# Patient Record
Sex: FEMALE | Race: OTHER | NOT HISPANIC OR LATINO | Employment: FULL TIME | URBAN - METROPOLITAN AREA
[De-identification: names, ages, dates, MRNs, and addresses within clinical notes are randomized per-mention and may not be internally consistent; named-entity substitution may affect disease eponyms.]

---

## 2020-06-04 ENCOUNTER — TELEMEDICINE (OUTPATIENT)
Dept: DERMATOLOGY | Facility: CLINIC | Age: 60
End: 2020-06-04
Payer: COMMERCIAL

## 2020-06-04 DIAGNOSIS — L68.0 HIRSUTISM: Primary | ICD-10-CM

## 2020-06-04 DIAGNOSIS — L02.91 ABSCESS: ICD-10-CM

## 2020-06-04 PROCEDURE — 99243 OFF/OP CNSLTJ NEW/EST LOW 30: CPT | Performed by: DERMATOLOGY

## 2020-06-04 RX ORDER — CEPHALEXIN 500 MG/1
500 CAPSULE ORAL 2 TIMES DAILY
Qty: 14 CAPSULE | Refills: 1 | Status: SHIPPED | OUTPATIENT
Start: 2020-06-04 | End: 2020-06-11

## 2020-06-04 RX ORDER — FINASTERIDE 1 MG/1
1 TABLET, FILM COATED ORAL DAILY
Qty: 90 TABLET | Refills: 2 | Status: SHIPPED | OUTPATIENT
Start: 2020-06-04 | End: 2021-02-19 | Stop reason: SDUPTHER

## 2020-06-04 RX ORDER — LISINOPRIL 10 MG/1
10 TABLET ORAL DAILY
COMMUNITY
End: 2021-01-21

## 2020-06-04 RX ORDER — SPIRONOLACTONE 25 MG/1
25 TABLET ORAL DAILY
Qty: 30 TABLET | Refills: 2 | Status: CANCELLED | OUTPATIENT
Start: 2020-06-04

## 2020-06-04 RX ORDER — MULTIVIT-MIN/IRON FUM/FOLIC AC 7.5 MG-4
1 TABLET ORAL DAILY
COMMUNITY

## 2020-06-12 ENCOUNTER — TELEPHONE (OUTPATIENT)
Dept: DERMATOLOGY | Facility: CLINIC | Age: 60
End: 2020-06-12

## 2020-06-12 NOTE — TELEPHONE ENCOUNTER
Please contact patient in regards of scheduling an appointment follow up with Dr Monika Alvarenga in [facility]  Patient wast last seen virtually on 6/04/2020 and was suppose to return in a week if condition did not get better

## 2020-06-15 ENCOUNTER — OFFICE VISIT (OUTPATIENT)
Dept: DERMATOLOGY | Facility: CLINIC | Age: 60
End: 2020-06-15
Payer: COMMERCIAL

## 2020-06-15 VITALS — WEIGHT: 178.5 LBS | BODY MASS INDEX: 25.56 KG/M2 | HEIGHT: 70 IN | TEMPERATURE: 96.8 F

## 2020-06-15 DIAGNOSIS — L98.0 PYOGENIC GRANULOMA: Primary | ICD-10-CM

## 2020-06-15 PROCEDURE — 11102 TANGNTL BX SKIN SINGLE LES: CPT | Performed by: DERMATOLOGY

## 2020-06-15 PROCEDURE — 88305 TISSUE EXAM BY PATHOLOGIST: CPT | Performed by: STUDENT IN AN ORGANIZED HEALTH CARE EDUCATION/TRAINING PROGRAM

## 2020-06-15 PROCEDURE — 99213 OFFICE O/P EST LOW 20 MIN: CPT | Performed by: DERMATOLOGY

## 2020-06-22 ENCOUNTER — TELEPHONE (OUTPATIENT)
Dept: DERMATOLOGY | Facility: CLINIC | Age: 60
End: 2020-06-22

## 2020-06-22 DIAGNOSIS — L68.0 HIRSUTISM: ICD-10-CM

## 2020-06-24 DIAGNOSIS — L29.9 PRURITUS OF SCALP: Primary | ICD-10-CM

## 2020-06-24 RX ORDER — KETOCONAZOLE 20 MG/ML
1 SHAMPOO TOPICAL 2 TIMES WEEKLY
Qty: 120 ML | Refills: 3 | Status: SHIPPED | OUTPATIENT
Start: 2020-06-25 | End: 2021-01-21

## 2020-11-07 ENCOUNTER — LAB (OUTPATIENT)
Dept: LAB | Age: 60
End: 2020-11-07

## 2020-11-07 ENCOUNTER — TRANSCRIBE ORDERS (OUTPATIENT)
Dept: ADMINISTRATIVE | Age: 60
End: 2020-11-07

## 2020-11-07 DIAGNOSIS — Z02.1 PRE-EMPLOYMENT HEALTH SCREENING EXAMINATION: Primary | ICD-10-CM

## 2020-11-07 DIAGNOSIS — Z02.1 PRE-EMPLOYMENT HEALTH SCREENING EXAMINATION: ICD-10-CM

## 2020-11-07 PROCEDURE — 86765 RUBEOLA ANTIBODY: CPT

## 2020-11-07 PROCEDURE — 86787 VARICELLA-ZOSTER ANTIBODY: CPT

## 2020-11-07 PROCEDURE — 86762 RUBELLA ANTIBODY: CPT

## 2020-11-07 PROCEDURE — 86735 MUMPS ANTIBODY: CPT

## 2020-11-07 PROCEDURE — 86480 TB TEST CELL IMMUN MEASURE: CPT

## 2020-11-08 LAB — RUBV IGG SERPL IA-ACNC: 25.9 IU/ML

## 2020-11-09 LAB
GAMMA INTERFERON BACKGROUND BLD IA-ACNC: 0.03 IU/ML
M TB IFN-G BLD-IMP: NEGATIVE
M TB IFN-G CD4+ BCKGRND COR BLD-ACNC: 0 IU/ML
M TB IFN-G CD4+ BCKGRND COR BLD-ACNC: 0.01 IU/ML
MITOGEN IGNF BCKGRD COR BLD-ACNC: >10 IU/ML

## 2020-11-10 LAB
MEV IGG SER QL: NORMAL
MUV IGG SER QL: NORMAL
VZV IGG SER IA-ACNC: NORMAL

## 2020-12-01 ENCOUNTER — NURSE TRIAGE (OUTPATIENT)
Dept: OTHER | Facility: OTHER | Age: 60
End: 2020-12-01

## 2020-12-01 ENCOUNTER — TELEMEDICINE (OUTPATIENT)
Dept: INTERNAL MEDICINE CLINIC | Facility: CLINIC | Age: 60
End: 2020-12-01
Payer: OTHER GOVERNMENT

## 2020-12-01 VITALS — HEIGHT: 70 IN | WEIGHT: 180 LBS | BODY MASS INDEX: 25.77 KG/M2

## 2020-12-01 DIAGNOSIS — Z12.11 SCREENING FOR MALIGNANT NEOPLASM OF COLON: Primary | ICD-10-CM

## 2020-12-01 DIAGNOSIS — Z20.822 EXPOSURE TO COVID-19 VIRUS: ICD-10-CM

## 2020-12-01 DIAGNOSIS — Z12.31 ENCOUNTER FOR SCREENING MAMMOGRAM FOR MALIGNANT NEOPLASM OF BREAST: ICD-10-CM

## 2020-12-01 DIAGNOSIS — B34.9 VIRAL INFECTION, UNSPECIFIED: ICD-10-CM

## 2020-12-01 PROCEDURE — 99213 OFFICE O/P EST LOW 20 MIN: CPT | Performed by: NURSE PRACTITIONER

## 2020-12-01 PROCEDURE — U0003 INFECTIOUS AGENT DETECTION BY NUCLEIC ACID (DNA OR RNA); SEVERE ACUTE RESPIRATORY SYNDROME CORONAVIRUS 2 (SARS-COV-2) (CORONAVIRUS DISEASE [COVID-19]), AMPLIFIED PROBE TECHNIQUE, MAKING USE OF HIGH THROUGHPUT TECHNOLOGIES AS DESCRIBED BY CMS-2020-01-R: HCPCS | Performed by: NURSE PRACTITIONER

## 2020-12-02 LAB — SARS-COV-2 RNA SPEC QL NAA+PROBE: NOT DETECTED

## 2020-12-30 ENCOUNTER — TELEPHONE (OUTPATIENT)
Dept: GASTROENTEROLOGY | Facility: CLINIC | Age: 60
End: 2020-12-30

## 2021-01-02 ENCOUNTER — IMMUNIZATIONS (OUTPATIENT)
Dept: FAMILY MEDICINE CLINIC | Facility: HOSPITAL | Age: 61
End: 2021-01-02

## 2021-01-02 DIAGNOSIS — Z23 ENCOUNTER FOR IMMUNIZATION: ICD-10-CM

## 2021-01-02 PROCEDURE — 0011A SARS-COV-2 / COVID-19 MRNA VACCINE (MODERNA) 100 MCG: CPT

## 2021-01-02 PROCEDURE — 91301 SARS-COV-2 / COVID-19 MRNA VACCINE (MODERNA) 100 MCG: CPT

## 2021-01-21 ENCOUNTER — OFFICE VISIT (OUTPATIENT)
Dept: INTERNAL MEDICINE CLINIC | Age: 61
End: 2021-01-21
Payer: COMMERCIAL

## 2021-01-21 VITALS
TEMPERATURE: 97.9 F | HEART RATE: 61 BPM | SYSTOLIC BLOOD PRESSURE: 122 MMHG | OXYGEN SATURATION: 97 % | WEIGHT: 191 LBS | DIASTOLIC BLOOD PRESSURE: 70 MMHG | BODY MASS INDEX: 29.98 KG/M2 | HEIGHT: 67 IN

## 2021-01-21 DIAGNOSIS — R73.01 IMPAIRED FASTING GLUCOSE: ICD-10-CM

## 2021-01-21 DIAGNOSIS — F51.01 PRIMARY INSOMNIA: ICD-10-CM

## 2021-01-21 DIAGNOSIS — Z12.11 SCREENING FOR MALIGNANT NEOPLASM OF COLON: ICD-10-CM

## 2021-01-21 DIAGNOSIS — Z11.59 NEED FOR HEPATITIS C SCREENING TEST: ICD-10-CM

## 2021-01-21 DIAGNOSIS — I10 BENIGN ESSENTIAL HYPERTENSION: Primary | ICD-10-CM

## 2021-01-21 DIAGNOSIS — Z12.39 ENCOUNTER FOR SCREENING FOR MALIGNANT NEOPLASM OF BREAST, UNSPECIFIED SCREENING MODALITY: ICD-10-CM

## 2021-01-21 DIAGNOSIS — L29.9 PRURITUS OF SCALP: ICD-10-CM

## 2021-01-21 DIAGNOSIS — F42.2 MIXED OBSESSIONAL THOUGHTS AND ACTS: ICD-10-CM

## 2021-01-21 DIAGNOSIS — Z13.228 SCREENING FOR METABOLIC DISORDER: ICD-10-CM

## 2021-01-21 PROCEDURE — 99214 OFFICE O/P EST MOD 30 MIN: CPT | Performed by: PHYSICIAN ASSISTANT

## 2021-01-21 RX ORDER — TRAZODONE HYDROCHLORIDE 50 MG/1
50 TABLET ORAL
COMMUNITY
End: 2021-01-21 | Stop reason: SDUPTHER

## 2021-01-21 RX ORDER — TRAZODONE HYDROCHLORIDE 50 MG/1
50 TABLET ORAL
Qty: 30 TABLET | Refills: 0 | Status: SHIPPED | OUTPATIENT
Start: 2021-01-21 | End: 2021-02-08 | Stop reason: SDUPTHER

## 2021-01-21 RX ORDER — KETOCONAZOLE 20 MG/ML
1 SHAMPOO TOPICAL 2 TIMES WEEKLY
Qty: 120 ML | Refills: 3 | Status: SHIPPED | OUTPATIENT
Start: 2021-01-21 | End: 2021-06-02 | Stop reason: SDUPTHER

## 2021-01-21 NOTE — PROGRESS NOTES
Assessment/Plan:         Diagnoses and all orders for this visit:    Benign essential hypertension  Comments:  pt stopped her lisinopril approx 2 months ago  Orders:  -     CBC and differential; Future  -     Comprehensive metabolic panel; Future  -     Lipid panel; Future  -     HEMOGLOBIN A1C W/ EAG ESTIMATION; Future    Need for hepatitis C screening test  -     Hepatitis C antibody; Future    Encounter for screening for malignant neoplasm of breast, unspecified screening modality  -     Mammo screening bilateral w 3d & cad; Future    Screening for malignant neoplasm of colon  -     Cologuard; Future    Mixed obsessional thoughts and acts  Comments:  with cleaning mostly, pt has difficulty with falling asleep at night     Primary insomnia  -     traZODone (DESYREL) 50 mg tablet; Take 1 tablet (50 mg total) by mouth daily at bedtime  -     CBC and differential; Future  -     Comprehensive metabolic panel; Future  -     Lipid panel; Future  -     HEMOGLOBIN A1C W/ EAG ESTIMATION; Future    Impaired fasting glucose  Comments:  mild elevation hga1c 5 8 (was 5 6)   +family history of dm (father)  Orders:  -     CBC and differential; Future  -     Comprehensive metabolic panel; Future  -     Lipid panel; Future  -     HEMOGLOBIN A1C W/ EAG ESTIMATION; Future    Screening for metabolic disorder  -     Lipid panel; Future    Pruritus of scalp  Comments:  may use ketoconazole prn   Orders:  -     ketoconazole (NIZORAL) 2 % shampoo; Apply 1 application topically 2 (two) times a week    Other orders  -     Discontinue: traZODone (DESYREL) 50 mg tablet; Take 50 mg by mouth daily at bedtime        BMI Counseling: Body mass index is 29 54 kg/m²  The BMI is above normal  Nutrition recommendations include encouraging healthy choices of fruits and vegetables, limiting drinks that contain sugar, moderation in carbohydrate intake and increasing intake of lean protein   Exercise recommendations include exercising 3-5 times per week            Subjective:      Patient ID: Cassius Delgado is a 64 y o  female  63 y/o female presents to establish care, hx of htn, impaired FBS  Previous pcp Dr Jimy Brand - [facility]  Pt is new employee [facility]  Pt reports she was on lisinopril in past but stopped taking this on her own approx 2 months ago  bp well controlled in office today    Pt reports hx of insomnia, related to anxiety and grief associated with loss of her son 2 years ago  She has been taking trazodone qhs for this with good results    Hx of impaired fbs   fam hx of dm  Pt continues to keep active, walks daily      The following portions of the patient's history were reviewed and updated as appropriate: allergies, current medications, past family history, past medical history, past social history, past surgical history and problem list     Review of Systems   Constitutional: Negative for appetite change, chills, fatigue and fever  HENT: Negative for congestion, sinus pain and sore throat  Eyes: Negative for photophobia and redness  Respiratory: Negative for cough, shortness of breath and wheezing  Cardiovascular: Negative for chest pain and leg swelling  Gastrointestinal: Negative for abdominal pain, constipation, diarrhea and nausea  Genitourinary: Negative for dysuria and frequency  Musculoskeletal: Negative for arthralgias and back pain  Skin: Negative for rash  Neurological: Negative for dizziness and headaches  Psychiatric/Behavioral: Negative for sleep disturbance  The patient is not nervous/anxious            Past Medical History:   Diagnosis Date    Hypertension          Current Outpatient Medications:     Multiple Vitamins-Minerals (MULTIVITAMIN WITH MINERALS) tablet, Take 1 tablet by mouth daily, Disp: , Rfl:     finasteride (PROPECIA) 1 MG tablet, Take 1 tablet (1 mg total) by mouth daily (Patient not taking: Reported on 1/21/2021), Disp: 90 tablet, Rfl: 2    ketoconazole (NIZORAL) 2 % shampoo, Apply 1 application topically 2 (two) times a week, Disp: 120 mL, Rfl: 3    traZODone (DESYREL) 50 mg tablet, Take 1 tablet (50 mg total) by mouth daily at bedtime, Disp: 30 tablet, Rfl: 0    Allergies   Allergen Reactions    Other Itching and Rash     Dust, bugs        Social History   Past Surgical History:   Procedure Laterality Date    TUBAL LIGATION       Family History   Problem Relation Age of Onset    No Known Problems Mother     Diabetes Father     Glaucoma Father        Objective:  /70 (BP Location: Left arm, Patient Position: Sitting, Cuff Size: Standard)   Pulse 61   Temp 97 9 °F (36 6 °C) (Temporal)   Ht 5' 7 42" (1 712 m) Comment: shoes off  Wt 86 6 kg (191 lb) Comment: shoes off  SpO2 97%   BMI 29 54 kg/m²        Physical Exam  Vitals signs reviewed  Constitutional:       General: She is not in acute distress  Eyes:      Extraocular Movements: Extraocular movements intact  Conjunctiva/sclera: Conjunctivae normal       Pupils: Pupils are equal, round, and reactive to light  Neck:      Musculoskeletal: Normal range of motion  Cardiovascular:      Rate and Rhythm: Normal rate and regular rhythm  Abdominal:      General: Bowel sounds are normal  There is no distension  Musculoskeletal: Normal range of motion  Right lower leg: No edema  Left lower leg: No edema  Lymphadenopathy:      Cervical: No cervical adenopathy  Skin:     Findings: No rash  Neurological:      General: No focal deficit present  Mental Status: She is alert and oriented to person, place, and time     Psychiatric:         Mood and Affect: Mood normal          Behavior: Behavior normal

## 2021-01-22 ENCOUNTER — TELEPHONE (OUTPATIENT)
Dept: ADMINISTRATIVE | Facility: OTHER | Age: 61
End: 2021-01-22

## 2021-01-22 NOTE — TELEPHONE ENCOUNTER
----- Message from Luiz Sutton MA sent at 1/22/2021 12:29 PM EST -----  Regarding: Pap  01/22/21 12:29 PM    Hello, our patient Sree Aguila has had Pap Smear (HPV) aka Cervical Cancer Screening completed/performed  Please assist in updating the patient chart by pulling the Care Everywhere (CE) document  The date of service is 08/01/2017       Thank you,  Luiz Sutton MA  [facility]

## 2021-01-22 NOTE — TELEPHONE ENCOUNTER
Upon review of the In Basket request we were able to locate, review, and update the patient chart as requested for Pap Smear (HPV) aka Cervical Cancer Screening  Any additional questions or concerns should be emailed to the Practice Liaisons via [email or web address] email, please do not reply via In Basket      Thank you  Andrew Ding MA

## 2021-01-25 ENCOUNTER — TELEMEDICINE (OUTPATIENT)
Dept: INTERNAL MEDICINE CLINIC | Facility: CLINIC | Age: 61
End: 2021-01-25
Payer: COMMERCIAL

## 2021-01-25 ENCOUNTER — TELEPHONE (OUTPATIENT)
Dept: INTERNAL MEDICINE CLINIC | Facility: CLINIC | Age: 61
End: 2021-01-25

## 2021-01-25 DIAGNOSIS — K52.9 GASTROENTERITIS: ICD-10-CM

## 2021-01-25 DIAGNOSIS — K52.9 GASTROENTERITIS: Primary | ICD-10-CM

## 2021-01-25 PROCEDURE — U0005 INFEC AGEN DETEC AMPLI PROBE: HCPCS | Performed by: INTERNAL MEDICINE

## 2021-01-25 PROCEDURE — 99213 OFFICE O/P EST LOW 20 MIN: CPT | Performed by: INTERNAL MEDICINE

## 2021-01-25 PROCEDURE — U0003 INFECTIOUS AGENT DETECTION BY NUCLEIC ACID (DNA OR RNA); SEVERE ACUTE RESPIRATORY SYNDROME CORONAVIRUS 2 (SARS-COV-2) (CORONAVIRUS DISEASE [COVID-19]), AMPLIFIED PROBE TECHNIQUE, MAKING USE OF HIGH THROUGHPUT TECHNOLOGIES AS DESCRIBED BY CMS-2020-01-R: HCPCS | Performed by: INTERNAL MEDICINE

## 2021-01-25 NOTE — TELEPHONE ENCOUNTER
Patient works for [employer] needs as covid script put in her chart  Alisa Bland will not her back to work until she gets tested

## 2021-01-25 NOTE — LETTER
[address] [place] [address]  [place] [id number]  Phone#  [phone]  Fax#  [phone]      January 25, 2021     Patient: Nikki Carvajal  YOB: 1960  Date of Last Encounter: 1/25/2021    To whom it may concern:    Nikki Carvajal is being sent for COVID testing  She may work remotely until her [place] test has returned as she is to remain in quarantine at this time       Sincerely,      Jasen Dickinson MD

## 2021-01-25 NOTE — PROGRESS NOTES
Virtual Regular Visit      Assessment/Plan:    Problem List Items Addressed This Visit        Digestive    Gastroenteritis - Primary     Discussed adhering to a clear liquid diet and advancing as tolerated  Emphasized the importance of adequate oral hydration  Her employer is requesting she be tested for [place] prior to returning to work  Will send for COVID testing  In the meantime, advised that she remaining quarantine until test has been resulted  Relevant Orders    Novel Coronavirus (Covid-19),PCR [facility] or [facility]               Reason for visit is   Chief Complaint   Patient presents with    Diarrhea     Patient is here c/o vomiting, diarrhea and vomiting  Sx are going on since last night  Pt was unable to take vitals from home   Virtual Regular Visit        Encounter provider Jasen Dickinson MD    Provider located at [address] [place] [id number]      Recent Visits  No visits were found meeting these conditions  Showing recent visits within past 7 days and meeting all other requirements     Today's Visits  Date Type Provider Dept   01/25/21 Telemedicine Jasen Dickinson MD [facility]   01/25/21 Telephone Darius Dudley DO [facility]   Showing today's visits and meeting all other requirements     Future Appointments  No visits were found meeting these conditions  Showing future appointments within next 150 days and meeting all other requirements        The patient was identified by name and date of birth  Nikki Carvajal was informed that this is a telemedicine visit and that the visit is being conducted through [facility] and patient was informed that this is a secure, HIPAA-compliant platform  She agrees to proceed     My office door was closed  No one else was in the room    She acknowledged consent and understanding of privacy and security of the video platform  The patient has agreed to participate and understands they can discontinue the visit at any time  Patient is aware this is a billable service  Marycarmen Gilmore is a  64year old female is seen today with symptoms of gastroenteritis  She had seafood for dinner last night and subsequently developed nausea, nonbloody/nonbilious emesis, and diarrhea  She has not had any food to eat today  Diarrhea   This is a new problem  The current episode started yesterday  The problem has been unchanged  The stool consistency is described as watery  Associated symptoms include vomiting  Pertinent negatives include no abdominal pain, chills, coughing, fever or headaches  Risk factors include suspect food intake  She has tried increased fluids for the symptoms  The treatment provided no relief  Past Medical History:   Diagnosis Date    Hypertension        Past Surgical History:   Procedure Laterality Date    TUBAL LIGATION         Current Outpatient Medications   Medication Sig Dispense Refill    ketoconazole (NIZORAL) 2 % shampoo Apply 1 application topically 2 (two) times a week 120 mL 3    Multiple Vitamins-Minerals (MULTIVITAMIN WITH MINERALS) tablet Take 1 tablet by mouth daily      traZODone (DESYREL) 50 mg tablet Take 1 tablet (50 mg total) by mouth daily at bedtime 30 tablet 0    finasteride (PROPECIA) 1 MG tablet Take 1 tablet (1 mg total) by mouth daily (Patient not taking: Reported on 1/21/2021) 90 tablet 2     No current facility-administered medications for this visit  Allergies   Allergen Reactions    Other Itching and Rash     Dust, bugs        Review of Systems   Constitutional: Negative for activity change, appetite change, chills, diaphoresis, fatigue and fever  HENT: Negative for congestion, postnasal drip, rhinorrhea, sinus pressure, sinus pain, sneezing and sore throat      Eyes: Negative for visual disturbance  Respiratory: Negative for apnea, cough, choking, chest tightness, shortness of breath and wheezing  Cardiovascular: Negative for chest pain, palpitations and leg swelling  Gastrointestinal: Positive for diarrhea and vomiting  Negative for abdominal distention, abdominal pain, anal bleeding, blood in stool, constipation and nausea  Endocrine: Negative for cold intolerance and heat intolerance  Genitourinary: Negative for difficulty urinating, dysuria and hematuria  Musculoskeletal: Negative  Skin: Negative  Neurological: Negative for dizziness, weakness, light-headedness, numbness and headaches  Hematological: Negative for adenopathy  Psychiatric/Behavioral: Negative for agitation, sleep disturbance and suicidal ideas  All other systems reviewed and are negative  Video Exam    There were no vitals filed for this visit  Physical Exam  Vitals signs and nursing note reviewed  Constitutional:       General: She is not in acute distress  Appearance: She is well-developed  She is not diaphoretic  HENT:      Head: Normocephalic and atraumatic  Eyes:      General:         Right eye: No discharge  Left eye: No discharge  Conjunctiva/sclera: Conjunctivae normal    Neck:      Musculoskeletal: Normal range of motion  Pulmonary:      Effort: Pulmonary effort is normal  No respiratory distress  Abdominal:      General: There is no distension  Tenderness: There is no abdominal tenderness  Musculoskeletal:         General: No tenderness or deformity  Skin:     Coloration: Skin is not pale  Findings: No erythema or rash  Neurological:      Mental Status: She is alert and oriented to person, place, and time  Cranial Nerves: No cranial nerve deficit  Psychiatric:         Behavior: Behavior normal          Thought Content:  Thought content normal          Judgment: Judgment normal           I spent 15 minutes directly with the patient during this visit      VIRTUAL VISIT DISCLAIMER    Ibis Dillard acknowledges that she has consented to an online visit or consultation  She understands that the online visit is based solely on information provided by her, and that, in the absence of a face-to-face physical evaluation by the physician, the diagnosis she receives is both limited and provisional in terms of accuracy and completeness  This is not intended to replace a full medical face-to-face evaluation by the physician  Roseann Whaley understands and accepts these terms

## 2021-01-25 NOTE — ASSESSMENT & PLAN NOTE
Discussed adhering to a clear liquid diet and advancing as tolerated  Emphasized the importance of adequate oral hydration  Her employer is requesting she be tested for [place] prior to returning to work  Will send for COVID testing  In the meantime, advised that she remaining quarantine until test has been resulted

## 2021-01-26 ENCOUNTER — TELEPHONE (OUTPATIENT)
Dept: INTERNAL MEDICINE CLINIC | Age: 61
End: 2021-01-26

## 2021-01-26 LAB — SARS-COV-2 RNA RESP QL NAA+PROBE: NEGATIVE

## 2021-01-26 NOTE — TELEPHONE ENCOUNTER
----- Message from Jorge Chasenicheko Mariscal sent at 1/26/2021  4:21 PM EST -----  Please call Ibis and let her know that her COVID-19 swab was negative  Please advise her to continue social distancing procedures

## 2021-01-26 NOTE — RESULT ENCOUNTER NOTE
Please call Ibis and let her know that her COVID-19 swab was negative  Please advise her to continue social distancing procedures

## 2021-01-30 ENCOUNTER — IMMUNIZATIONS (OUTPATIENT)
Dept: FAMILY MEDICINE CLINIC | Facility: HOSPITAL | Age: 61
End: 2021-01-30

## 2021-01-30 DIAGNOSIS — Z23 ENCOUNTER FOR IMMUNIZATION: Primary | ICD-10-CM

## 2021-01-30 PROCEDURE — 0012A SARS-COV-2 / COVID-19 MRNA VACCINE (MODERNA) 100 MCG: CPT

## 2021-01-30 PROCEDURE — 91301 SARS-COV-2 / COVID-19 MRNA VACCINE (MODERNA) 100 MCG: CPT

## 2021-02-08 DIAGNOSIS — F51.01 PRIMARY INSOMNIA: ICD-10-CM

## 2021-02-08 RX ORDER — TRAZODONE HYDROCHLORIDE 50 MG/1
50 TABLET ORAL
Qty: 30 TABLET | Refills: 0 | Status: SHIPPED | OUTPATIENT
Start: 2021-02-08 | End: 2021-03-02 | Stop reason: SDUPTHER

## 2021-02-08 NOTE — TELEPHONE ENCOUNTER
the patient reports has been taking 1-2 tabs per night, reports that how she was taking it in the past     Has no medication left

## 2021-02-08 NOTE — TELEPHONE ENCOUNTER
Patient called to Refill her Prescription for Trazodone 50mg  Please f/u with Patient for her Medication   Thank You

## 2021-02-19 ENCOUNTER — TELEPHONE (OUTPATIENT)
Dept: DERMATOLOGY | Facility: CLINIC | Age: 61
End: 2021-02-19

## 2021-02-19 DIAGNOSIS — L68.0 HIRSUTISM: ICD-10-CM

## 2021-02-19 RX ORDER — FINASTERIDE 1 MG/1
1 TABLET, FILM COATED ORAL DAILY
Qty: 90 TABLET | Refills: 2 | Status: SHIPPED | OUTPATIENT
Start: 2021-02-19 | End: 2021-06-02

## 2021-02-28 DIAGNOSIS — F51.01 PRIMARY INSOMNIA: ICD-10-CM

## 2021-03-01 DIAGNOSIS — F51.01 PRIMARY INSOMNIA: ICD-10-CM

## 2021-03-01 RX ORDER — TRAZODONE HYDROCHLORIDE 50 MG/1
TABLET ORAL
Qty: 30 TABLET | Refills: 0 | OUTPATIENT
Start: 2021-03-01

## 2021-03-02 ENCOUNTER — TELEPHONE (OUTPATIENT)
Dept: INTERNAL MEDICINE CLINIC | Age: 61
End: 2021-03-02

## 2021-03-02 DIAGNOSIS — L68.0 HIRSUTISM: Primary | ICD-10-CM

## 2021-03-02 DIAGNOSIS — F51.01 PRIMARY INSOMNIA: ICD-10-CM

## 2021-03-02 RX ORDER — TRAZODONE HYDROCHLORIDE 50 MG/1
50 TABLET ORAL
Qty: 30 TABLET | Refills: 0 | Status: SHIPPED | OUTPATIENT
Start: 2021-03-02 | End: 2021-03-24 | Stop reason: SDUPTHER

## 2021-03-02 RX ORDER — FINASTERIDE 5 MG/1
TABLET, FILM COATED ORAL
Qty: 10 TABLET | Refills: 5 | Status: SHIPPED | OUTPATIENT
Start: 2021-03-02 | End: 2021-06-02

## 2021-03-03 RX ORDER — TRAZODONE HYDROCHLORIDE 50 MG/1
TABLET ORAL
Qty: 30 TABLET | Refills: 0 | OUTPATIENT
Start: 2021-03-03

## 2021-03-22 ENCOUNTER — TELEPHONE (OUTPATIENT)
Dept: INTERNAL MEDICINE CLINIC | Age: 61
End: 2021-03-22

## 2021-03-22 DIAGNOSIS — F51.01 PRIMARY INSOMNIA: ICD-10-CM

## 2021-03-22 RX ORDER — TRAZODONE HYDROCHLORIDE 50 MG/1
TABLET ORAL
Qty: 30 TABLET | Refills: 0 | OUTPATIENT
Start: 2021-03-22

## 2021-03-22 NOTE — TELEPHONE ENCOUNTER
This patient called the office and needs a refill of her Trazodone  The patient states that she discussed with Jenelle Healy that she takes 2 pills at bedtime because 1 pill does not work  Can we please look into this and send over a refill for this patient  Please call the patient and let her know what the outcome is

## 2021-03-22 NOTE — TELEPHONE ENCOUNTER
Most recent office visit with Bharti doesn't note that she has been taking 2 tabs at bed time   Will need to follow up with Bharti regarding this request

## 2021-03-23 ENCOUNTER — TELEPHONE (OUTPATIENT)
Dept: OTHER | Facility: OTHER | Age: 61
End: 2021-03-23

## 2021-03-23 DIAGNOSIS — F51.01 PRIMARY INSOMNIA: ICD-10-CM

## 2021-03-24 DIAGNOSIS — F51.01 PRIMARY INSOMNIA: ICD-10-CM

## 2021-03-24 RX ORDER — TRAZODONE HYDROCHLORIDE 50 MG/1
50 TABLET ORAL
Qty: 30 TABLET | Refills: 1 | Status: SHIPPED | OUTPATIENT
Start: 2021-03-24 | End: 2021-05-21 | Stop reason: SDUPTHER

## 2021-03-24 RX ORDER — TRAZODONE HYDROCHLORIDE 50 MG/1
50 TABLET ORAL
Qty: 30 TABLET | Refills: 5 | Status: CANCELLED | OUTPATIENT
Start: 2021-03-24

## 2021-03-24 NOTE — TELEPHONE ENCOUNTER
I don't see anything about change in dose in note from January, I see she requested refill yesterday, will send in now

## 2021-04-02 ENCOUNTER — TELEPHONE (OUTPATIENT)
Dept: INTERNAL MEDICINE CLINIC | Facility: CLINIC | Age: 61
End: 2021-04-02

## 2021-04-21 DIAGNOSIS — F51.01 PRIMARY INSOMNIA: ICD-10-CM

## 2021-04-21 RX ORDER — TRAZODONE HYDROCHLORIDE 50 MG/1
50 TABLET ORAL
Qty: 30 TABLET | Refills: 1 | OUTPATIENT
Start: 2021-04-21

## 2021-04-21 NOTE — TELEPHONE ENCOUNTER
Patient called the office for a refill of her Trazodone 50mg and the pharmacy is [facility] on [address]

## 2021-05-12 DIAGNOSIS — F51.01 PRIMARY INSOMNIA: ICD-10-CM

## 2021-05-12 RX ORDER — TRAZODONE HYDROCHLORIDE 50 MG/1
TABLET ORAL
Qty: 30 TABLET | Refills: 1 | OUTPATIENT
Start: 2021-05-12

## 2021-05-21 DIAGNOSIS — F51.01 PRIMARY INSOMNIA: ICD-10-CM

## 2021-05-21 RX ORDER — TRAZODONE HYDROCHLORIDE 50 MG/1
50 TABLET ORAL
Qty: 30 TABLET | Refills: 1 | Status: SHIPPED | OUTPATIENT
Start: 2021-05-21 | End: 2021-07-13 | Stop reason: SDUPTHER

## 2021-06-02 ENCOUNTER — OFFICE VISIT (OUTPATIENT)
Dept: INTERNAL MEDICINE CLINIC | Age: 61
End: 2021-06-02
Payer: COMMERCIAL

## 2021-06-02 VITALS
SYSTOLIC BLOOD PRESSURE: 132 MMHG | HEART RATE: 77 BPM | HEIGHT: 67 IN | BODY MASS INDEX: 29.66 KG/M2 | WEIGHT: 189 LBS | TEMPERATURE: 97.1 F | OXYGEN SATURATION: 97 % | DIASTOLIC BLOOD PRESSURE: 68 MMHG

## 2021-06-02 DIAGNOSIS — L55.1 SUNBURN, SECOND DEGREE: Primary | ICD-10-CM

## 2021-06-02 DIAGNOSIS — L29.9 PRURITUS OF SCALP: ICD-10-CM

## 2021-06-02 DIAGNOSIS — L30.9 DERMATITIS: ICD-10-CM

## 2021-06-02 PROCEDURE — 99213 OFFICE O/P EST LOW 20 MIN: CPT | Performed by: PHYSICIAN ASSISTANT

## 2021-06-02 RX ORDER — KETOCONAZOLE 20 MG/ML
1 SHAMPOO TOPICAL 2 TIMES WEEKLY
Qty: 120 ML | Refills: 3 | Status: SHIPPED | OUTPATIENT
Start: 2021-06-03 | End: 2021-10-21 | Stop reason: SDUPTHER

## 2021-06-02 RX ORDER — PREDNISONE 10 MG/1
TABLET ORAL
Qty: 20 TABLET | Refills: 0 | Status: SHIPPED | OUTPATIENT
Start: 2021-06-02 | End: 2021-08-26

## 2021-06-02 NOTE — PROGRESS NOTES
Assessment/Plan:         Diagnoses and all orders for this visit:    Sunburn, second degree  Comments:  keep skin moisturized   may use aloe  keep hydrated  pred taper in am with food  benadyl at bedtime x 3-5 days  Orders:  -     predniSONE 10 mg tablet; 4 tabs daily for 2 days then 3 tabs daily x 2 days then 2 tabs daily x 2 days then 1 tablet daily for 2 days  Dermatitis  Comments:  f/u in 1-2 weeks if any further bites or rash persist   Orders:  -     predniSONE 10 mg tablet; 4 tabs daily for 2 days then 3 tabs daily x 2 days then 2 tabs daily x 2 days then 1 tablet daily for 2 days  Pruritus of scalp  Comments:  may use ketoconazole prn   Orders:  -     ketoconazole (NIZORAL) 2 % shampoo; Apply 1 application topically 2 (two) times a week      discussed need to wear sunscreen on beach and when sunbathing to prevent blistering of skin and sunburn   -discussed prednisone, may cause trouble sleeping tonight  Pt also to take in am /lunchtime daily and with food     Subjective:      Patient ID: Grant May is a 64 y o  female  63 y/o female who was on vacation in [place] over the weekend, she states she was laying on the beach and fell asleep under a palm tree and woke up with red skin with "bumps"  She did not apply any sun tan lotion  Pt reports her skin has been swollen, hot and pain/itching since the weekend  She notes raised bumps on her LLE with excoriation noted  Pt also is concerned with some type of insect bite  She has tried aloe, tylenol         The following portions of the patient's history were reviewed and updated as appropriate: allergies, current medications, past family history, past medical history, past social history, past surgical history and problem list     Review of Systems   Constitutional: Negative for appetite change, chills, diaphoresis, fatigue and fever  HENT: Negative for congestion and sore throat  Eyes: Negative for pain and redness     Respiratory: Negative for cough, shortness of breath and wheezing  Cardiovascular: Positive for leg swelling  Negative for chest pain  Gastrointestinal: Negative for abdominal pain, diarrhea and nausea  Genitourinary: Negative for dysuria and flank pain  Musculoskeletal: Negative for arthralgias, back pain and gait problem  Skin: Positive for rash  Neurological: Negative for dizziness, light-headedness and headaches  Psychiatric/Behavioral: Negative for sleep disturbance  The patient is not nervous/anxious  Past Medical History:   Diagnosis Date    Hypertension          Current Outpatient Medications:     [START ON 6/3/2021] ketoconazole (NIZORAL) 2 % shampoo, Apply 1 application topically 2 (two) times a week, Disp: 120 mL, Rfl: 3    Multiple Vitamins-Minerals (MULTIVITAMIN WITH MINERALS) tablet, Take 1 tablet by mouth daily, Disp: , Rfl:     traZODone (DESYREL) 50 mg tablet, Take 1 tablet (50 mg total) by mouth daily at bedtime, Disp: 30 tablet, Rfl: 1    predniSONE 10 mg tablet, 4 tabs daily for 2 days then 3 tabs daily x 2 days then 2 tabs daily x 2 days then 1 tablet daily for 2 days  , Disp: 20 tablet, Rfl: 0    Allergies   Allergen Reactions    Other Itching and Rash     Dust, bugs        Social History   Past Surgical History:   Procedure Laterality Date    TUBAL LIGATION       Family History   Problem Relation Age of Onset    No Known Problems Mother     Diabetes Father     Glaucoma Father        Objective:  /68 (BP Location: Left arm, Patient Position: Sitting, Cuff Size: Standard)   Pulse 77   Temp (!) 97 1 °F (36 2 °C) (Temporal)   Ht 5' 7 4" (1 712 m)   Wt 85 7 kg (189 lb)   SpO2 97%   BMI 29 25 kg/m²        Physical Exam  Vitals signs reviewed  Constitutional:       General: She is not in acute distress  HENT:      Head: Normocephalic and atraumatic        Right Ear: Tympanic membrane and ear canal normal       Left Ear: Tympanic membrane and ear canal normal    Eyes:      General: Right eye: No discharge  Left eye: No discharge  Extraocular Movements: Extraocular movements intact  Conjunctiva/sclera: Conjunctivae normal       Pupils: Pupils are equal, round, and reactive to light  Cardiovascular:      Rate and Rhythm: Normal rate and regular rhythm  Pulmonary:      Effort: Pulmonary effort is normal       Breath sounds: No wheezing or rales  Musculoskeletal: Normal range of motion  Right lower leg: Edema present  Left lower leg: Edema (mild LE edema around area of burn ) present  Skin:     General: Skin is warm  Findings: Erythema (diffuse erythematous skin ) and rash present  Comments: Diffuse erythematous skin with mild edema over shins b/l  Small excoriations on LE b/l - raised blisters on L knee   No drainage or exudates    Peeling skin on chest and face    Neurological:      General: No focal deficit present  Mental Status: She is alert and oriented to person, place, and time     Psychiatric:         Mood and Affect: Mood normal          Behavior: Behavior normal

## 2021-06-05 ENCOUNTER — HOSPITAL ENCOUNTER (OUTPATIENT)
Dept: RADIOLOGY | Age: 61
Discharge: HOME | End: 2021-06-05
Payer: COMMERCIAL

## 2021-06-05 VITALS — BODY MASS INDEX: 27.99 KG/M2 | WEIGHT: 189 LBS | HEIGHT: 69 IN

## 2021-06-05 DIAGNOSIS — Z12.39 ENCOUNTER FOR SCREENING FOR MALIGNANT NEOPLASM OF BREAST, UNSPECIFIED SCREENING MODALITY: ICD-10-CM

## 2021-06-05 DIAGNOSIS — Z12.31 ENCOUNTER FOR SCREENING MAMMOGRAM FOR MALIGNANT NEOPLASM OF BREAST: ICD-10-CM

## 2021-06-05 PROCEDURE — 77067 SCR MAMMO BI INCL CAD: CPT

## 2021-06-05 PROCEDURE — 77063 BREAST TOMOSYNTHESIS BI: CPT

## 2021-06-07 ENCOUNTER — TELEMEDICINE (OUTPATIENT)
Dept: INTERNAL MEDICINE CLINIC | Facility: CLINIC | Age: 61
End: 2021-06-07
Payer: COMMERCIAL

## 2021-06-07 VITALS — BODY MASS INDEX: 27.99 KG/M2 | WEIGHT: 189 LBS | HEIGHT: 69 IN

## 2021-06-07 DIAGNOSIS — L55.1 SUNBURN OF SECOND DEGREE: Primary | ICD-10-CM

## 2021-06-07 DIAGNOSIS — R19.7 DIARRHEA, UNSPECIFIED TYPE: ICD-10-CM

## 2021-06-07 PROCEDURE — 99213 OFFICE O/P EST LOW 20 MIN: CPT | Performed by: PHYSICIAN ASSISTANT

## 2021-06-07 NOTE — LETTER
June 7, 2021     Patient: Rubi Lopez   YOB: 1960   Date of Visit: 6/7/2021       To Whom it May Concern:    Rubi Lopez is under my professional care  She was seen in my office on 6/7/2021  She may return to work on 6/9/21  If you have any questions or concerns, please don't hesitate to call           Sincerely,          Yanick Johns PA-C        CC: Rubi Lopez

## 2021-06-07 NOTE — PROGRESS NOTES
Virtual Regular Visit      Assessment/Plan:    Problem List Items Addressed This Visit     None      Visit Diagnoses     Sunburn of second degree    -  Primary    stop pred  keep drinking water and hydrating  use topical cocoa butter and may use milk bath     Diarrhea, unspecified type        d/c pred  brat diet, gatorade to replace fluids               Reason for visit is   Chief Complaint   Patient presents with    Virtual Regular Visit     [phone] Patient started with Diarrhea and frequency, from Prednisone that she was prescriped last week for sunburn    Virtual Regular Visit        Encounter provider Kendrick Collins PA-C    Provider located at [address] [place] [address] [address] [facility] [id number]      Recent Visits  Date Type Provider Dept   06/02/21 Office Visit Kendrick Collins,  [address] recent visits within past 7 days and meeting all other requirements     Today's Visits  Date Type Provider Dept   06/07/21 Telemedicine Kendrick Collins PA-C [facility]   Showing today's visits and meeting all other requirements     Future Appointments  No visits were found meeting these conditions  Showing future appointments within next 150 days and meeting all other requirements        The patient was identified by name and date of birth  Patricia Woodward was informed that this is a telemedicine visit and that the visit is being conducted through [address] [place] [address] Now and patient was informed that this is a secure, HIPAA-compliant platform  She agrees to proceed     My office door was closed  No one else was in the room  She acknowledged consent and understanding of privacy and security of the video platform  The patient has agreed to participate and understands they can discontinue the visit at any time  Patient is aware this is a billable service  Subjective  [facility] [place], [place] is a 64 y o  female   Pt f/u today for 2nd degree sunburn, pt was started on prednisone last week along with topical tx and now c/o diarrhea from prednisone and intense itching from sunburn, she was up all night and was unable to go to work today  She reports her skin is peeling and is intensely itchy  Pt notes the redness is resolving and blisters have resolved  Pt reports appetite is normal and she is hydrating today           Past Medical History:   Diagnosis Date    Hypertension        Past Surgical History:   Procedure Laterality Date    ABLATION COLPOCLESIS      TUBAL LIGATION         Current Outpatient Medications   Medication Sig Dispense Refill    ketoconazole (NIZORAL) 2 % shampoo Apply 1 application topically 2 (two) times a week 120 mL 3    Multiple Vitamins-Minerals (MULTIVITAMIN WITH MINERALS) tablet Take 1 tablet by mouth daily      predniSONE 10 mg tablet 4 tabs daily for 2 days then 3 tabs daily x 2 days then 2 tabs daily x 2 days then 1 tablet daily for 2 days  20 tablet 0    traZODone (DESYREL) 50 mg tablet Take 1 tablet (50 mg total) by mouth daily at bedtime 30 tablet 1     No current facility-administered medications for this visit  Allergies   Allergen Reactions    Other Itching and Rash     Dust, bugs        Review of Systems   Constitutional: Negative for diaphoresis, fatigue and fever  HENT: Negative for congestion and sore throat  Respiratory: Negative for cough and shortness of breath  Gastrointestinal: Negative for abdominal pain, constipation, diarrhea and nausea  Musculoskeletal: Negative for arthralgias, back pain and gait problem  Skin: Positive for rash  Itching of skin    Neurological: Negative for dizziness, light-headedness and headaches  Psychiatric/Behavioral: Positive for sleep disturbance  The patient is not nervous/anxious          Video Exam    Vitals:    06/07/21 1423   Weight: 85 7 kg (189 lb) Height: 5' 9" (1 753 m)       Physical Exam  Vitals signs reviewed  Constitutional:       General: She is not in acute distress  Skin:     Findings: No erythema or rash (no visible redness on face or UE, no visible blisters)  Neurological:      General: No focal deficit present  Mental Status: She is alert and oriented to person, place, and time  Psychiatric:         Mood and Affect: Mood normal           I spent 15 minutes with patient today in which greater than 50% of the time was spent in counseling/coordination of care regarding medication side effect      [address] acknowledges that she has consented to an online visit or consultation  She understands that the online visit is based solely on information provided by her, and that, in the absence of a face-to-face physical evaluation by the physician, the diagnosis she receives is both limited and provisional in terms of accuracy and completeness  This is not intended to replace a full medical face-to-face evaluation by the physician  Ashwini Espana understands and accepts these terms

## 2021-06-08 ENCOUNTER — TELEPHONE (OUTPATIENT)
Dept: INTERNAL MEDICINE CLINIC | Age: 61
End: 2021-06-08

## 2021-06-16 ENCOUNTER — TELEPHONE (OUTPATIENT)
Dept: INTERNAL MEDICINE CLINIC | Age: 61
End: 2021-06-16

## 2021-06-16 NOTE — TELEPHONE ENCOUNTER
Patient called the office and stated she needs a refill for:    Trazodone 50 mg tablet    Sent to [facility]

## 2021-07-13 DIAGNOSIS — F51.01 PRIMARY INSOMNIA: ICD-10-CM

## 2021-07-13 RX ORDER — TRAZODONE HYDROCHLORIDE 50 MG/1
50 TABLET ORAL
Qty: 30 TABLET | Refills: 1 | Status: SHIPPED | OUTPATIENT
Start: 2021-07-13 | End: 2021-09-23 | Stop reason: SDUPTHER

## 2021-07-13 NOTE — TELEPHONE ENCOUNTER
Pt went to [facility] to :    traZODone (DESYREL) 50 mg tablet           They told her she has to pick it up at the hospital  She wishes for you to send the prescription to [facility]  So that she may  today if possible       Thank you

## 2021-07-23 ENCOUNTER — OFFICE VISIT (OUTPATIENT)
Dept: INTERNAL MEDICINE CLINIC | Age: 61
End: 2021-07-23
Payer: COMMERCIAL

## 2021-07-23 VITALS
HEART RATE: 65 BPM | HEIGHT: 68 IN | DIASTOLIC BLOOD PRESSURE: 90 MMHG | OXYGEN SATURATION: 99 % | BODY MASS INDEX: 28.98 KG/M2 | TEMPERATURE: 97.9 F | SYSTOLIC BLOOD PRESSURE: 148 MMHG | WEIGHT: 191.2 LBS

## 2021-07-23 DIAGNOSIS — I10 BENIGN ESSENTIAL HYPERTENSION: Primary | ICD-10-CM

## 2021-07-23 DIAGNOSIS — M25.562 ACUTE PAIN OF LEFT KNEE: ICD-10-CM

## 2021-07-23 DIAGNOSIS — M70.52 BURSITIS OF LEFT KNEE, UNSPECIFIED BURSA: ICD-10-CM

## 2021-07-23 PROCEDURE — 99213 OFFICE O/P EST LOW 20 MIN: CPT | Performed by: INTERNAL MEDICINE

## 2021-07-23 NOTE — PROGRESS NOTES
Assessment/Plan:    1  Left knee pain the/bursitis/tendinitis  Will request x-ray left knee  Patient does have a fall in January 2021  No imaging since then  Patient already have appointment with the orthopedic group on July 29th 2021  Advised to keep that appointment  Will also prescribe diclofenac gel 1 percent apply topically 3 to 4 times a day  Two extra-strength Tylenol 2 to 3 times a day as needed  Avoid strenuous physical exercise for now  Diagnoses and all orders for this visit:    Benign essential hypertension    Acute pain of left knee  -     XR knee 4+ vw left injury; Future  -     Diclofenac Sodium (VOLTAREN) 1 %; Apply 2 g topically 4 (four) times a day    Bursitis of left knee, unspecified bursa               Subjective:          Patient ID: Junior Pang is a 64 y o  female  Patient came to office with the complain of left knee pain started about 3 days ago  Denied any recent trauma but in January this year she have a fall and at that time according to the patient pain resolve after its own  She is taking Tylenol without any significant improvement  No fever chills  The following portions of the patient's history were reviewed and updated as appropriate: allergies, current medications, past family history, past medical history, past social history, past surgical history and problem list     Review of Systems   Constitutional: Negative for fatigue and fever  HENT: Negative for congestion, ear discharge, ear pain, postnasal drip, sinus pressure, sore throat, tinnitus and trouble swallowing  Eyes: Negative for discharge, itching and visual disturbance  Respiratory: Negative for cough and shortness of breath  Cardiovascular: Negative for chest pain and palpitations  Gastrointestinal: Negative for abdominal pain, diarrhea, nausea and vomiting  Endocrine: Negative for cold intolerance and polyuria     Genitourinary: Negative for difficulty urinating, dysuria and urgency  Musculoskeletal: Positive for arthralgias  Negative for neck pain  Skin: Negative for rash  Allergic/Immunologic: Negative for environmental allergies  Neurological: Negative for dizziness, weakness and headaches  Psychiatric/Behavioral: The patient is not nervous/anxious  Past Medical History:   Diagnosis Date    Hypertension          Current Outpatient Medications:     ketoconazole (NIZORAL) 2 % shampoo, Apply 1 application topically 2 (two) times a week, Disp: 120 mL, Rfl: 3    Multiple Vitamins-Minerals (MULTIVITAMIN WITH MINERALS) tablet, Take 1 tablet by mouth daily, Disp: , Rfl:     traZODone (DESYREL) 50 mg tablet, Take 1 tablet (50 mg total) by mouth daily at bedtime, Disp: 30 tablet, Rfl: 1    Diclofenac Sodium (VOLTAREN) 1 %, Apply 2 g topically 4 (four) times a day, Disp: 100 g, Rfl: 1    predniSONE 10 mg tablet, 4 tabs daily for 2 days then 3 tabs daily x 2 days then 2 tabs daily x 2 days then 1 tablet daily for 2 days  , Disp: 20 tablet, Rfl: 0    Allergies   Allergen Reactions    Other Itching and Rash     Dust, bugs        Social History   Past Surgical History:   Procedure Laterality Date    ABLATION COLPOCLESIS      TUBAL LIGATION       Family History   Problem Relation Age of Onset    No Known Problems Mother     Diabetes Father     Glaucoma Father     No Known Problems Sister     No Known Problems Daughter     Breast cancer Maternal Grandmother         unknown age   Lenora Silence No Known Problems Maternal Grandfather     Breast cancer Paternal Grandmother         unknown age   Lenora Silence No Known Problems Paternal Grandfather     No Known Problems Son     No Known Problems Sister     No Known Problems Paternal Aunt        Objective:  /90 (BP Location: Left arm, Patient Position: Sitting, Cuff Size: Large)   Pulse 65   Temp 97 9 °F (36 6 °C) (Temporal)   Ht 5' 7 91" (1 725 m)   Wt 86 7 kg (191 lb 3 2 oz)   SpO2 99% Comment: Room Air  BMI 29 15 kg/m²   Body mass index is 29 15 kg/m²  Physical Exam  Constitutional:       Appearance: She is well-developed  HENT:      Head: Normocephalic  Right Ear: External ear normal       Left Ear: External ear normal       Nose: No rhinorrhea  Mouth/Throat:      Pharynx: No posterior oropharyngeal erythema  Eyes:      General: No scleral icterus  Pupils: Pupils are equal, round, and reactive to light  Neck:      Thyroid: No thyromegaly  Trachea: No tracheal deviation  Cardiovascular:      Rate and Rhythm: Normal rate and regular rhythm  Heart sounds: Normal heart sounds  No murmur heard  Pulmonary:      Effort: Pulmonary effort is normal  No respiratory distress  Breath sounds: Normal breath sounds  Chest:      Chest wall: No tenderness  Abdominal:      General: Bowel sounds are normal       Palpations: Abdomen is soft  There is no mass  Tenderness: There is no abdominal tenderness  Musculoskeletal:         General: Tenderness present  No deformity  Normal range of motion  Cervical back: Normal range of motion and neck supple  Right lower leg: No edema  Left lower leg: No edema  Comments: Mild tenderness over medial side of left knee noted  No limitation of movement  Lymphadenopathy:      Cervical: No cervical adenopathy  Skin:     General: Skin is warm  Findings: No erythema or rash  Neurological:      Mental Status: She is alert and oriented to person, place, and time  Cranial Nerves: No cranial nerve deficit  Psychiatric:         Mood and Affect: Mood normal          Behavior: Behavior normal          Thought Content:  Thought content normal          Judgment: Judgment normal

## 2021-07-29 ENCOUNTER — OFFICE VISIT (OUTPATIENT)
Dept: OBGYN CLINIC | Facility: HOSPITAL | Age: 61
End: 2021-07-29
Payer: COMMERCIAL

## 2021-07-29 ENCOUNTER — HOSPITAL ENCOUNTER (OUTPATIENT)
Dept: RADIOLOGY | Facility: HOSPITAL | Age: 61
Discharge: HOME | End: 2021-07-29
Payer: COMMERCIAL

## 2021-07-29 VITALS
SYSTOLIC BLOOD PRESSURE: 169 MMHG | HEIGHT: 67 IN | HEART RATE: 75 BPM | DIASTOLIC BLOOD PRESSURE: 99 MMHG | BODY MASS INDEX: 29.98 KG/M2 | WEIGHT: 191 LBS

## 2021-07-29 DIAGNOSIS — M25.562 LEFT KNEE PAIN, UNSPECIFIED CHRONICITY: Primary | ICD-10-CM

## 2021-07-29 DIAGNOSIS — M25.562 LEFT KNEE PAIN, UNSPECIFIED CHRONICITY: ICD-10-CM

## 2021-07-29 DIAGNOSIS — M17.12 PRIMARY OSTEOARTHRITIS OF LEFT KNEE: ICD-10-CM

## 2021-07-29 PROCEDURE — 73560 X-RAY EXAM OF KNEE 1 OR 2: CPT

## 2021-07-29 PROCEDURE — 99203 OFFICE O/P NEW LOW 30 MIN: CPT | Performed by: PHYSICIAN ASSISTANT

## 2021-07-29 PROCEDURE — 20610 DRAIN/INJ JOINT/BURSA W/O US: CPT | Performed by: PHYSICIAN ASSISTANT

## 2021-07-29 RX ORDER — LIDOCAINE HYDROCHLORIDE 10 MG/ML
2 INJECTION, SOLUTION INFILTRATION; PERINEURAL
Status: COMPLETED | OUTPATIENT
Start: 2021-07-29 | End: 2021-07-29

## 2021-07-29 RX ORDER — TRIAMCINOLONE ACETONIDE 40 MG/ML
80 INJECTION, SUSPENSION INTRA-ARTICULAR; INTRAMUSCULAR
Status: COMPLETED | OUTPATIENT
Start: 2021-07-29 | End: 2021-07-29

## 2021-07-29 RX ORDER — BUPIVACAINE HYDROCHLORIDE 5 MG/ML
2 INJECTION, SOLUTION EPIDURAL; INTRACAUDAL
Status: COMPLETED | OUTPATIENT
Start: 2021-07-29 | End: 2021-07-29

## 2021-07-29 RX ADMIN — TRIAMCINOLONE ACETONIDE 80 MG: 40 INJECTION, SUSPENSION INTRA-ARTICULAR; INTRAMUSCULAR at 18:57

## 2021-07-29 RX ADMIN — LIDOCAINE HYDROCHLORIDE 2 ML: 10 INJECTION, SOLUTION INFILTRATION; PERINEURAL at 18:57

## 2021-07-29 RX ADMIN — BUPIVACAINE HYDROCHLORIDE 2 ML: 5 INJECTION, SOLUTION EPIDURAL; INTRACAUDAL at 18:57

## 2021-07-29 NOTE — PROGRESS NOTES
Assessment/Plan   Diagnoses and all orders for this visit:    Left knee pain, unspecified chronicity    Primary osteoarthritis of left knee    - CSI today  - Ice, NSAIDs as needed  - Follow up with Dr Emma Carbajal in 3 months          Subjective   Patient ID: Sukhwinder Albrecht is a 64 y o  female  Vitals:    07/29/21 1833   BP: 169/99   Pulse: 76     61yo female comes in for an evaluation of her left knee  She started having mild pain after a fall in January  Then, about a week ago the pain significantly increased after doing a lot of squats  No fevers or chills  No clicking, catching, or locking  Intermittent swelling  The pain is dull in character, mild in severity, pain does not radiate and is not associated with numbness      The following portions of the patient's history were reviewed and updated as appropriate: allergies, current medications, past family history, past medical history, past social history, past surgical history and problem list   The following portions of the patient's history were reviewed and updated as appropriate: allergies, current medications, past family history, past medical history, past social history, past surgical history and problem list     Review of Systems  Ortho Exam  Past Medical History:   Diagnosis Date    Hypertension      Past Surgical History:   Procedure Laterality Date    ABLATION COLPOCLESIS      TUBAL LIGATION       Family History   Problem Relation Age of Onset    No Known Problems Mother     Diabetes Father     Glaucoma Father     No Known Problems Sister     No Known Problems Daughter     Breast cancer Maternal Grandmother         unknown age   [de-identified] No Known Problems Maternal Grandfather     Breast cancer Paternal Grandmother         unknown age   [de-identified] No Known Problems Paternal Grandfather     No Known Problems Son     No Known Problems Sister     No Known Problems Paternal Aunt      Social History     Occupational History    Not on file   Tobacco Use  Smoking status: Never Smoker    Smokeless tobacco: Never Used   Vaping Use    Vaping Use: Never used   Substance and Sexual Activity    Alcohol use: Yes     Alcohol/week: 1 0 standard drinks     Types: 1 Cans of beer per week     Comment: social    Drug use: Never    Sexual activity: Yes       Review of Systems   Constitutional: Negative  HENT: Negative  Eyes: Negative  Respiratory: Negative  Cardiovascular: Negative  Gastrointestinal: Negative  Endocrine: Negative  Genitourinary: Negative  Musculoskeletal: As below      Allergic/Immunologic: Negative  Neurological: Negative  Hematological: Negative  Psychiatric/Behavioral: Negative  Objective   Physical Exam      · Constitutional: Awake, Alert, Oriented  · Eyes: EOMI  · Psych: Mood and affect appropriate  · Heart: regular rate and rhythm  · Lungs: No audible wheezing  · Abdomen: soft  · Lymph: no lymphedema   left Knee:  - Appearance   No swelling, discoloration, deformity, or ecchymosis  - Effusion   none  - Palpation   + Tenderness medial joint line  No tenderness of the patella, patellar tendon, pes anserine, lateral joint line, MCL, LCL, medial / lateral plateau  - ROM   Extension: 0 and Flexion: 130  - Special Tests   Eulalia's Test negative, Lachman's Test negative, Anterior Drawer Test negative, Posterior Drawer Test negative, Valgus Stress Test negative, Varus Stress Test negative and Patellar apprehension negative  - Motor   normal 5/5 in all planes  - NVI distally    I have personally reviewed pertinent films in PACS and my interpretation is medial joint space narrowing  Large joint arthrocentesis: L knee  Universal Protocol:  Consent: Verbal consent obtained    Risks and benefits: risks, benefits and alternatives were discussed  Consent given by: patient  Time out: Immediately prior to procedure a "time out" was called to verify the correct patient, procedure, equipment, support staff and site/side marked as required    Timeout called at: 7/29/2021 6:56 PM   Patient understanding: patient states understanding of the procedure being performed  Site marked: the operative site was marked  Patient identity confirmed: verbally with patient    Supporting Documentation  Indications: pain   Procedure Details  Location: knee - L knee  Needle size: 22 G  Ultrasound guidance: no  Approach: lateral  Medications administered: 2 mL bupivacaine (PF) 0 5 %; 2 mL lidocaine 1 %; 80 mg triamcinolone acetonide 40 mg/mL    Patient tolerance: patient tolerated the procedure well with no immediate complications  Dressing:  Sterile dressing applied

## 2021-08-02 ENCOUNTER — TELEPHONE (OUTPATIENT)
Dept: OBGYN CLINIC | Facility: OTHER | Age: 61
End: 2021-08-02

## 2021-08-02 NOTE — TELEPHONE ENCOUNTER
Patient called in , stating she got Cortisone injection on 07-29  She is having an increase in pain and can barely walk today  What can you recommend till she follows up with Dr Elen Graf on 11-02      [facility] in [facility]    C/b # [phone]

## 2021-08-02 NOTE — TELEPHONE ENCOUNTER
Patient states that the injection is causing her more pain and swelling than before injection  Denies any s/s of infection redness, drainage at injection site, hot to touch or fever  Advised this can be normal after steroid injection as we irritate the tissues, can be worse before it gets better, takes a week to calm down and another week to get the full effect  Information above given to patient from abhilash on Aleve/Tylenol, RICE method  She is asking for a sooner appt with Dr Carlene Reece  She has a NP visit 11/2  Nurses will check on her in two days

## 2021-08-03 NOTE — TELEPHONE ENCOUNTER
Patient was checked on and she states she is still having same amount of pain and swelling to knee  Advised to give the knee a few more days and call us if no improvement  Patient verbalized understanding

## 2021-08-26 ENCOUNTER — OFFICE VISIT (OUTPATIENT)
Dept: INTERNAL MEDICINE CLINIC | Age: 61
End: 2021-08-26
Payer: COMMERCIAL

## 2021-08-26 VITALS
HEIGHT: 67 IN | WEIGHT: 189 LBS | BODY MASS INDEX: 29.66 KG/M2 | TEMPERATURE: 98.7 F | HEART RATE: 65 BPM | DIASTOLIC BLOOD PRESSURE: 70 MMHG | SYSTOLIC BLOOD PRESSURE: 124 MMHG | OXYGEN SATURATION: 97 %

## 2021-08-26 DIAGNOSIS — L30.9 DERMATITIS: ICD-10-CM

## 2021-08-26 DIAGNOSIS — Z12.11 SCREENING FOR MALIGNANT NEOPLASM OF COLON: ICD-10-CM

## 2021-08-26 DIAGNOSIS — Z00.00 ANNUAL PHYSICAL EXAM: ICD-10-CM

## 2021-08-26 DIAGNOSIS — I10 BENIGN ESSENTIAL HYPERTENSION: Primary | ICD-10-CM

## 2021-08-26 DIAGNOSIS — L55.1 SUNBURN, SECOND DEGREE: ICD-10-CM

## 2021-08-26 PROCEDURE — 99213 OFFICE O/P EST LOW 20 MIN: CPT | Performed by: PHYSICIAN ASSISTANT

## 2021-08-26 PROCEDURE — 99396 PREV VISIT EST AGE 40-64: CPT | Performed by: PHYSICIAN ASSISTANT

## 2021-08-26 RX ORDER — PREDNISONE 10 MG/1
TABLET ORAL
Qty: 20 TABLET | Refills: 0 | Status: SHIPPED | OUTPATIENT
Start: 2021-08-26 | End: 2021-10-21

## 2021-08-26 RX ORDER — BETAMETHASONE DIPROPIONATE 0.05 %
OINTMENT (GRAM) TOPICAL 2 TIMES DAILY
Qty: 45 G | Refills: 0 | Status: SHIPPED | OUTPATIENT
Start: 2021-08-26 | End: 2021-10-21 | Stop reason: SDUPTHER

## 2021-08-26 NOTE — PROGRESS NOTES
Assessment/Plan:         Diagnoses and all orders for this visit:    Benign essential hypertension  Comments:  well controlled     Screening for malignant neoplasm of colon  -     Cancel: Occult Blood, Fecal Immunochemical; Future  -     Occult Blood, Fecal Immunochemical; Future    Annual physical exam    Dermatitis  -     betamethasone dipropionate (DIPROSONE) 0 05 % ointment; Apply topically 2 (two) times a day  -     predniSONE 10 mg tablet; 4 tabs daily for 2 days then 3 tabs daily x 2 days then 2 tabs daily x 2 days then 1 tablet daily for 2 days  Sunburn, second degree  Comments:  keep skin moisturized   may use aloe  keep hydrated  pred taper in am with food  benadyl at bedtime x 3-5 days  Orders:  -     predniSONE 10 mg tablet; 4 tabs daily for 2 days then 3 tabs daily x 2 days then 2 tabs daily x 2 days then 1 tablet daily for 2 days  Dermatitis  Comments:  f/u in 1-2 weeks if any further bites or rash persist   Orders:  -     betamethasone dipropionate (DIPROSONE) 0 05 % ointment; Apply topically 2 (two) times a day  -     predniSONE 10 mg tablet; 4 tabs daily for 2 days then 3 tabs daily x 2 days then 2 tabs daily x 2 days then 1 tablet daily for 2 days  Subjective:      Patient ID: Diana De Leon is a 64 y o  female  63 y/o female with hx of htn, OA  Presents for f/u and PE  She notes she was in [employer] 1 week ago and has sand flea bites on her buttock  Using topical neosporin but area still irritated and pruritic  Pt requests short course of prednisone which she has taken in past - she states she overreacts to insect bites       The following portions of the patient's history were reviewed and updated as appropriate: allergies, current medications, past family history, past medical history, past social history, past surgical history and problem list     Review of Systems   Constitutional: Negative for chills, diaphoresis, fatigue and fever     HENT: Negative for congestion and sore throat  Eyes: Negative for pain and redness  Respiratory: Negative for cough, shortness of breath and wheezing  Cardiovascular: Negative for chest pain and leg swelling  Gastrointestinal: Negative for abdominal pain, constipation, diarrhea and nausea  Genitourinary: Negative for dysuria, genital sores and urgency  Musculoskeletal: Negative for arthralgias, back pain and gait problem  Skin: Positive for rash  Neurological: Negative for weakness, light-headedness and numbness  Psychiatric/Behavioral: Negative for sleep disturbance  The patient is not nervous/anxious  Past Medical History:   Diagnosis Date    Hypertension          Current Outpatient Medications:     Diclofenac Sodium (VOLTAREN) 1 %, Apply 2 g topically 4 (four) times a day, Disp: 100 g, Rfl: 1    ketoconazole (NIZORAL) 2 % shampoo, Apply 1 application topically 2 (two) times a week, Disp: 120 mL, Rfl: 3    Multiple Vitamins-Minerals (MULTIVITAMIN WITH MINERALS) tablet, Take 1 tablet by mouth daily, Disp: , Rfl:     traZODone (DESYREL) 50 mg tablet, Take 1 tablet (50 mg total) by mouth daily at bedtime, Disp: 30 tablet, Rfl: 1    betamethasone dipropionate (DIPROSONE) 0 05 % ointment, Apply topically 2 (two) times a day, Disp: 45 g, Rfl: 0    predniSONE 10 mg tablet, 4 tabs daily for 2 days then 3 tabs daily x 2 days then 2 tabs daily x 2 days then 1 tablet daily for 2 days  , Disp: 20 tablet, Rfl: 0    Allergies   Allergen Reactions    Other Itching and Rash     Dust, bugs        Social History   Past Surgical History:   Procedure Laterality Date    ABLATION COLPOCLESIS      TUBAL LIGATION       Family History   Problem Relation Age of Onset    No Known Problems Mother     Diabetes Father     Glaucoma Father     No Known Problems Sister     No Known Problems Daughter     Breast cancer Maternal Grandmother         unknown age   Ruth Hummel No Known Problems Maternal Grandfather     Breast cancer Paternal Grandmother         unknown age   Andreea Hernandez No Known Problems Paternal Grandfather     No Known Problems Son     No Known Problems Sister     No Known Problems Paternal Aunt        Objective:  /70 (BP Location: Left arm, Patient Position: Sitting, Cuff Size: Standard)   Pulse 65   Temp 98 7 °F (37 1 °C) (Temporal)   Ht 5' 7" (1 702 m)   Wt 85 7 kg (189 lb)   SpO2 97%   BMI 29 60 kg/m²        Physical Exam  Vitals reviewed  Constitutional:       General: She is not in acute distress  HENT:      Head: Normocephalic  Right Ear: Tympanic membrane, ear canal and external ear normal       Left Ear: Tympanic membrane, ear canal and external ear normal    Eyes:      Extraocular Movements: Extraocular movements intact  Conjunctiva/sclera: Conjunctivae normal       Pupils: Pupils are equal, round, and reactive to light  Cardiovascular:      Rate and Rhythm: Normal rate and regular rhythm  Pulmonary:      Effort: Pulmonary effort is normal       Breath sounds: Normal breath sounds  No wheezing, rhonchi or rales  Abdominal:      General: Abdomen is flat  Musculoskeletal:         General: Normal range of motion  Cervical back: Normal range of motion  Right lower leg: No edema  Left lower leg: No edema  Lymphadenopathy:      Cervical: No cervical adenopathy  Skin:     Findings: Rash (dermatitis L buttock - insect bites) present  No erythema  Neurological:      General: No focal deficit present  Mental Status: She is alert and oriented to person, place, and time     Psychiatric:         Mood and Affect: Mood normal          Behavior: Behavior normal

## 2021-08-26 NOTE — PROGRESS NOTES
Assessment and Plan:     Problem List Items Addressed This Visit        Cardiovascular and Mediastinum    Benign essential hypertension - Primary      Other Visit Diagnoses     Screening for malignant neoplasm of colon        Relevant Orders    Occult Blood, Fecal Immunochemical    Annual physical exam          Patient is encouraged to continue daily exercise with goal of 30-45 mins / day aerobic moderate impact workout, continue healthy well balanced diet with fruits and veggies, low sugar and low sodium intake  Encouraged to get 8 hours of sleep per night  Preventive health issues were discussed with patient, and age appropriate screening tests were ordered as noted in patient's After Visit Summary  Personalized health advice and appropriate referrals for health education or preventive services given if needed, as noted in patient's After Visit Summary  History of Present Illness:     Patient presents for comprehensive PE     Patient Care Team:  Kierra Hunt MD as PCP - General (Internal Medicine)     Review of Systems:     Review of Systems   Constitutional: Negative for activity change, appetite change, chills, diaphoresis, fatigue and fever  HENT: Negative for congestion and sore throat  Eyes: Negative for pain and redness  Respiratory: Negative for cough, shortness of breath and wheezing  Cardiovascular: Negative for chest pain, palpitations and leg swelling  Gastrointestinal: Negative for abdominal pain, constipation, diarrhea and nausea  Genitourinary: Negative for dysuria and flank pain  Musculoskeletal: Negative for arthralgias, back pain and gait problem  Skin: Negative for rash  Neurological: Negative for dizziness, light-headedness and headaches  Psychiatric/Behavioral: Negative for sleep disturbance  The patient is not nervous/anxious         Problem List:     Patient Active Problem List   Diagnosis    Exposure to COVID-19 virus    Benign essential hypertension  Gastroenteritis    Bursitis of left knee    Acute pain of left knee    Anxiety state    Leiomyoma of uterus      Past Medical and Surgical History:     Past Medical History:   Diagnosis Date    Hypertension      Past Surgical History:   Procedure Laterality Date    ABLATION COLPOCLESIS      TUBAL LIGATION        Family History:     Family History   Problem Relation Age of Onset    No Known Problems Mother     Diabetes Father     Glaucoma Father     No Known Problems Sister     No Known Problems Daughter     Breast cancer Maternal Grandmother         unknown age   Linda Birch No Known Problems Maternal Grandfather     Breast cancer Paternal Grandmother         unknown age   Linda Birch No Known Problems Paternal Grandfather     No Known Problems Son     No Known Problems Sister     No Known Problems Paternal Aunt       Social History:     Social History     Socioeconomic History    Marital status: Unknown     Spouse name: None    Number of children: None    Years of education: None    Highest education level: None   Occupational History    None   Tobacco Use    Smoking status: Never Smoker    Smokeless tobacco: Never Used   Vaping Use    Vaping Use: Never used   Substance and Sexual Activity    Alcohol use: Yes     Alcohol/week: 1 0 standard drinks     Types: 1 Cans of beer per week     Comment: social    Drug use: Never    Sexual activity: Yes   Other Topics Concern    None   Social History Narrative    None     Social Determinants of Health     Financial Resource Strain:     Difficulty of Paying Living Expenses:    Food Insecurity:     Worried About Running Out of Food in the Last Year:     Ran Out of Food in the Last Year:    Transportation Needs:     Lack of Transportation (Medical):      Lack of Transportation (Non-Medical):    Physical Activity:     Days of Exercise per Week:     Minutes of Exercise per Session:    Stress:     Feeling of Stress :    Social Connections:     Frequency of Communication with Friends and Family:     Frequency of Social Gatherings with Friends and Family:     Attends Confucianist Services:     Active Member of Clubs or Organizations:     Attends Club or Organization Meetings:     Marital Status:    Intimate Partner Violence:     Fear of Current or Ex-Partner:     Emotionally Abused:     Physically Abused:     Sexually Abused:       Medications and Allergies:     Current Outpatient Medications   Medication Sig Dispense Refill    Diclofenac Sodium (VOLTAREN) 1 % Apply 2 g topically 4 (four) times a day 100 g 1    ketoconazole (NIZORAL) 2 % shampoo Apply 1 application topically 2 (two) times a week 120 mL 3    Multiple Vitamins-Minerals (MULTIVITAMIN WITH MINERALS) tablet Take 1 tablet by mouth daily      traZODone (DESYREL) 50 mg tablet Take 1 tablet (50 mg total) by mouth daily at bedtime 30 tablet 1     No current facility-administered medications for this visit  Allergies   Allergen Reactions    Other Itching and Rash     Dust, bugs       Immunizations:     Immunization History   Administered Date(s) Administered    INFLUENZA 01/01/2005, 12/04/2014, 11/01/2015, 11/01/2015, 11/01/2020    SARS-CoV-2 / COVID-19 mRNA IM (Moderna) 01/02/2021, 01/30/2021    Td (adult), Unspecified 01/03/1997      Health Maintenance:         Topic Date Due    Hepatitis C Screening  Never done    HIV Screening  Never done    Colorectal Cancer Screening  Never done    Breast Cancer Screening: Mammogram  06/05/2022    Cervical Cancer Screening  08/01/2022         Topic Date Due    DTaP,Tdap,and Td Vaccines (1 - Tdap) 11/03/1983    Influenza Vaccine (1) 09/01/2021              Physical Exam:     /70 (BP Location: Left arm, Patient Position: Sitting, Cuff Size: Standard)   Pulse 65   Temp 98 7 °F (37 1 °C) (Temporal)   Ht 5' 7" (1 702 m)   Wt 85 7 kg (189 lb)   SpO2 97%   BMI 29 60 kg/m²     Physical Exam  Vitals reviewed     Constitutional:       General: She is not in acute distress  HENT:      Head: Normocephalic and atraumatic  Right Ear: Tympanic membrane, ear canal and external ear normal       Left Ear: Tympanic membrane, ear canal and external ear normal       Nose: Nose normal       Mouth/Throat:      Mouth: Mucous membranes are moist    Eyes:      General:         Right eye: No discharge  Left eye: No discharge  Extraocular Movements: Extraocular movements intact  Conjunctiva/sclera: Conjunctivae normal       Pupils: Pupils are equal, round, and reactive to light  Cardiovascular:      Rate and Rhythm: Normal rate and regular rhythm  Heart sounds: No murmur heard  Pulmonary:      Effort: Pulmonary effort is normal  No respiratory distress  Breath sounds: Normal breath sounds  No wheezing, rhonchi or rales  Abdominal:      General: Bowel sounds are normal  There is no distension  Tenderness: There is no abdominal tenderness  Musculoskeletal:         General: Normal range of motion  Cervical back: Normal range of motion  Right lower leg: No edema  Left lower leg: No edema  Lymphadenopathy:      Cervical: No cervical adenopathy  Skin:     General: Skin is warm  Findings: No erythema or rash  Neurological:      General: No focal deficit present  Mental Status: She is alert and oriented to person, place, and time     Psychiatric:         Mood and Affect: Mood normal          Behavior: Behavior normal           Lydia King PA-C

## 2021-09-04 ENCOUNTER — APPOINTMENT (OUTPATIENT)
Dept: LAB | Age: 61
End: 2021-09-04
Payer: COMMERCIAL

## 2021-09-04 DIAGNOSIS — I10 BENIGN ESSENTIAL HYPERTENSION: ICD-10-CM

## 2021-09-04 DIAGNOSIS — Z13.228 SCREENING FOR METABOLIC DISORDER: ICD-10-CM

## 2021-09-04 DIAGNOSIS — Z11.59 NEED FOR HEPATITIS C SCREENING TEST: ICD-10-CM

## 2021-09-04 DIAGNOSIS — F51.01 PRIMARY INSOMNIA: ICD-10-CM

## 2021-09-04 DIAGNOSIS — R73.01 IMPAIRED FASTING GLUCOSE: ICD-10-CM

## 2021-09-04 LAB
ALBUMIN SERPL BCP-MCNC: 3.5 G/DL (ref 3.5–5)
ALP SERPL-CCNC: 74 U/L (ref 46–116)
ALT SERPL W P-5'-P-CCNC: 38 U/L (ref 12–78)
ANION GAP SERPL CALCULATED.3IONS-SCNC: 3 MMOL/L (ref 4–13)
AST SERPL W P-5'-P-CCNC: 24 U/L (ref 5–45)
BASOPHILS # BLD AUTO: 0.07 THOUSANDS/ΜL (ref 0–0.1)
BASOPHILS NFR BLD AUTO: 1 % (ref 0–1)
BILIRUB SERPL-MCNC: 0.37 MG/DL (ref 0.2–1)
BUN SERPL-MCNC: 21 MG/DL (ref 5–25)
CALCIUM SERPL-MCNC: 9.2 MG/DL (ref 8.3–10.1)
CHLORIDE SERPL-SCNC: 109 MMOL/L (ref 100–108)
CHOLEST SERPL-MCNC: 205 MG/DL (ref 50–200)
CO2 SERPL-SCNC: 26 MMOL/L (ref 21–32)
CREAT SERPL-MCNC: 1.06 MG/DL (ref 0.6–1.3)
EOSINOPHIL # BLD AUTO: 0.03 THOUSAND/ΜL (ref 0–0.61)
EOSINOPHIL NFR BLD AUTO: 0 % (ref 0–6)
ERYTHROCYTE [DISTWIDTH] IN BLOOD BY AUTOMATED COUNT: 14.2 % (ref 11.6–15.1)
EST. AVERAGE GLUCOSE BLD GHB EST-MCNC: 111 MG/DL
GFR SERPL CREATININE-BSD FRML MDRD: 58 ML/MIN/1.73SQ M
GLUCOSE P FAST SERPL-MCNC: 88 MG/DL (ref 65–99)
HBA1C MFR BLD: 5.5 %
HCT VFR BLD AUTO: 40.5 % (ref 34.8–46.1)
HCV AB SER QL: NORMAL
HDLC SERPL-MCNC: 115 MG/DL
HGB BLD-MCNC: 12.9 G/DL (ref 11.5–15.4)
IMM GRANULOCYTES # BLD AUTO: 0.01 THOUSAND/UL (ref 0–0.2)
IMM GRANULOCYTES NFR BLD AUTO: 0 % (ref 0–2)
LDLC SERPL CALC-MCNC: 81 MG/DL (ref 0–100)
LYMPHOCYTES # BLD AUTO: 2.75 THOUSANDS/ΜL (ref 0.6–4.47)
LYMPHOCYTES NFR BLD AUTO: 34 % (ref 14–44)
MCH RBC QN AUTO: 29.1 PG (ref 26.8–34.3)
MCHC RBC AUTO-ENTMCNC: 31.9 G/DL (ref 31.4–37.4)
MCV RBC AUTO: 91 FL (ref 82–98)
MONOCYTES # BLD AUTO: 0.56 THOUSAND/ΜL (ref 0.17–1.22)
MONOCYTES NFR BLD AUTO: 7 % (ref 4–12)
NEUTROPHILS # BLD AUTO: 4.74 THOUSANDS/ΜL (ref 1.85–7.62)
NEUTS SEG NFR BLD AUTO: 58 % (ref 43–75)
NONHDLC SERPL-MCNC: 90 MG/DL
NRBC BLD AUTO-RTO: 0 /100 WBCS
PLATELET # BLD AUTO: 324 THOUSANDS/UL (ref 149–390)
PMV BLD AUTO: 9.6 FL (ref 8.9–12.7)
POTASSIUM SERPL-SCNC: 3.9 MMOL/L (ref 3.5–5.3)
PROT SERPL-MCNC: 7.8 G/DL (ref 6.4–8.2)
RBC # BLD AUTO: 4.44 MILLION/UL (ref 3.81–5.12)
SODIUM SERPL-SCNC: 138 MMOL/L (ref 136–145)
TRIGL SERPL-MCNC: 45 MG/DL
WBC # BLD AUTO: 8.16 THOUSAND/UL (ref 4.31–10.16)

## 2021-09-04 PROCEDURE — 80061 LIPID PANEL: CPT

## 2021-09-04 PROCEDURE — 80053 COMPREHEN METABOLIC PANEL: CPT

## 2021-09-04 PROCEDURE — 83036 HEMOGLOBIN GLYCOSYLATED A1C: CPT

## 2021-09-04 PROCEDURE — 86803 HEPATITIS C AB TEST: CPT

## 2021-09-04 PROCEDURE — 36415 COLL VENOUS BLD VENIPUNCTURE: CPT

## 2021-09-04 PROCEDURE — 85025 COMPLETE CBC W/AUTO DIFF WBC: CPT

## 2021-09-19 DIAGNOSIS — Z41.1 ENCOUNTER FOR COSMETIC PROCEDURE: Primary | ICD-10-CM

## 2021-09-19 RX ORDER — LIDOCAINE AND PRILOCAINE 25; 25 MG/G; MG/G
CREAM TOPICAL AS NEEDED
Qty: 30 G | Refills: 0 | Status: SHIPPED | OUTPATIENT
Start: 2021-09-19 | End: 2021-10-21

## 2021-09-20 NOTE — PROGRESS NOTES
Oral and Maxillofacial Surgery Note:    EMLA cream ordered for patient to bring to visit and will be applied prior to Juvaderm Volbella lip treatment    Bird Dominguez DDS

## 2021-09-23 DIAGNOSIS — F51.01 PRIMARY INSOMNIA: ICD-10-CM

## 2021-09-24 RX ORDER — TRAZODONE HYDROCHLORIDE 50 MG/1
50 TABLET ORAL
Qty: 90 TABLET | Refills: 1 | Status: SHIPPED | OUTPATIENT
Start: 2021-09-24 | End: 2021-09-30 | Stop reason: SDUPTHER

## 2021-09-30 NOTE — TELEPHONE ENCOUNTER
Pt called the office and stated she called last week to have this filled, she went to the pharmacy to  and it was never sent over  Is there anyway you are able to fill it?

## 2021-09-30 NOTE — TELEPHONE ENCOUNTER
LM for pt that Bharti [place] [address] [place] [facility] [place]

## 2021-10-21 ENCOUNTER — OFFICE VISIT (OUTPATIENT)
Dept: INTERNAL MEDICINE CLINIC | Age: 61
End: 2021-10-21
Payer: COMMERCIAL

## 2021-10-21 VITALS
HEART RATE: 69 BPM | DIASTOLIC BLOOD PRESSURE: 62 MMHG | TEMPERATURE: 98.1 F | HEIGHT: 67 IN | WEIGHT: 198 LBS | OXYGEN SATURATION: 99 % | SYSTOLIC BLOOD PRESSURE: 138 MMHG | BODY MASS INDEX: 31.08 KG/M2

## 2021-10-21 DIAGNOSIS — Z23 NEED FOR INFLUENZA VACCINATION: ICD-10-CM

## 2021-10-21 DIAGNOSIS — E66.9 OBESITY (BMI 30.0-34.9): ICD-10-CM

## 2021-10-21 DIAGNOSIS — L30.9 DERMATITIS: ICD-10-CM

## 2021-10-21 DIAGNOSIS — R63.5 WEIGHT GAIN: Primary | ICD-10-CM

## 2021-10-21 DIAGNOSIS — L29.9 PRURITUS OF SCALP: ICD-10-CM

## 2021-10-21 PROCEDURE — 90471 IMMUNIZATION ADMIN: CPT

## 2021-10-21 PROCEDURE — 99214 OFFICE O/P EST MOD 30 MIN: CPT | Performed by: PHYSICIAN ASSISTANT

## 2021-10-21 PROCEDURE — 90682 RIV4 VACC RECOMBINANT DNA IM: CPT

## 2021-10-21 RX ORDER — BETAMETHASONE DIPROPIONATE 0.05 %
OINTMENT (GRAM) TOPICAL 2 TIMES DAILY
Qty: 45 G | Refills: 5 | Status: SHIPPED | OUTPATIENT
Start: 2021-10-21

## 2021-10-21 RX ORDER — PHENTERMINE HYDROCHLORIDE 15 MG/1
15 CAPSULE ORAL EVERY MORNING
Qty: 30 CAPSULE | Refills: 0 | Status: SHIPPED | OUTPATIENT
Start: 2021-10-21 | End: 2021-11-18 | Stop reason: SDUPTHER

## 2021-10-21 RX ORDER — KETOCONAZOLE 20 MG/ML
1 SHAMPOO TOPICAL 2 TIMES WEEKLY
Qty: 120 ML | Refills: 5 | Status: SHIPPED | OUTPATIENT
Start: 2021-10-21 | End: 2022-02-17 | Stop reason: SDUPTHER

## 2021-10-22 ENCOUNTER — TELEPHONE (OUTPATIENT)
Dept: INTERNAL MEDICINE CLINIC | Age: 61
End: 2021-10-22

## 2021-11-02 ENCOUNTER — OFFICE VISIT (OUTPATIENT)
Dept: OBGYN CLINIC | Facility: HOSPITAL | Age: 61
End: 2021-11-02
Payer: COMMERCIAL

## 2021-11-02 VITALS
DIASTOLIC BLOOD PRESSURE: 97 MMHG | HEIGHT: 67 IN | SYSTOLIC BLOOD PRESSURE: 161 MMHG | HEART RATE: 70 BPM | BODY MASS INDEX: 31.27 KG/M2 | WEIGHT: 199.2 LBS

## 2021-11-02 DIAGNOSIS — M25.562 LEFT KNEE PAIN, UNSPECIFIED CHRONICITY: ICD-10-CM

## 2021-11-02 DIAGNOSIS — M17.12 PRIMARY OSTEOARTHRITIS OF LEFT KNEE: Primary | ICD-10-CM

## 2021-11-02 PROCEDURE — 99214 OFFICE O/P EST MOD 30 MIN: CPT | Performed by: ORTHOPAEDIC SURGERY

## 2021-11-02 PROCEDURE — 20610 DRAIN/INJ JOINT/BURSA W/O US: CPT | Performed by: ORTHOPAEDIC SURGERY

## 2021-11-02 RX ORDER — KETOROLAC TROMETHAMINE 30 MG/ML
60 INJECTION, SOLUTION INTRAMUSCULAR; INTRAVENOUS
Status: COMPLETED | OUTPATIENT
Start: 2021-11-02 | End: 2021-11-02

## 2021-11-02 RX ORDER — METHYLPREDNISOLONE ACETATE 40 MG/ML
2 INJECTION, SUSPENSION INTRA-ARTICULAR; INTRALESIONAL; INTRAMUSCULAR; SOFT TISSUE
Status: COMPLETED | OUTPATIENT
Start: 2021-11-02 | End: 2021-11-02

## 2021-11-02 RX ORDER — BUPIVACAINE HYDROCHLORIDE 2.5 MG/ML
2 INJECTION, SOLUTION INFILTRATION; PERINEURAL
Status: COMPLETED | OUTPATIENT
Start: 2021-11-02 | End: 2021-11-02

## 2021-11-02 RX ADMIN — BUPIVACAINE HYDROCHLORIDE 2 ML: 2.5 INJECTION, SOLUTION INFILTRATION; PERINEURAL at 15:22

## 2021-11-02 RX ADMIN — KETOROLAC TROMETHAMINE 60 MG: 30 INJECTION, SOLUTION INTRAMUSCULAR; INTRAVENOUS at 15:24

## 2021-11-02 RX ADMIN — METHYLPREDNISOLONE ACETATE 2 ML: 40 INJECTION, SUSPENSION INTRA-ARTICULAR; INTRALESIONAL; INTRAMUSCULAR; SOFT TISSUE at 15:22

## 2021-11-18 DIAGNOSIS — F51.01 PRIMARY INSOMNIA: ICD-10-CM

## 2021-11-18 DIAGNOSIS — E66.9 OBESITY (BMI 30.0-34.9): ICD-10-CM

## 2021-11-18 DIAGNOSIS — L29.9 PRURITUS OF SCALP: ICD-10-CM

## 2021-11-18 RX ORDER — KETOCONAZOLE 20 MG/ML
1 SHAMPOO TOPICAL 2 TIMES WEEKLY
Qty: 120 ML | Refills: 0 | Status: CANCELLED | OUTPATIENT
Start: 2021-11-18

## 2021-11-18 RX ORDER — TRAZODONE HYDROCHLORIDE 50 MG/1
50 TABLET ORAL
Qty: 90 TABLET | Refills: 0 | Status: CANCELLED | OUTPATIENT
Start: 2021-11-18

## 2021-11-23 ENCOUNTER — TELEMEDICINE (OUTPATIENT)
Dept: INTERNAL MEDICINE CLINIC | Age: 61
End: 2021-11-23
Payer: COMMERCIAL

## 2021-11-23 VITALS — WEIGHT: 199 LBS | HEIGHT: 67 IN | BODY MASS INDEX: 31.23 KG/M2

## 2021-11-23 DIAGNOSIS — B34.9 VIRAL INFECTION, UNSPECIFIED: ICD-10-CM

## 2021-11-23 DIAGNOSIS — Z20.822 EXPOSURE TO COVID-19 VIRUS: ICD-10-CM

## 2021-11-23 PROCEDURE — 99213 OFFICE O/P EST LOW 20 MIN: CPT | Performed by: NURSE PRACTITIONER

## 2021-11-23 PROCEDURE — U0003 INFECTIOUS AGENT DETECTION BY NUCLEIC ACID (DNA OR RNA); SEVERE ACUTE RESPIRATORY SYNDROME CORONAVIRUS 2 (SARS-COV-2) (CORONAVIRUS DISEASE [COVID-19]), AMPLIFIED PROBE TECHNIQUE, MAKING USE OF HIGH THROUGHPUT TECHNOLOGIES AS DESCRIBED BY CMS-2020-01-R: HCPCS | Performed by: NURSE PRACTITIONER

## 2021-11-23 PROCEDURE — U0005 INFEC AGEN DETEC AMPLI PROBE: HCPCS | Performed by: NURSE PRACTITIONER

## 2021-11-24 RX ORDER — PHENTERMINE HYDROCHLORIDE 15 MG/1
15 CAPSULE ORAL EVERY MORNING
Qty: 30 CAPSULE | Refills: 0 | Status: SHIPPED | OUTPATIENT
Start: 2021-11-24 | End: 2021-12-02

## 2021-12-02 ENCOUNTER — OFFICE VISIT (OUTPATIENT)
Dept: INTERNAL MEDICINE CLINIC | Age: 61
End: 2021-12-02
Payer: COMMERCIAL

## 2021-12-02 VITALS
SYSTOLIC BLOOD PRESSURE: 128 MMHG | HEIGHT: 67 IN | OXYGEN SATURATION: 98 % | DIASTOLIC BLOOD PRESSURE: 84 MMHG | BODY MASS INDEX: 31.01 KG/M2 | WEIGHT: 197.6 LBS | TEMPERATURE: 97.8 F | HEART RATE: 95 BPM

## 2021-12-02 DIAGNOSIS — F41.1 ANXIETY STATE: Primary | ICD-10-CM

## 2021-12-02 DIAGNOSIS — F51.01 PRIMARY INSOMNIA: ICD-10-CM

## 2021-12-02 DIAGNOSIS — E66.9 OBESITY (BMI 30.0-34.9): ICD-10-CM

## 2021-12-02 PROCEDURE — 99213 OFFICE O/P EST LOW 20 MIN: CPT | Performed by: PHYSICIAN ASSISTANT

## 2021-12-02 RX ORDER — PHENTERMINE HYDROCHLORIDE 30 MG/1
30 CAPSULE ORAL EVERY MORNING
Qty: 30 CAPSULE | Refills: 0 | Status: SHIPPED | OUTPATIENT
Start: 2021-12-02 | End: 2022-01-13 | Stop reason: SDUPTHER

## 2021-12-02 RX ORDER — TRAZODONE HYDROCHLORIDE 100 MG/1
100 TABLET ORAL
Qty: 30 TABLET | Refills: 2 | Status: SHIPPED | OUTPATIENT
Start: 2021-12-02 | End: 2022-02-10 | Stop reason: SDUPTHER

## 2022-01-13 DIAGNOSIS — E66.9 OBESITY (BMI 30.0-34.9): ICD-10-CM

## 2022-01-13 RX ORDER — PHENTERMINE HYDROCHLORIDE 30 MG/1
30 CAPSULE ORAL EVERY MORNING
Qty: 30 CAPSULE | Refills: 0 | Status: SHIPPED | OUTPATIENT
Start: 2022-01-13 | End: 2022-02-17 | Stop reason: SDUPTHER

## 2022-02-09 ENCOUNTER — TELEPHONE (OUTPATIENT)
Dept: INTERNAL MEDICINE CLINIC | Age: 62
End: 2022-02-09

## 2022-02-09 DIAGNOSIS — F51.01 PRIMARY INSOMNIA: ICD-10-CM

## 2022-02-10 RX ORDER — TRAZODONE HYDROCHLORIDE 100 MG/1
100 TABLET ORAL
Qty: 30 TABLET | Refills: 2 | Status: SHIPPED | OUTPATIENT
Start: 2022-02-10 | End: 2022-05-23 | Stop reason: SDUPTHER

## 2022-02-17 DIAGNOSIS — L29.9 PRURITUS OF SCALP: ICD-10-CM

## 2022-02-17 DIAGNOSIS — E66.9 OBESITY (BMI 30.0-34.9): ICD-10-CM

## 2022-02-17 RX ORDER — KETOCONAZOLE 20 MG/ML
1 SHAMPOO TOPICAL 2 TIMES WEEKLY
Qty: 120 ML | Refills: 5 | Status: SHIPPED | OUTPATIENT
Start: 2022-02-17

## 2022-02-17 RX ORDER — PHENTERMINE HYDROCHLORIDE 30 MG/1
30 CAPSULE ORAL EVERY MORNING
Qty: 30 CAPSULE | Refills: 0 | Status: SHIPPED | OUTPATIENT
Start: 2022-02-17 | End: 2022-03-31 | Stop reason: SDUPTHER

## 2022-02-23 ENCOUNTER — TELEPHONE (OUTPATIENT)
Dept: INTERNAL MEDICINE CLINIC | Age: 62
End: 2022-02-23

## 2022-02-23 NOTE — TELEPHONE ENCOUNTER
rec'd prior auth for medication:    phentermine 30 MG capsule         Scanning into media and putting into bin

## 2022-02-24 ENCOUNTER — OFFICE VISIT (OUTPATIENT)
Dept: INTERNAL MEDICINE CLINIC | Age: 62
End: 2022-02-24
Payer: COMMERCIAL

## 2022-02-24 VITALS
HEART RATE: 62 BPM | OXYGEN SATURATION: 100 % | HEIGHT: 67 IN | BODY MASS INDEX: 31.39 KG/M2 | SYSTOLIC BLOOD PRESSURE: 142 MMHG | TEMPERATURE: 97.2 F | DIASTOLIC BLOOD PRESSURE: 68 MMHG | WEIGHT: 200 LBS

## 2022-02-24 DIAGNOSIS — F51.01 PRIMARY INSOMNIA: Primary | ICD-10-CM

## 2022-02-24 DIAGNOSIS — F41.1 ANXIETY STATE: ICD-10-CM

## 2022-02-24 PROCEDURE — 99213 OFFICE O/P EST LOW 20 MIN: CPT | Performed by: PHYSICIAN ASSISTANT

## 2022-02-24 NOTE — PROGRESS NOTES
Assessment/Plan:         Diagnoses and all orders for this visit:    Primary insomnia  Comments:  continue trazodone prn  discussed sleep architecture     Anxiety state        Discussed use of phentermine, may continue on condition she continues with exercise and diet regimen, will plan for 1-2 more months and reassess at that time   Patient is encouraged to continue daily exercise with goal of 30-45 mins / day aerobic moderate impact workout, continue healthy well balanced diet with fruits and veggies, low sugar and low sodium intake  Encouraged to get 8 hours of sleep per night  Subjective:      Patient ID: Loretta Cruz is a 63 y o  female  64 y/o female with hx of anxiety, insomnia  Pt has been stuggling to lose weight, is taking phentermine but has not seen good results over the past 1 month as she states she was not exercising and had not been watching her diet due to increased stress in feb  She also reports she is usually more active at work but has not been as active lately  The following portions of the patient's history were reviewed and updated as appropriate: allergies, current medications, past family history, past medical history, past social history, past surgical history and problem list     Review of Systems   Constitutional: Negative for activity change, appetite change, chills, diaphoresis, fatigue and fever  HENT: Negative for congestion, sneezing and sore throat  Eyes: Negative for pain and redness  Respiratory: Negative for cough and shortness of breath  Cardiovascular: Negative for chest pain, palpitations and leg swelling  Gastrointestinal: Negative for abdominal pain, constipation, diarrhea and nausea  Genitourinary: Negative for dysuria  Musculoskeletal: Negative for arthralgias and back pain  Skin: Positive for rash (perioral dermatitis )  Neurological: Negative for dizziness, light-headedness and headaches     Psychiatric/Behavioral: Positive for sleep disturbance (improvement with trazodone )  The patient is nervous/anxious  Past Medical History:   Diagnosis Date    Hypertension          Current Outpatient Medications:     betamethasone dipropionate (DIPROSONE) 0 05 % ointment, Apply topically 2 (two) times a day, Disp: 45 g, Rfl: 5    ketoconazole (NIZORAL) 2 % shampoo, Apply 1 application topically 2 (two) times a week, Disp: 120 mL, Rfl: 5    Multiple Vitamins-Minerals (MULTIVITAMIN WITH MINERALS) tablet, Take 1 tablet by mouth daily, Disp: , Rfl:     phentermine 30 MG capsule, Take 1 capsule (30 mg total) by mouth every morning, Disp: 30 capsule, Rfl: 0    traZODone (DESYREL) 100 mg tablet, Take 1 tablet (100 mg total) by mouth daily at bedtime, Disp: 30 tablet, Rfl: 2    Allergies   Allergen Reactions    Other Itching and Rash     Dust, bugs        Social History   Past Surgical History:   Procedure Laterality Date    ABLATION COLPOCLESIS      TUBAL LIGATION       Family History   Problem Relation Age of Onset    No Known Problems Mother     Diabetes Father     Glaucoma Father     No Known Problems Sister     No Known Problems Daughter     Breast cancer Maternal Grandmother         unknown age   Carl Pert No Known Problems Maternal Grandfather     Breast cancer Paternal Grandmother         unknown age   Carl Pert No Known Problems Paternal Grandfather     No Known Problems Son     No Known Problems Sister     No Known Problems Paternal Aunt        Objective:  /68   Pulse 62   Temp (!) 97 2 °F (36 2 °C) (Temporal)   Ht 5' 7" (1 702 m)   Wt 90 7 kg (200 lb)   SpO2 100%   BMI 31 32 kg/m²        Physical Exam  Vitals reviewed  Constitutional:       General: She is not in acute distress  HENT:      Head: Normocephalic and atraumatic  Right Ear: Tympanic membrane, ear canal and external ear normal  There is no impacted cerumen  Left Ear: Tympanic membrane, ear canal and external ear normal  There is no impacted cerumen     Eyes: General:         Right eye: No discharge  Left eye: No discharge  Extraocular Movements: Extraocular movements intact  Conjunctiva/sclera: Conjunctivae normal       Pupils: Pupils are equal, round, and reactive to light  Cardiovascular:      Rate and Rhythm: Normal rate and regular rhythm  Pulmonary:      Effort: Pulmonary effort is normal  No respiratory distress  Breath sounds: Normal breath sounds  No wheezing or rales  Musculoskeletal:         General: Normal range of motion  Cervical back: Normal range of motion  Right lower leg: No edema  Left lower leg: No edema  Lymphadenopathy:      Cervical: No cervical adenopathy  Skin:     General: Skin is warm  Findings: No erythema or rash  Neurological:      General: No focal deficit present  Mental Status: She is alert and oriented to person, place, and time     Psychiatric:         Mood and Affect: Mood normal          Behavior: Behavior normal

## 2022-03-31 ENCOUNTER — TELEMEDICINE (OUTPATIENT)
Dept: INTERNAL MEDICINE CLINIC | Age: 62
End: 2022-03-31
Payer: COMMERCIAL

## 2022-03-31 DIAGNOSIS — J06.9 VIRAL UPPER RESPIRATORY TRACT INFECTION: ICD-10-CM

## 2022-03-31 DIAGNOSIS — Z12.31 ENCOUNTER FOR SCREENING MAMMOGRAM FOR MALIGNANT NEOPLASM OF BREAST: Primary | ICD-10-CM

## 2022-03-31 DIAGNOSIS — E66.9 OBESITY (BMI 30.0-34.9): ICD-10-CM

## 2022-03-31 DIAGNOSIS — J01.10 ACUTE NON-RECURRENT FRONTAL SINUSITIS: ICD-10-CM

## 2022-03-31 DIAGNOSIS — Z20.822 EXPOSURE TO COVID-19 VIRUS: ICD-10-CM

## 2022-03-31 PROCEDURE — 99213 OFFICE O/P EST LOW 20 MIN: CPT | Performed by: PHYSICIAN ASSISTANT

## 2022-03-31 RX ORDER — PHENTERMINE HYDROCHLORIDE 30 MG/1
30 CAPSULE ORAL EVERY MORNING
Qty: 30 CAPSULE | Refills: 0 | Status: SHIPPED | OUTPATIENT
Start: 2022-03-31 | End: 2022-05-05 | Stop reason: SDUPTHER

## 2022-03-31 RX ORDER — AZITHROMYCIN 250 MG/1
TABLET, FILM COATED ORAL
Qty: 6 TABLET | Refills: 0 | Status: SHIPPED | OUTPATIENT
Start: 2022-03-31 | End: 2022-04-05

## 2022-03-31 NOTE — PROGRESS NOTES
COVID-19 Outpatient Progress Note    Assessment/Plan:    Problem List Items Addressed This Visit        Other    Exposure to COVID-19 virus    Relevant Orders    COVID Only- Collected at [facility] or [facility]      Other Visit Diagnoses     Encounter for screening mammogram for malignant neoplasm of breast    -  Primary    Relevant Orders    Mammo screening bilateral w 3d & cad    Obesity (BMI 30 0-34 9)        discussed weight loss goals  increase daily exercise   may increase phentermine dose x 1 month then will need to stop     Relevant Medications    phentermine 30 MG capsule    Viral upper respiratory tract infection        Acute non-recurrent frontal sinusitis        Relevant Medications    azithromycin (Zithromax) 250 mg tablet         Disposition:     Referred patient to centralized site to test for COVID-19  Patient is fully vaccinated and I recommended self quarantine for 5 days followed by strict mask use for an additional 5 days  If patient were to develop symptoms, they should immediately self isolate and call our office for further guidance  covid test home negative  To quarantine for 5 days then wear mask for at least 10  zpak for sinusitis     I have spent 15 minutes directly with the patient  Greater than 50% of this time was spent in counseling/coordination of care regarding: instructions for management and patient and family education  Pt to do home covid test and will call back this afternoon      Encounter provider Jaycee Khan PA-C    Provider located at [address] [id number]    Recent Visits  No visits were found meeting these conditions    Showing recent visits within past 7 days and meeting all other requirements  Today's Visits  Date Type Provider Dept   03/31/22 Telemedicine Jaycee Khan PA-C [facility]   Showing today's visits and meeting all other requirements  Future Appointments  No visits were found meeting these conditions  Showing future appointments within next 150 days and meeting all other requirements     This virtual check-in was done via [facility] and patient was informed that this is a secure, HIPAA-compliant platform  She agrees to proceed  Patient agrees to participate in a virtual check in via telephone or video visit instead of presenting to the office to address urgent/immediate medical needs  Patient is aware this is a billable service  After connecting through [facility], the patient was identified by name and date of birth  Rubi Lopez was informed that this was a telemedicine visit and that the exam was being conducted confidentially over secure lines  My office door was closed  No one else was in the room  Rubi Lopez acknowledged consent and understanding of privacy and security of the telemedicine visit  I informed the patient that I have reviewed her record in Epic and presented the opportunity for her to ask any questions regarding the visit today  The patient agreed to participate  Verification of patient location:  Patient is located in the following state in which I hold an active license: [place]    Subjective:   Rubi Lopez is a 63 y o  female who is concerned about COVID-19  Patient's symptoms include fever, chills, fatigue, malaise, nasal congestion, cough, myalgias and headache  Patient denies rhinorrhea, sore throat, anosmia, loss of taste, shortness of breath, chest tightness, abdominal pain, nausea, vomiting and diarrhea       - Date of symptom onset: 3/28/2022      COVID-19 vaccination status: Fully vaccinated (primary series)    Exposure:   Contact with a person who is under investigation (PUI) for or who is positive for COVID-19 within the last 14 days?: No    Hospitalized recently for fever and/or lower respiratory symptoms?: No      Currently a healthcare worker that is involved in direct patient care?: No      Works in a special setting where the risk of COVID-19 transmission may be high? (this may include long-term care, correctional and custodial facilities; homeless shelters; assisted-living facilities and group homes ): No      Resident in a special setting where the risk of COVID-19 transmission may be high? (this may include long-term care, correctional and custodial facilities; homeless shelters; assisted-living facilities and group homes ): No      Pt reports started feeling bad suddenly Monday night  Had fever, woke up sweating last night and thinks her fever broke  Has home covid test but did not yet take this     Lab Results   Component Value Date    SARSCOV2 Negative 11/23/2021    SARSCOV2 Not Detected 12/01/2020     Past Medical History:   Diagnosis Date    Hypertension      Past Surgical History:   Procedure Laterality Date    ABLATION COLPOCLESIS      TUBAL LIGATION       Current Outpatient Medications   Medication Sig Dispense Refill    betamethasone dipropionate (DIPROSONE) 0 05 % ointment Apply topically 2 (two) times a day 45 g 5    ketoconazole (NIZORAL) 2 % shampoo Apply 1 application topically 2 (two) times a week 120 mL 5    Multiple Vitamins-Minerals (MULTIVITAMIN WITH MINERALS) tablet Take 1 tablet by mouth daily      phentermine 30 MG capsule Take 1 capsule (30 mg total) by mouth every morning 30 capsule 0    traZODone (DESYREL) 100 mg tablet Take 1 tablet (100 mg total) by mouth daily at bedtime 30 tablet 2    azithromycin (Zithromax) 250 mg tablet Take 2 tablets (500 mg total) by mouth daily for 1 day, THEN 1 tablet (250 mg total) daily for 4 days  6 tablet 0     No current facility-administered medications for this visit  Allergies   Allergen Reactions    Other Itching and Rash     Dust, bugs        Review of Systems   Constitutional: Positive for chills, fatigue and fever  HENT: Positive for congestion  Negative for rhinorrhea and sore throat  Respiratory: Positive for cough  Negative for chest tightness and shortness of breath  Gastrointestinal: Negative for abdominal pain, diarrhea, nausea and vomiting  Musculoskeletal: Positive for myalgias  Neurological: Positive for headaches  Objective: There were no vitals filed for this visit  Physical Exam  Constitutional:       General: She is not in acute distress  Pulmonary:      Effort: Pulmonary effort is normal  No respiratory distress  Neurological:      General: No focal deficit present  Mental Status: She is alert  VIRTUAL VISIT DISCLAIMER    Oleskandr Coello verbally agrees to participate in [employer]  Pt is aware that [employer] could be limited without vital signs or the ability to perform a full hands-on physical Dex Plummer understands she or the provider may request at any time to terminate the video visit and request the patient to seek care or treatment in person

## 2022-03-31 NOTE — LETTER
March 31, 2022     Patient: Eleanor Smiley   YOB: 1960   Date of Visit: 3/31/2022       To Whom it May Concern:    Eleanor Smiley is under my professional care  She was seen in my office on 3/31/2022  She may return to work on 4/4/22  If you have any questions or concerns, please don't hesitate to call           Sincerely,          Raleigh Vallejo PA-C        CC: No Recipients

## 2022-05-05 DIAGNOSIS — E66.9 OBESITY (BMI 30.0-34.9): ICD-10-CM

## 2022-05-05 RX ORDER — PHENTERMINE HYDROCHLORIDE 30 MG/1
30 CAPSULE ORAL EVERY MORNING
Qty: 30 CAPSULE | Refills: 0 | Status: SHIPPED | OUTPATIENT
Start: 2022-05-05

## 2022-05-05 NOTE — TELEPHONE ENCOUNTER
Patient is calling to request the Phentermine 30 mg one capsule a day    Patient had to cancel her appt for today due to a migraine and doesn't want to drive to the appointment    Please send into [address] in [place]

## 2022-05-18 ENCOUNTER — TELEMEDICINE (OUTPATIENT)
Dept: INTERNAL MEDICINE CLINIC | Age: 62
End: 2022-05-18
Payer: COMMERCIAL

## 2022-05-18 DIAGNOSIS — U07.1 COVID-19: Primary | ICD-10-CM

## 2022-05-18 PROCEDURE — 99213 OFFICE O/P EST LOW 20 MIN: CPT | Performed by: PHYSICIAN ASSISTANT

## 2022-05-18 NOTE — PROGRESS NOTES
COVID-19 Outpatient Progress Note    Assessment/Plan:    Problem List Items Addressed This Visit    None     Visit Diagnoses     COVID-19    -  Primary    Relevant Medications    nirmatrelvir & ritonavir (Paxlovid) tablet therapy pack       may use robutussin prn cough  Discussed se profile paxlovid - report any se immediately  Dose adjusted due to recent gfr 58  Hold phentermine while taking this medication    Pt counseled to take a daily multivitamin  wash hands liberally with soap and water for at least 20 seconds at a time and wear a face mask when in the presence of any other person  if symptoms persist or worsen pt should call the office or go to the ED for worsening SOB, chest pain, fever or an inability to tolerate oral intake    Disposition:     Recommended patient to come to the office to test for COVID-19  Patient meets criteria for PAXLOVID and they have been counseled appropriately according to EUA documentation released by the FDA  After discussion, patient agrees to treatment  Clinton Porter is an investigational medicine used to treat mild-to-moderate COVID-19 in adults and children (15years of age and older weighing at least 80 pounds (40 kg)) with positive results of direct SARS-CoV-2 viral testing, and who are at high risk for progression to severe COVID-19, including hospitalization or death  PAXLOVID is investigational because it is still being studied  There is limited information about the safety and effectiveness of using PAXLOVID to treat people with mild-to-moderate COVID-19  The FDA has authorized the emergency use of PAXLOVID for the treatment of mild-tomoderate COVID-19 in adults and children (15years of age and older weighing at least 80 pounds (40 kg)) with a positive test for the virus that causes COVID-19, and who are at high risk for progression to severe COVID-19, including hospitalization or death, under an EUA       What should I tell my healthcare provider before I take PAXLOVID? Tell your healthcare provider if you:  - Have any allergies  - Have liver or kidney disease  - Are pregnant or plan to become pregnant  - Are breastfeeding a child  - Have any serious illnesses    Tell your healthcare provider about all the medicines you take, including prescription and over-the-counter medicines, vitamins, and herbal supplements  Some medicines may interact with PAXLOVID and may cause serious side effects  Keep a list of your medicines to show your healthcare provider and pharmacist when you get a new medicine  You can ask your healthcare provider or pharmacist for a list of medicines that interact with PAXLOVID  Do not start taking a new medicine without telling your healthcare provider  Your healthcare provider can tell you if it is safe to take PAXLOVID with other medicines  Tell your healthcare provider if you are taking combined hormonal contraceptive  PAXLOVID may affect how your birth control pills work  Females who are able to become pregnant should use another effective alternative form of contraception or an additional barrier method of contraception  Talk to your healthcare provider if you have any questions about contraceptive methods that might be right for you  How do I take PAXLOVID? PAXLOVID consists of 2 medicines: nirmatrelvir and ritonavir  - Take 2 pink tablets of nirmatrelvir with 1 white tablet of ritonavir by mouth 2 times each day (in the morning and in the evening) for 5 days  For each dose, take all 3 tablets at the same time  - If you have kidney disease, talk to your healthcare provider  You may need a different dose  - Swallow the tablets whole  Do not chew, break, or crush the tablets  - Take PAXLOVID with or without food  - Do not stop taking PAXLOVID without talking to your healthcare provider, even if you feel better  - If you miss a dose of PAXLOVID within 8 hours of the time it is usually taken, take it as soon as you remember   If you miss a dose by more than 8 hours, skip the missed dose and take the next dose at your regular time  Do not take 2 doses of PAXLOVID at the same time  - If you take too much PAXLOVID, call your healthcare provider or go to the nearest hospital emergency room right away  - If you are taking a ritonavir- or cobicistat-containing medicine to treat hepatitis C or Human Immunodeficiency Virus (HIV), you should continue to take your medicine as prescribed by your healthcare provider   - Talk to your healthcare provider if you do not feel better or if you feel worse after 5 days  Who should generally not take PAXLOVID? Do not take PAXLOVID if:  You are allergic to nirmatrelvir, ritonavir, or any of the ingredients in PAXLOVID  You are taking any of the following medicines:  - Alfuzosin  - Pethidine, piroxicam, propoxyphene  - Ranolazine  - Amiodarone, dronedarone, flecainide, propafenone, quinidine  - Colchicine  - Lurasidone, pimozide, clozapine  - Dihydroergotamine, ergotamine, methylergonovine  - Lovastatin, simvastatin  - Sildenafil (Revatio®) for pulmonary arterial hypertension (PAH)  - Triazolam, oral midazolam  - Apalutamide  - Carbamazepine, phenobarbital, phenytoin  - Rifampin  - St  Chenchos Wort (hypericum perforatum)    What are the important possible side effects of PAXLOVID? Possible side effects of PAXLOVID are:  - Liver Problems  Tell your healthcare provider right away if you have any of these signs and symptoms of liver problems: loss of appetite, yellowing of your skin and the whites of eyes (jaundice), dark-colored urine, pale colored stools and itchy skin, stomach area (abdominal) pain  - Resistance to HIV Medicines  If you have untreated HIV infection, PAXLOVID may lead to some HIV medicines not working as well in the future    - Other possible side effects include: altered sense of taste, diarrhea, high blood pressure, or muscle aches    These are not all the possible side effects of PAXLOVID  Not many people have taken PAXLOVID  Serious and unexpected side effects may happen  Angelito De Jesus is still being studied, so it is possible that all of the risks are not known at this time  What other treatment choices are there? Like Chepe Grewal may allow for the emergency use of other medicines to treat people with COVID-19  Go to https://Blip/ for information on the emergency use of other medicines that are authorized by FDA to treat people with COVID-19  Your healthcare provider may talk with you about clinical trials for which you may be eligible  It is your choice to be treated or not to be treated with PAXLOVID  Should you decide not to receive it or for your child not to receive it, it will not change your standard medical care  What if I am pregnant or breastfeeding? There is no experience treating pregnant women or breastfeeding mothers with PAXLOVID  For a mother and unborn baby, the benefit of taking PAXLOVID may be greater than the risk from the treatment  If you are pregnant, discuss your options and specific situation with your healthcare provider  It is recommended that you use effective barrier contraception or do not have sexual activity while taking PAXLOVID  If you are breastfeeding, discuss your options and specific situation with your healthcare provider  How do I report side effects with PAXLOVID? Contact your healthcare provider if you have any side effects that bother you or do not go away  Report side effects to FDA MedWatch at www fda gov/medwatch or call 3-684-EEY2344 or you can report side effects to [facility]  at the contact information provided below  Website Fax number Telephone number   [employer] [phone]     How should I store Angelito De Jesus?     Store PAXLOVID tablets at room temperature between 68°F to 77°F (20°C to 25°C)  Full fact sheet for patients, parents, and caregivers can be found at: Ro[employer]    I have spent 15 minutes directly with the patient  Greater than 50% of this time was spent in counseling/coordination of care regarding: instructions for management and patient and family education  Encounter provider Faith Pang PA-C    Provider located at [address] [address]    Recent Visits  No visits were found meeting these conditions  Showing recent visits within past 7 days and meeting all other requirements  Today's Visits  Date Type Provider Dept   05/18/22 Telemedicine Faith Pang PA-C [facility]   Showing today's visits and meeting all other requirements  Future Appointments  No visits were found meeting these conditions  Showing future appointments within next 150 days and meeting all other requirements     This virtual check-in was done via telephone and she agrees to proceed  Patient agrees to participate in a virtual check in via telephone or video visit instead of presenting to the office to address urgent/immediate medical needs  Patient is aware this is a billable service  After connecting through Telephone, the patient was identified by name and date of birth  Sherie Barron was informed that this was a telemedicine visit and that the exam was being conducted confidentially over secure lines  My office door was closed  No one else was in the room  Sherie Barron acknowledged consent and understanding of privacy and security of the telemedicine visit  I informed the patient that I have reviewed her record in Epic and presented the opportunity for her to ask any questions regarding the visit today  The patient agreed to participate      It was my intent to perform this visit via video technology but the patient was not able to do a video connection so the visit was completed via audio telephone only  Verification of patient location:  Patient is located in the following state in which I hold an active license: [place]    Subjective:   Fabrice Melgar is a 63 y o  female who is concerned about COVID-19  Patient's symptoms include fever, chills, fatigue, malaise, rhinorrhea, sore throat, cough, shortness of breath, chest tightness, myalgias and headache  Patient denies congestion, abdominal pain, nausea, vomiting and diarrhea       - Date of symptom onset: 5/17/2022  - Date of exposure: 5/16/2022      COVID-19 vaccination status: Fully vaccinated (primary series)    Exposure:   Contact with a person who is under investigation (PUI) for or who is positive for COVID-19 within the last 14 days?: Yes    Hospitalized recently for fever and/or lower respiratory symptoms?: No      Currently a healthcare worker that is involved in direct patient care?: No      Works in a special setting where the risk of COVID-19 transmission may be high? (this may include long-term care, correctional and FPC facilities; homeless shelters; assisted-living facilities and group homes ): No      Resident in a special setting where the risk of COVID-19 transmission may be high? (this may include long-term care, correctional and FPC facilities; homeless shelters; assisted-living facilities and group homes ): No      Lab Results   Component Value Date    SARSCOV2 Negative 11/23/2021    [address] [address] Not Detected 12/01/2020     Past Medical History:   Diagnosis Date    Hypertension      Past Surgical History:   Procedure Laterality Date    ABLATION COLPOCLESIS      TUBAL LIGATION       Current Outpatient Medications   Medication Sig Dispense Refill    betamethasone dipropionate (DIPROSONE) 0 05 % ointment Apply topically 2 (two) times a day 45 g 5    ketoconazole (NIZORAL) 2 % shampoo Apply 1 application topically 2 (two) times a week 120 mL 5    Multiple Vitamins-Minerals (MULTIVITAMIN WITH MINERALS) tablet Take 1 tablet by mouth daily      nirmatrelvir & ritonavir (Paxlovid) tablet therapy pack Take 2 tablets by mouth in the morning and 2 tablets in the evening  Do all this for 5 days  Take 1 nirmatrelvir tablet + 1 ritonavir tablet together per dose  20 tablet 0    phentermine 30 MG capsule Take 1 capsule (30 mg total) by mouth every morning 30 capsule 0    traZODone (DESYREL) 100 mg tablet Take 1 tablet (100 mg total) by mouth daily at bedtime 30 tablet 2     No current facility-administered medications for this visit  Allergies   Allergen Reactions    Other Itching and Rash     Dust, bugs        Review of Systems   Constitutional: Positive for activity change, appetite change, chills, diaphoresis, fatigue and fever  HENT: Positive for rhinorrhea and sore throat  Negative for congestion  Respiratory: Positive for cough, chest tightness and shortness of breath  Cardiovascular: Negative for chest pain and leg swelling  Gastrointestinal: Negative for abdominal pain, diarrhea, nausea and vomiting  Musculoskeletal: Positive for myalgias  Skin: Negative for rash  Neurological: Positive for headaches  Psychiatric/Behavioral: Negative for sleep disturbance  Objective: There were no vitals filed for this visit  VIRTUAL VISIT DISCLAIMER    Deepak Perez verbally agrees to participate in [employer]  Pt is aware that [employer] could be limited without vital signs or the ability to perform a full hands-on physical Ish Jose understands she or the provider may request at any time to terminate the video visit and request the patient to seek care or treatment in person

## 2022-05-18 NOTE — LETTER
May 18, 2022     Patient: Kary Spencer  YOB: 1960  Date of Visit: 5/18/2022      To Whom it May Concern:    Kary Spencer is under my professional care  Ibis was seen in my office on 5/18/2022  Ibis may return to work on 5/23/22 but will be required to mask with n95 or kn95 through 5/27/22  If you have any questions or concerns, please don't hesitate to call           Sincerely,          Lizzy Gomez PA-C        CC: No Recipients

## 2022-05-23 ENCOUNTER — TELEMEDICINE (OUTPATIENT)
Dept: INTERNAL MEDICINE CLINIC | Age: 62
End: 2022-05-23
Payer: COMMERCIAL

## 2022-05-23 VITALS — WEIGHT: 190 LBS | BODY MASS INDEX: 29.76 KG/M2

## 2022-05-23 DIAGNOSIS — F51.01 PRIMARY INSOMNIA: ICD-10-CM

## 2022-05-23 DIAGNOSIS — U07.1 COVID-19: Primary | ICD-10-CM

## 2022-05-23 PROCEDURE — 99213 OFFICE O/P EST LOW 20 MIN: CPT | Performed by: INTERNAL MEDICINE

## 2022-05-23 RX ORDER — BENZONATATE 200 MG/1
200 CAPSULE ORAL 3 TIMES DAILY PRN
Qty: 20 CAPSULE | Refills: 0 | Status: SHIPPED | OUTPATIENT
Start: 2022-05-23

## 2022-05-23 RX ORDER — TRAZODONE HYDROCHLORIDE 100 MG/1
100 TABLET ORAL
Qty: 30 TABLET | Refills: 2 | Status: SHIPPED | OUTPATIENT
Start: 2022-05-23

## 2022-05-23 NOTE — ASSESSMENT & PLAN NOTE
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

## 2022-05-23 NOTE — LETTER
May 23, 2022     Patient: Gregorio Vasques  YOB: 1960  Date of Visit: 5/23/2022      To Whom it May Concern:    Gregorio Vasques is under my professional care  Ibis was seen in my office on 5/23/2022  Ibis may return to work on 5/25/22  She may return on 5/25 unless she is still not feeling well  If you have any questions or concerns, please don't hesitate to call           Sincerely,          Annamaria Henderson PA-C        CC: No Recipients

## 2022-05-23 NOTE — PROGRESS NOTES
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  Should be fever free for 24 hours without medication before returning to work  Recommended repeat rapid antigen testing to return to work  Wear KN95 mask for an additional 5 days  Call if symptoms worsen               Other Visit Diagnoses     Primary insomnia        may increase trazodone to 100mg qhs          Disposition:     Discussed symptom directed medication options with patient  Tessalon pearle 200 mg po TID prn cough  Cont mucinex as needed  Increase fluids, rest, walk  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  Should be fever free for 24 hours without medication before returning to work  Recommended repeat rapid antigen testing to return to work  Wear KN95 mask for an additional 5 days  Call if symptoms worsen    I have spent 15 minutes directly with the patient  Greater than 50% of this time was spent in counseling/coordination of care regarding: instructions for management and patient and family education        Encounter provider Wei Elder PA-C    Provider located at [address] [address]    Recent Visits  Date Type Provider Dept   05/18/22 Telemedicine Ayesha Hatch PA-C [facility] recent visits within past 7 days and meeting all other requirements  Today's Visits  Date Type Provider Dept   05/23/22 Telemedicine Wei Elder PA-C [facility]   Showing today's visits and meeting all other requirements  Future Appointments  No visits were found meeting these conditions  Showing future appointments within next 150 days and meeting all other requirements     This virtual check-in was done via [employer] and patient was informed that this is a secure, HIPAA-compliant platform  She agrees to proceed  Patient agrees to participate in a virtual check in via telephone or video visit instead of presenting to the office to address urgent/immediate medical needs  Patient is aware this is a billable service  After connecting through [facility], the patient was identified by name and date of birth  Ashley Beard was informed that this was a telemedicine visit and that the exam was being conducted confidentially over secure lines  My office door was closed  No one else was in the room  Ashley Beard acknowledged consent and understanding of privacy and security of the telemedicine visit  I informed the patient that I have reviewed her record in Epic and presented the opportunity for her to ask any questions regarding the visit today  The patient agreed to participate  Verification of patient location:  Patient is located in the following state in which I hold an active license: [place]    Subjective:   Ashley Beard is a 63 y o  female who has been screened for COVID-19  Symptom change since last report: improving  Patient's symptoms include fever, fatigue, nasal congestion, rhinorrhea, cough, chest tightness, vomiting and myalgias  Patient denies chills, sore throat, anosmia, loss of taste, shortness of breath, abdominal pain, nausea, diarrhea and headaches  - Date of symptom onset: 5/17/2022  - Date of exposure: 5/16/2022      COVID-19 vaccination status: Fully vaccinated (primary series)    Ibis has been staying home and has isolated themselves in her home   She is taking care to not share personal items and is cleaning all surfaces that are touched often, like counters, tabletops, and doorknobs using household cleaning sprays or wipes  Woke up last night sweating but has not taken temperature  Cough is keeping her up at night and is what is bothering her the most   She has intermittent mild sputum production, "light yellow"  She has mild "tightness" when she is active but not resting SOB  She notes the SOB is much improved than in the beginning  Had one episode of vomiting yesterday  She has had diarrhea since starting paxlovid  Today is her last dose  HA resolved  Overall she feels better but is still bothered by cough  She does also note a bad taste in her mouth since starting Paxlovid "like I ate some money"     She uses trazadone to help her sleep  She does not take it every night  Lab Results   Component Value Date    SARSCOV2 Negative 11/23/2021    SARSCOV2 Not Detected 12/01/2020     Past Medical History:   Diagnosis Date    Hypertension      Past Surgical History:   Procedure Laterality Date    ABLATION COLPOCLESIS      TUBAL LIGATION       Current Outpatient Medications   Medication Sig Dispense Refill    betamethasone dipropionate (DIPROSONE) 0 05 % ointment Apply topically 2 (two) times a day 45 g 5    ketoconazole (NIZORAL) 2 % shampoo Apply 1 application topically 2 (two) times a week 120 mL 5    Multiple Vitamins-Minerals (MULTIVITAMIN WITH MINERALS) tablet Take 1 tablet by mouth daily      nirmatrelvir & ritonavir (Paxlovid) tablet therapy pack Take 2 tablets by mouth in the morning and 2 tablets in the evening  Do all this for 5 days  Take 1 nirmatrelvir tablet + 1 ritonavir tablet together per dose  20 tablet 0    phentermine 30 MG capsule Take 1 capsule (30 mg total) by mouth every morning 30 capsule 0    traZODone (DESYREL) 100 mg tablet Take 1 tablet (100 mg total) by mouth daily at bedtime 30 tablet 2     No current facility-administered medications for this visit  Allergies   Allergen Reactions    Other Itching and Rash     Dust, bugs        Review of Systems   Constitutional: Positive for fatigue and fever  Negative for chills  HENT: Positive for congestion and rhinorrhea  Negative for sore throat  Eyes: Negative for visual disturbance  Respiratory: Positive for cough and chest tightness  Negative for shortness of breath  Cardiovascular: Negative for chest pain, palpitations and leg swelling  Gastrointestinal: Positive for vomiting  Negative for abdominal pain, constipation, diarrhea and nausea  Musculoskeletal: Positive for myalgias  Neurological: Negative for dizziness and headaches  Psychiatric/Behavioral: Positive for sleep disturbance  Negative for dysphoric mood  The patient is not nervous/anxious  Objective:    Vitals:    05/23/22 0851   Weight: 86 2 kg (190 lb)       Physical Exam    VIRTUAL VISIT DISCLAIMER    Ibis Fuentes verbally agrees to participate in [employer]  Pt is aware that [employer] could be limited without vital signs or the ability to perform a full hands-on physical Perry Allis understands she or the provider may request at any time to terminate the video visit and request the patient to seek care or treatment in person

## 2022-06-15 ENCOUNTER — HOSPITAL ENCOUNTER (OUTPATIENT)
Dept: RADIOLOGY | Age: 62
Discharge: HOME | End: 2022-06-15
Payer: COMMERCIAL

## 2022-06-15 VITALS — HEIGHT: 67 IN | WEIGHT: 190 LBS | BODY MASS INDEX: 29.82 KG/M2

## 2022-06-15 DIAGNOSIS — Z12.31 ENCOUNTER FOR SCREENING MAMMOGRAM FOR MALIGNANT NEOPLASM OF BREAST: ICD-10-CM

## 2022-06-15 PROCEDURE — 77067 SCR MAMMO BI INCL CAD: CPT

## 2022-06-15 PROCEDURE — 77063 BREAST TOMOSYNTHESIS BI: CPT

## 2022-08-15 DIAGNOSIS — F51.01 PRIMARY INSOMNIA: ICD-10-CM

## 2022-08-15 RX ORDER — TRAZODONE HYDROCHLORIDE 100 MG/1
100 TABLET ORAL
Qty: 30 TABLET | Refills: 2 | Status: SHIPPED | OUTPATIENT
Start: 2022-08-15

## 2022-08-15 NOTE — TELEPHONE ENCOUNTER
Refill - patient does not have any more   LA:  2-24-22   NA: 8-24-22     Please let patient know when prescription goes to the pharmacy

## 2022-08-17 ENCOUNTER — APPOINTMENT (OUTPATIENT)
Dept: LAB | Facility: MEDICAL CENTER | Age: 62
End: 2022-08-17

## 2022-08-17 DIAGNOSIS — Z00.8 HEALTH EXAMINATION IN POPULATION SURVEY: ICD-10-CM

## 2022-08-17 LAB
CHOLEST SERPL-MCNC: 196 MG/DL
HDLC SERPL-MCNC: 73 MG/DL
LDLC SERPL CALC-MCNC: 74 MG/DL (ref 0–100)
NONHDLC SERPL-MCNC: 123 MG/DL
TRIGL SERPL-MCNC: 245 MG/DL

## 2022-08-17 PROCEDURE — 80061 LIPID PANEL: CPT

## 2022-08-17 PROCEDURE — 36415 COLL VENOUS BLD VENIPUNCTURE: CPT

## 2022-08-17 PROCEDURE — 83036 HEMOGLOBIN GLYCOSYLATED A1C: CPT

## 2022-08-18 LAB
EST. AVERAGE GLUCOSE BLD GHB EST-MCNC: 117 MG/DL
HBA1C MFR BLD: 5.7 %

## 2022-08-24 ENCOUNTER — OFFICE VISIT (OUTPATIENT)
Dept: INTERNAL MEDICINE CLINIC | Age: 62
End: 2022-08-24
Payer: COMMERCIAL

## 2022-08-24 VITALS
WEIGHT: 206 LBS | TEMPERATURE: 97.9 F | HEIGHT: 67 IN | DIASTOLIC BLOOD PRESSURE: 86 MMHG | OXYGEN SATURATION: 98 % | HEART RATE: 66 BPM | BODY MASS INDEX: 32.33 KG/M2 | SYSTOLIC BLOOD PRESSURE: 103 MMHG

## 2022-08-24 DIAGNOSIS — G25.81 RLS (RESTLESS LEGS SYNDROME): ICD-10-CM

## 2022-08-24 DIAGNOSIS — Z12.11 ENCOUNTER FOR SCREENING FOR MALIGNANT NEOPLASM OF COLON: Primary | ICD-10-CM

## 2022-08-24 DIAGNOSIS — Z13.228 ENCOUNTER FOR SCREENING FOR METABOLIC DISORDER: ICD-10-CM

## 2022-08-24 DIAGNOSIS — R53.83 FATIGUE, UNSPECIFIED TYPE: ICD-10-CM

## 2022-08-24 DIAGNOSIS — Z00.00 ANNUAL PHYSICAL EXAM: ICD-10-CM

## 2022-08-24 PROCEDURE — 99396 PREV VISIT EST AGE 40-64: CPT | Performed by: PHYSICIAN ASSISTANT

## 2022-08-24 RX ORDER — ROPINIROLE 1 MG/1
1 TABLET, FILM COATED ORAL
Qty: 30 TABLET | Refills: 1 | Status: SHIPPED | OUTPATIENT
Start: 2022-08-24 | End: 2022-09-15

## 2022-08-24 NOTE — PROGRESS NOTES
Assessment and Plan:     Problem List Items Addressed This Visit    None     Visit Diagnoses     Encounter for screening for malignant neoplasm of colon    -  Primary    Relevant Orders    Cologuard    RLS (restless legs syndrome)        Relevant Medications    rOPINIRole (REQUIP) 1 mg tablet    Encounter for screening for metabolic disorder        Relevant Orders    CBC and differential    Comprehensive metabolic panel    TSH, 3rd generation    Fatigue, unspecified type        Relevant Orders    CBC and differential    Comprehensive metabolic panel    TSH, 3rd generation      discussed SE requip, trial low dose qhs  Hold trazodone when using this for now  F/u in 1 week, send message regarding this      Preventive health issues were discussed with patient, and age appropriate screening tests were ordered as noted in patient's After Visit Summary  Personalized health advice and appropriate referrals for health education or preventive services given if needed, as noted in patient's After Visit Summary  History of Present Illness:     Patient presents for comprehensive PE     64 y/o with hx of anxiety d/o, presents for PE    Pt reports she has been feeling well  Pt working at [employer] and states she is very active with work but wants to exercise more  Pt states her legs are always moving and shaking during the day  Pt reports RLS sx, anxious overnight      Patient Care Team:  Maira Kohler PA-C as PCP - General (Physician Assistant)     Review of Systems:     Review of Systems   Constitutional: Negative for activity change, appetite change, chills, diaphoresis, fatigue and fever  HENT: Negative for congestion and sore throat  Eyes: Negative for pain  Respiratory: Negative for cough and shortness of breath  Cardiovascular: Negative for chest pain and leg swelling  Gastrointestinal: Negative for abdominal pain, constipation, diarrhea and nausea  Genitourinary: Negative for frequency  Musculoskeletal: Positive for arthralgias  Negative for gait problem  Skin: Negative for rash  Neurological: Negative for dizziness, light-headedness and headaches  Hematological: Negative for adenopathy  Psychiatric/Behavioral: Positive for sleep disturbance  The patient is nervous/anxious  Problem List:     Patient Active Problem List   Diagnosis    COVID-19    Benign essential hypertension    Gastroenteritis    Bursitis of left knee    Acute pain of left knee    Anxiety state    Leiomyoma of uterus      Past Medical and Surgical History:     Past Medical History:   Diagnosis Date    Hypertension      Past Surgical History:   Procedure Laterality Date    ABLATION COLPOCLESIS      TUBAL LIGATION        Family History:     Family History   Problem Relation Age of Onset    No Known Problems Mother     Diabetes Father     Glaucoma Father     No Known Problems Sister     No Known Problems Daughter     Breast cancer Maternal Grandmother         unknown age   [facility] No Known Problems Maternal Grandfather     Breast cancer Paternal Grandmother         unknown age   [facility] No Known Problems Paternal Grandfather     No Known Problems Son     No Known Problems Sister     No Known Problems Paternal Aunt       Social History:     Social History     Socioeconomic History    Marital status:      Spouse name: None    Number of children: None    Years of education: None    Highest education level: None   Occupational History    None   Tobacco Use    Smoking status: Never Smoker    Smokeless tobacco: Never Used   Vaping Use    Vaping Use: Never used   Substance and Sexual Activity    Alcohol use:  Yes     Alcohol/week: 1 0 standard drink     Types: 1 Cans of beer per week     Comment: social    Drug use: Never    Sexual activity: Yes   Other Topics Concern    None   Social History Narrative    None     Social Determinants of Health     Financial Resource Strain: Not on file   Food Insecurity: Not on file   Transportation Needs: Not on file   Physical Activity: Not on file   Stress: Not on file   Social Connections: Not on file   Intimate Partner Violence: Not on file   Housing Stability: Not on file      Medications and Allergies:     Current Outpatient Medications   Medication Sig Dispense Refill    betamethasone dipropionate (DIPROSONE) 0 05 % ointment Apply topically 2 (two) times a day (Patient taking differently: Apply topically 2 (two) times a day as needed) 45 g 5    ketoconazole (NIZORAL) 2 % shampoo Apply 1 application topically 2 (two) times a week 120 mL 5    Multiple Vitamins-Minerals (MULTIVITAMIN WITH MINERALS) tablet Take 1 tablet by mouth daily      rOPINIRole (REQUIP) 1 mg tablet Take 1 tablet (1 mg total) by mouth daily at bedtime 30 tablet 1    traZODone (DESYREL) 100 mg tablet Take 1 tablet (100 mg total) by mouth daily at bedtime 30 tablet 2     No current facility-administered medications for this visit       Allergies   Allergen Reactions    Other Itching and Rash     Dust, bugs       Immunizations:     Immunization History   Administered Date(s) Administered    COVID-19 MODERNA VACC 0 5 ML IM 01/02/2021, 01/30/2021    INFLUENZA 01/01/2005, 12/04/2014, 11/01/2015, 11/01/2015, 11/01/2020    Influenza, recombinant, quadrivalent,injectable, preservative free 10/21/2021    Td (adult), Unspecified 01/03/1997      Health Maintenance:         Topic Date Due    HIV Screening  Never done    Colorectal Cancer Screening  Never done    Cervical Cancer Screening  08/01/2022    Breast Cancer Screening: Mammogram  06/15/2023    Hepatitis C Screening  Completed         Topic Date Due    DTaP,Tdap,and Td Vaccines (1 - Tdap) 01/04/1997    COVID-19 Vaccine (3 - Booster for Moderna series) 06/30/2021    Influenza Vaccine (1) 09/01/2022        Physical Exam:     /86 (BP Location: Left arm, Patient Position: Sitting, Cuff Size: Adult)   Pulse 66   Temp 97 9 °F (36 6 °C) (Temporal)   Ht 5' 7" (1 702 m)   Wt 93 4 kg (206 lb)   LMP  (LMP Unknown)   SpO2 98% Comment: RA  BMI 32 26 kg/m²     Physical Exam  Vitals reviewed  Constitutional:       General: She is not in acute distress  HENT:      Head: Normocephalic and atraumatic  Right Ear: Tympanic membrane, ear canal and external ear normal  There is no impacted cerumen  Left Ear: Tympanic membrane, ear canal and external ear normal  There is no impacted cerumen  Eyes:      General:         Right eye: No discharge  Left eye: No discharge  Extraocular Movements: Extraocular movements intact  Conjunctiva/sclera: Conjunctivae normal       Pupils: Pupils are equal, round, and reactive to light  Cardiovascular:      Rate and Rhythm: Normal rate and regular rhythm  Heart sounds: No murmur heard  Pulmonary:      Effort: Pulmonary effort is normal  No respiratory distress  Breath sounds: Normal breath sounds  No wheezing or rales  Abdominal:      General: Abdomen is flat  There is no distension  Musculoskeletal:         General: No deformity  Normal range of motion  Cervical back: Normal range of motion  Right lower leg: No edema  Left lower leg: No edema  Lymphadenopathy:      Cervical: No cervical adenopathy  Skin:     General: Skin is warm  Findings: No erythema or rash  Neurological:      General: No focal deficit present  Mental Status: She is alert and oriented to person, place, and time  Cranial Nerves: No cranial nerve deficit  Motor: No weakness        Gait: Gait normal    Psychiatric:         Mood and Affect: Mood normal          Behavior: Behavior normal           Nicho Mcclure PA-C

## 2022-09-15 ENCOUNTER — TELEPHONE (OUTPATIENT)
Dept: INTERNAL MEDICINE CLINIC | Age: 62
End: 2022-09-15

## 2022-09-15 DIAGNOSIS — G25.81 RLS (RESTLESS LEGS SYNDROME): ICD-10-CM

## 2022-09-15 RX ORDER — ROPINIROLE 1 MG/1
2 TABLET, FILM COATED ORAL
Qty: 30 TABLET | Refills: 1
Start: 2022-09-15 | End: 2023-05-03

## 2022-09-15 NOTE — TELEPHONE ENCOUNTER
Pt called the office and stated that you prescribed her something for restless leg syndrome and was to call if its not working  She states it is not helping with it at all  Please advise what she is to do or if there is something else she can take

## 2022-09-21 ENCOUNTER — OFFICE VISIT (OUTPATIENT)
Dept: OBGYN CLINIC | Facility: CLINIC | Age: 62
End: 2022-09-21
Payer: COMMERCIAL

## 2022-09-21 VITALS — WEIGHT: 210 LBS | OXYGEN SATURATION: 100 % | HEIGHT: 67 IN | BODY MASS INDEX: 32.96 KG/M2 | HEART RATE: 61 BPM

## 2022-09-21 DIAGNOSIS — M25.562 LEFT KNEE PAIN, UNSPECIFIED CHRONICITY: Primary | ICD-10-CM

## 2022-09-21 DIAGNOSIS — M17.12 PRIMARY OSTEOARTHRITIS OF LEFT KNEE: ICD-10-CM

## 2022-09-21 PROCEDURE — 99213 OFFICE O/P EST LOW 20 MIN: CPT | Performed by: PHYSICIAN ASSISTANT

## 2022-09-21 PROCEDURE — 20610 DRAIN/INJ JOINT/BURSA W/O US: CPT | Performed by: PHYSICIAN ASSISTANT

## 2022-09-21 RX ORDER — TRIAMCINOLONE ACETONIDE 40 MG/ML
80 INJECTION, SUSPENSION INTRA-ARTICULAR; INTRAMUSCULAR
Status: COMPLETED | OUTPATIENT
Start: 2022-09-21 | End: 2022-09-21

## 2022-09-21 RX ORDER — LIDOCAINE HYDROCHLORIDE 10 MG/ML
2 INJECTION, SOLUTION INFILTRATION; PERINEURAL
Status: COMPLETED | OUTPATIENT
Start: 2022-09-21 | End: 2022-09-21

## 2022-09-21 RX ORDER — BUPIVACAINE HYDROCHLORIDE 5 MG/ML
2 INJECTION, SOLUTION EPIDURAL; INTRACAUDAL
Status: COMPLETED | OUTPATIENT
Start: 2022-09-21 | End: 2022-09-21

## 2022-09-21 RX ADMIN — BUPIVACAINE HYDROCHLORIDE 2 ML: 5 INJECTION, SOLUTION EPIDURAL; INTRACAUDAL at 18:40

## 2022-09-21 RX ADMIN — LIDOCAINE HYDROCHLORIDE 2 ML: 10 INJECTION, SOLUTION INFILTRATION; PERINEURAL at 18:40

## 2022-09-21 RX ADMIN — TRIAMCINOLONE ACETONIDE 80 MG: 40 INJECTION, SUSPENSION INTRA-ARTICULAR; INTRAMUSCULAR at 18:40

## 2022-09-21 NOTE — PROGRESS NOTES
Assessment/Plan   Diagnoses and all orders for this visit:    Left knee pain, unspecified chronicity    Primary osteoarthritis of left knee      - Cortisone injection today  - Ice as needed  - Follow up with Dr Gemma Mcardle in 2mo to consider TKA      Subjective   Patient ID: Gela Khan is a 63 y o  female  Vitals:    09/21/22 1816   Pulse: 61   SpO2: 100%     65yo female comes in for an evaluation of her left knee  She is a current patient of Dr Lebron Guzmán osteoarthritis  Her last cortisone injection was in November  She is going on a trip to [facility] soon and would like another injection before she leaves  No new injury  The pain is dull in character, mild in severity, pain does not radiate and is not associated with numbness  The following portions of the patient's history were reviewed and updated as appropriate: allergies, current medications, past family history, past medical history, past social history, past surgical history and problem list     Review of Systems  Ortho Exam  Past Medical History:   Diagnosis Date    Hypertension      Past Surgical History:   Procedure Laterality Date    ABLATION COLPOCLESIS      TUBAL LIGATION       Family History   Problem Relation Age of Onset    No Known Problems Mother     Diabetes Father     Glaucoma Father     No Known Problems Sister     No Known Problems Daughter     Breast cancer Maternal Grandmother         unknown age   [facility] No Known Problems Maternal Grandfather     Breast cancer Paternal Grandmother         unknown age   Gricelda  No Known Problems Paternal Grandfather     No Known Problems Son     No Known Problems Sister     No Known Problems Paternal Aunt      Social History     Occupational History    Not on file   Tobacco Use    Smoking status: Never Smoker    Smokeless tobacco: Never Used   Vaping Use    Vaping Use: Never used   Substance and Sexual Activity    Alcohol use:  Yes     Alcohol/week: 1 0 standard drink     Types: 1 Cans of beer per week     Comment: social    Drug use: Never    Sexual activity: Yes       Review of Systems   Constitutional: Negative  HENT: Negative  Eyes: Negative  Respiratory: Negative  Cardiovascular: Negative  Gastrointestinal: Negative  Endocrine: Negative  Genitourinary: Negative  Musculoskeletal: As below      Allergic/Immunologic: Negative  Neurological: Negative  Hematological: Negative  Psychiatric/Behavioral: Negative  Objective   Physical Exam    · Constitutional: Awake, Alert, Oriented  · Eyes: EOMI  · Psych: Mood and affect appropriate  · Heart: regular rate   · Lungs: No audible wheezing  · Abdomen: No guarding  · Lymph: no lymphedema   left Knee:  - Appearance   No swelling, discoloration, deformity, or ecchymosis  - Effusion   trace  - Palpation   + Tenderness medial and lateral joint lines  No tenderness of the patella, patellar tendon, pes anserine, lateral joint line, MCL, LCL, medial / lateral plateau  - ROM   Extension: 0 and Flexion: 130  - Special Tests   Eulalia's Test negative, Lachman's Test negative, Anterior Drawer Test negative, Posterior Drawer Test negative, Valgus Stress Test negative, Varus Stress Test negative and Patellar apprehension negative  - Motor   normal 5/5 in all planes  - NVI distally    I have personally reviewed pertinent films in PACS and my interpretation is Medial joint space narrowing  Patellar osteophytes  Large joint arthrocentesis: L knee  Universal Protocol:  Consent: Verbal consent obtained  Risks and benefits: risks, benefits and alternatives were discussed  Consent given by: patient  Time out: Immediately prior to procedure a "time out" was called to verify the correct patient, procedure, equipment, support staff and site/side marked as required    Timeout called at: 9/21/2022 6:40 PM   Patient understanding: patient states understanding of the procedure being performed  Site marked: the operative site was marked  Radiology Images displayed and confirmed   If images not available, report reviewed: imaging studies available  Patient identity confirmed: verbally with patient    Supporting Documentation  Indications: pain   Procedure Details  Location: knee - L knee  Needle size: 22 G  Ultrasound guidance: no  Approach: lateral  Medications administered: 2 mL bupivacaine (PF) 0 5 %; 2 mL lidocaine 1 %; 80 mg triamcinolone acetonide 40 mg/mL    Patient tolerance: patient tolerated the procedure well with no immediate complications  Dressing:  Sterile dressing applied

## 2022-10-01 NOTE — TELEPHONE ENCOUNTER
LMOM for pt  To call back to discuss completing cologuard before next appt  Please have pt  Talk to MA to discuss completing Monday-Thurs and may drop off for shipping if pt  Would like in any 3 offices 
cologuard is not covered by insurance  LMOM for pt  To call back to complete Fit test instead of cologuard 
yes

## 2022-10-12 PROBLEM — K52.9 GASTROENTERITIS: Status: RESOLVED | Noted: 2021-01-25 | Resolved: 2022-10-12

## 2022-11-15 DIAGNOSIS — F51.01 PRIMARY INSOMNIA: ICD-10-CM

## 2022-11-15 RX ORDER — TRAZODONE HYDROCHLORIDE 100 MG/1
100 TABLET ORAL
Qty: 90 TABLET | Refills: 0 | Status: SHIPPED | OUTPATIENT
Start: 2022-11-15

## 2022-11-28 ENCOUNTER — TELEMEDICINE (OUTPATIENT)
Dept: INTERNAL MEDICINE CLINIC | Facility: OTHER | Age: 62
End: 2022-11-28

## 2022-11-28 VITALS — WEIGHT: 200 LBS | BODY MASS INDEX: 28.63 KG/M2 | HEIGHT: 70 IN

## 2022-11-28 DIAGNOSIS — U07.1 COVID-19: Primary | ICD-10-CM

## 2022-11-28 DIAGNOSIS — Z12.4 CERVICAL CANCER SCREENING: ICD-10-CM

## 2022-11-28 RX ORDER — DEXTROMETHORPHAN HYDROBROMIDE AND PROMETHAZINE HYDROCHLORIDE 15; 6.25 MG/5ML; MG/5ML
5 SOLUTION ORAL 4 TIMES DAILY PRN
Qty: 118 ML | Refills: 0 | Status: SHIPPED | OUTPATIENT
Start: 2022-11-28

## 2022-11-28 NOTE — PROGRESS NOTES
COVID-19 Outpatient Progress Note    Assessment/Plan:    Problem List Items Addressed This Visit        Other    COVID-19 - Primary     Will prescribe Mulnupiravir, reviewed EUA fact sheet with patient and discussed potential adverse effects  Paxlovid caused her diarrhea in the past   Discussed over-the-counter medications to take for symptom relief  She is to contact our office for worsening symptoms  Relevant Medications    molnupiravir 200 mg capsule    Promethazine-DM (PHENERGAN-DM) 6 25-15 mg/5 mL oral syrup   Other Visit Diagnoses     Cervical cancer screening             Disposition:     Patient has asymptomatic or mild COVID-19 infection  Based off CDC guidelines, they were recommended to isolate for 5 days  If they are asymptomatic or symptoms are improving with no fevers in the past 24 hours, isolation may be ended followed by 5 days of wearing a mask when around othes to minimize risk of infecting others  If still have a fever or other symptoms have not improved, continue to isolate until they improve  Regardless of when they end isolation, avoid being around people who are more likely to get very sick from COVID-19 until at least day 11  I have spent 15 minutes directly with the patient  Greater than 50% of this time was spent in counseling/coordination of care regarding: prognosis, risks and benefits of treatment options, instructions for management, patient and family education, importance of treatment compliance, risk factor reductions and impressions  Encounter provider: Maribel Martinez MD     Provider located at: [address] [place] [address]     Recent Visits  No visits were found meeting these conditions    Showing recent visits within past 7 days and meeting all other requirements  Today's Visits  Date Type Provider Dept   11/28/22 Telemedicine Maribel Martinez MD [facility]   Showing today's visits and meeting all other requirements  Future Appointments  No visits were found meeting these conditions  Showing future appointments within next 150 days and meeting all other requirements     This virtual check-in was done via [address] and patient was informed that this is a secure, HIPAA-compliant platform  She agrees to proceed  Patient agrees to participate in a virtual check in via telephone or video visit instead of presenting to the office to address urgent/immediate medical needs  Patient is aware this is a billable service  She acknowledged consent and understanding of privacy and security of the video platform  The patient has agreed to participate and understands they can discontinue the visit at any time  After connecting through [facility], the patient was identified by name and date of birth  Eleuterio Lennox was informed that this was a telemedicine visit and that the exam was being conducted confidentially over secure lines  My office door was closed  No one else was in the room  Eleuterio Lennox acknowledged consent and understanding of privacy and security of the telemedicine visit  I informed the patient that I have reviewed her record in Epic and presented the opportunity for her to ask any questions regarding the visit today  The patient agreed to participate  Verification of patient location:  Patient is located in the following state in which I hold an active license: [place]    Subjective:   Eleuterio Lennox is a 63 y o  female who has been screened for COVID-19  Symptom change since last report: unchanged  Patient's symptoms include chills, fatigue, malaise, nasal congestion, cough, myalgias and headache  Patient denies fever, rhinorrhea, sore throat, shortness of breath, chest tightness, abdominal pain, nausea, vomiting and diarrhea  - Date of symptom onset: 11/26/2022  - Date of positive COVID-19 test: 11/27/2022  Type of test: Home antigen  COVID-19 vaccination status: Fully vaccinated (primary series)    Ibis has been staying home and has isolated themselves in her home  She is taking care to not share personal items and is cleaning all surfaces that are touched often, like counters, tabletops, and doorknobs using household cleaning sprays or wipes  She is wearing a mask when she leaves her room  63year old female is seen today after testing positive for COVID-19  She has been taking Mucinex for her symptoms which provides minimal relief  Lab Results   Component Value Date    SARSCOV2 Negative 11/23/2021    SARSCOV2 Not Detected 12/01/2020       Review of Systems   Constitutional: Positive for chills and fatigue  Negative for activity change, appetite change, diaphoresis and fever  HENT: Positive for congestion  Negative for postnasal drip, rhinorrhea, sinus pressure, sinus pain, sneezing and sore throat  Eyes: Negative for visual disturbance  Respiratory: Positive for cough  Negative for apnea, choking, chest tightness, shortness of breath and wheezing  Cardiovascular: Negative for chest pain, palpitations and leg swelling  Gastrointestinal: Negative for abdominal distention, abdominal pain, anal bleeding, blood in stool, constipation, diarrhea, nausea and vomiting  Endocrine: Negative for cold intolerance and heat intolerance  Genitourinary: Negative for difficulty urinating, dysuria and hematuria  Musculoskeletal: Positive for myalgias  Skin: Negative  Neurological: Positive for headaches  Negative for dizziness, weakness, light-headedness and numbness  Hematological: Negative for adenopathy  Psychiatric/Behavioral: Negative for agitation, sleep disturbance and suicidal ideas  All other systems reviewed and are negative      Current Outpatient Medications on File Prior to Visit   Medication Sig   • betamethasone dipropionate (DIPROSONE) 0 05 % ointment Apply topically 2 (two) times a day (Patient taking differently: Apply topically 2 (two) times a day as needed)   • ketoconazole (NIZORAL) 2 % shampoo Apply 1 application topically 2 (two) times a week   • Multiple Vitamins-Minerals (MULTIVITAMIN WITH MINERALS) tablet Take 1 tablet by mouth daily   • rOPINIRole (REQUIP) 1 mg tablet Take 2 tablets (2 mg total) by mouth daily at bedtime (Patient taking differently: Take 2 mg by mouth daily at bedtime Takes PRN)   • traZODone (DESYREL) 100 mg tablet Take 1 tablet (100 mg total) by mouth daily at bedtime       Objective:    Ht 5' 10" (1 778 m)   Wt 90 7 kg (200 lb)   LMP  (LMP Unknown)   BMI 28 70 kg/m²      Physical Exam  Vitals and nursing note reviewed  Constitutional:       General: She is not in acute distress  Appearance: Normal appearance  She is not ill-appearing or toxic-appearing  HENT:      Head: Normocephalic and atraumatic  Mouth/Throat:      Mouth: Mucous membranes are moist       Pharynx: Oropharynx is clear  No oropharyngeal exudate or posterior oropharyngeal erythema  Eyes:      General: No scleral icterus  Extraocular Movements: Extraocular movements intact  Conjunctiva/sclera: Conjunctivae normal    Pulmonary:      Effort: Pulmonary effort is normal  No respiratory distress  Abdominal:      General: Abdomen is flat  Tenderness: There is no abdominal tenderness  Musculoskeletal:         General: No swelling, deformity or signs of injury  Normal range of motion  Cervical back: Normal range of motion  Right lower leg: No edema  Left lower leg: No edema  Skin:     General: Skin is warm and dry  Coloration: Skin is not jaundiced or pale  Findings: No bruising, erythema, lesion or rash  Neurological:      General: No focal deficit present  Mental Status: She is alert and oriented to person, place, and time  Mental status is at baseline  Cranial Nerves: No cranial nerve deficit        Sensory: No sensory deficit  Psychiatric:         Mood and Affect: Mood normal          Behavior: Behavior normal          Thought Content:  Thought content normal          Judgment: Judgment normal        Jasen Dickinson MD

## 2022-11-28 NOTE — LETTER
[facility] [address] [address]  Dept: [phone]    November 28, 2022    Patient: Eleuterio Lennox  YOB: 1960    Eleuterio Lennox was seen and evaluated at our [facility]  She tested positive for Covid  She may return to work on 12/02/2022, as this is 5 days from the onset of symptoms  Upon return, they must then adhere to strict masking for an additional 5 days      Sincerely,    Jermain Partida MD

## 2022-11-28 NOTE — ASSESSMENT & PLAN NOTE
Will prescribe Mulnupiravir, reviewed EUA fact sheet with patient and discussed potential adverse effects  Paxlovid caused her diarrhea in the past   Discussed over-the-counter medications to take for symptom relief  She is to contact our office for worsening symptoms

## 2022-12-01 ENCOUNTER — TELEPHONE (OUTPATIENT)
Dept: INTERNAL MEDICINE CLINIC | Age: 62
End: 2022-12-01

## 2022-12-01 NOTE — TELEPHONE ENCOUNTER
See my note below  Please inform patient  I recommend she schedule appointment for further evaluation if her symptoms continue to worsen

## 2022-12-01 NOTE — LETTER
December 1, 2022     Patient: Mame Kearney  YOB: 1960  Date of Visit: 12/1/2022      To Whom it May Concern:    Mame Kearney is under my professional care  Ibis may return to work on 12/07/2022       If you have any questions or concerns, please don't hesitate to call           Sincerely,          Sera Gonzalez MD

## 2022-12-01 NOTE — TELEPHONE ENCOUNTER
Pt called the office and stated that she is not feeling any better since beginning the medication that was prescribed the other day  She said she can't take the cough medicine at work because it makes her sleepy and she is due to go back to work tomorrow  Please advise on what she should do/take  Thank you

## 2022-12-01 NOTE — TELEPHONE ENCOUNTER
Pt aware to continue with medications, I let her know about her work note being extended and advised her to f/u in office Monday or Tuesday if not improving

## 2022-12-01 NOTE — TELEPHONE ENCOUNTER
If she has not had any improvement of her symptoms, she should remain in quarantine for a full 10 days  I would like for her to continue the Molnupiravir and cough medicine  I will extend her work note

## 2022-12-02 NOTE — TELEPHONE ENCOUNTER
Will defer on ordering a chest x-ray at this time unless she has progressive worsening symptoms to which I recommend she go to an urgent care or the emergency department to undergo physical examination prior to undergoing imaging studies

## 2022-12-14 ENCOUNTER — IMMUNIZATIONS (OUTPATIENT)
Dept: INTERNAL MEDICINE CLINIC | Age: 62
End: 2022-12-14

## 2022-12-14 DIAGNOSIS — Z23 NEED FOR INFLUENZA VACCINATION: Primary | ICD-10-CM

## 2022-12-20 ENCOUNTER — TELEPHONE (OUTPATIENT)
Dept: INTERNAL MEDICINE CLINIC | Age: 62
End: 2022-12-20

## 2022-12-20 NOTE — TELEPHONE ENCOUNTER
Pt called for refill on her diet pills, she was unsure of the name  I did not find in the med list  She says they were prescribed by Annamarie Ha   Pt would like call back

## 2022-12-20 NOTE — TELEPHONE ENCOUNTER
Patient previously on phentermine 30 MG capsule  Please refill if appropriate  Medication was D/C'd on 8/24/22 do to patient not taking  Please advise, thank you

## 2022-12-22 ENCOUNTER — OFFICE VISIT (OUTPATIENT)
Dept: INTERNAL MEDICINE CLINIC | Age: 62
End: 2022-12-22

## 2022-12-22 VITALS
TEMPERATURE: 97.4 F | OXYGEN SATURATION: 96 % | HEART RATE: 81 BPM | HEIGHT: 70 IN | SYSTOLIC BLOOD PRESSURE: 124 MMHG | WEIGHT: 221 LBS | DIASTOLIC BLOOD PRESSURE: 68 MMHG | BODY MASS INDEX: 31.64 KG/M2

## 2022-12-22 DIAGNOSIS — J01.10 ACUTE NON-RECURRENT FRONTAL SINUSITIS: Primary | ICD-10-CM

## 2022-12-22 DIAGNOSIS — F51.01 PRIMARY INSOMNIA: ICD-10-CM

## 2022-12-22 DIAGNOSIS — E66.9 OBESITY (BMI 30.0-34.9): ICD-10-CM

## 2022-12-22 DIAGNOSIS — J20.8 ACUTE BRONCHITIS, VIRAL: ICD-10-CM

## 2022-12-22 RX ORDER — AZITHROMYCIN 250 MG/1
TABLET, FILM COATED ORAL
Qty: 6 TABLET | Refills: 0 | Status: SHIPPED | OUTPATIENT
Start: 2022-12-22 | End: 2022-12-27

## 2022-12-22 RX ORDER — PHENTERMINE HYDROCHLORIDE 30 MG/1
30 CAPSULE ORAL EVERY MORNING
Qty: 30 CAPSULE | Refills: 1 | Status: SHIPPED | OUTPATIENT
Start: 2022-12-22 | End: 2022-12-30

## 2022-12-22 RX ORDER — ALBUTEROL SULFATE 90 UG/1
2 AEROSOL, METERED RESPIRATORY (INHALATION) EVERY 6 HOURS PRN
Qty: 18 G | Refills: 0 | Status: SHIPPED | OUTPATIENT
Start: 2022-12-22

## 2022-12-22 RX ORDER — TRAZODONE HYDROCHLORIDE 100 MG/1
100 TABLET ORAL
Qty: 90 TABLET | Refills: 0 | Status: SHIPPED | OUTPATIENT
Start: 2022-12-22

## 2022-12-22 NOTE — PROGRESS NOTES
Assessment/Plan:         Diagnoses and all orders for this visit:    Acute non-recurrent frontal sinusitis  Comments:  zpak, with food  f/u in 7 days if not resolved  Orders:  -     azithromycin (Zithromax) 250 mg tablet; Take 2 tablets (500 mg total) by mouth daily for 1 day, THEN 1 tablet (250 mg total) daily for 4 days  Primary insomnia  Comments:   trazodone to 100mg qhs   Orders:  -     traZODone (DESYREL) 100 mg tablet; Take 1 tablet (100 mg total) by mouth daily at bedtime    Obesity (BMI 30 0-34  9)  Comments:  will resume short course phentermine  refer to weight management   Orders:  -     Ambulatory Referral to Weight Management; Future  -     phentermine 30 MG capsule; Take 1 capsule (30 mg total) by mouth every morning    Acute bronchitis, viral  Comments:  +proair prn  Orders:  -     albuterol (ProAir HFA) 90 mcg/act inhaler; Inhale 2 puffs every 6 (six) hours as needed for wheezing        BMI Counseling: Body mass index is 31 71 kg/m²  The BMI is above normal  Nutrition recommendations include encouraging healthy choices of fruits and vegetables, consuming healthier snacks, limiting drinks that contain sugar and increasing intake of lean protein  Exercise recommendations include exercising 3-5 times per week  Rationale for BMI follow-up plan is due to patient being overweight or obese  Depression Screening and Follow-up Plan: Patient was screened for depression during today's encounter  They screened negative with a PHQ-2 score of 0  Subjective:      Patient ID: Misty Romero is a 63 y o  female      62 y/o female with hx of htn c/o recent covid infection 11/28/22  Pt reports persistent cough - green phlegm at this time, chest tightness with exertion   Pt states she hears herself wheezing at times especially at bedtime laying down  She reports sinus pain and pressure that has not resolved since her covid dx  She works as  and is concerned with spreading something at work    Pt c/o inability to lose weight  Had taken phenetermine in past for 2 months and reports some improvement w/ weight  She is also planning to increase her exercise in the new year  She would consider referral to weight management       The following portions of the patient's history were reviewed and updated as appropriate: allergies, current medications, past family history, past medical history, past social history, past surgical history and problem list     Review of Systems   Constitutional: Negative for activity change, appetite change, chills, diaphoresis, fatigue and fever  HENT: Negative for congestion and sore throat  Respiratory: Positive for cough and chest tightness  Negative for shortness of breath and wheezing  Cardiovascular: Negative for chest pain and leg swelling  Gastrointestinal: Negative for abdominal pain, constipation, diarrhea and nausea  Musculoskeletal: Negative for arthralgias and gait problem  Skin: Negative for rash  Neurological: Negative for dizziness, light-headedness and headaches  Psychiatric/Behavioral: Negative for sleep disturbance  The patient is not nervous/anxious  Past Medical History:   Diagnosis Date   • Hypertension          Current Outpatient Medications:   •  albuterol (ProAir HFA) 90 mcg/act inhaler, Inhale 2 puffs every 6 (six) hours as needed for wheezing, Disp: 18 g, Rfl: 0  •  azithromycin (Zithromax) 250 mg tablet, Take 2 tablets (500 mg total) by mouth daily for 1 day, THEN 1 tablet (250 mg total) daily for 4 days  , Disp: 6 tablet, Rfl: 0  •  ketoconazole (NIZORAL) 2 % shampoo, Apply 1 application topically 2 (two) times a week, Disp: 120 mL, Rfl: 5  •  Multiple Vitamins-Minerals (MULTIVITAMIN WITH MINERALS) tablet, Take 1 tablet by mouth daily, Disp: , Rfl:   •  phentermine 30 MG capsule, Take 1 capsule (30 mg total) by mouth every morning, Disp: 30 capsule, Rfl: 1  •  traZODone (DESYREL) 100 mg tablet, Take 1 tablet (100 mg total) by mouth daily at bedtime, Disp: 90 tablet, Rfl: 0  •  rOPINIRole (REQUIP) 1 mg tablet, Take 2 tablets (2 mg total) by mouth daily at bedtime (Patient not taking: Reported on 12/22/2022), Disp: 30 tablet, Rfl: 1    Allergies   Allergen Reactions   • Other Itching and Rash     Dust, bugs        Social History   Past Surgical History:   Procedure Laterality Date   • ABLATION COLPOCLESIS     • TUBAL LIGATION       Family History   Problem Relation Age of Onset   • No Known Problems Mother    • Diabetes Father    • Glaucoma Father    • No Known Problems Sister    • No Known Problems Daughter    • Breast cancer Maternal Grandmother         unknown age   • No Known Problems Maternal Grandfather    • Breast cancer Paternal Grandmother         unknown age   • No Known Problems Paternal Grandfather    • No Known Problems Son    • No Known Problems Sister    • No Known Problems Paternal Aunt        Objective:  /68 (BP Location: Left arm, Patient Position: Sitting, Cuff Size: Standard)   Pulse 81   Temp (!) 97 4 °F (36 3 °C) (Temporal)   Ht 5' 10" (1 778 m)   Wt 100 kg (221 lb)   LMP  (LMP Unknown)   SpO2 96%   BMI 31 71 kg/m²        Physical Exam  Vitals reviewed  Constitutional:       General: She is not in acute distress  Appearance: She is obese  HENT:      Head: Normocephalic  Right Ear: Tympanic membrane, ear canal and external ear normal       Left Ear: Tympanic membrane, ear canal and external ear normal       Mouth/Throat:      Mouth: Mucous membranes are moist    Eyes:      General:         Right eye: No discharge  Left eye: No discharge  Extraocular Movements: Extraocular movements intact  Conjunctiva/sclera: Conjunctivae normal       Pupils: Pupils are equal, round, and reactive to light  Cardiovascular:      Rate and Rhythm: Normal rate and regular rhythm  Pulmonary:      Effort: Pulmonary effort is normal  No respiratory distress        Breath sounds: Wheezing (exp wheeze b/l ) present  No rales  Abdominal:      General: Bowel sounds are normal  There is no distension  Musculoskeletal:         General: Normal range of motion  Cervical back: Normal range of motion  Right lower leg: No edema  Left lower leg: No edema  Lymphadenopathy:      Cervical: No cervical adenopathy  Skin:     General: Skin is warm  Findings: No erythema or rash  Neurological:      General: No focal deficit present  Mental Status: She is alert

## 2022-12-30 ENCOUNTER — CONSULT (OUTPATIENT)
Dept: BARIATRICS | Facility: CLINIC | Age: 62
End: 2022-12-30

## 2022-12-30 VITALS
BODY MASS INDEX: 31.09 KG/M2 | HEIGHT: 70 IN | WEIGHT: 217.2 LBS | SYSTOLIC BLOOD PRESSURE: 122 MMHG | DIASTOLIC BLOOD PRESSURE: 70 MMHG | HEART RATE: 90 BPM | RESPIRATION RATE: 16 BRPM

## 2022-12-30 DIAGNOSIS — E66.9 OBESITY (BMI 30.0-34.9): ICD-10-CM

## 2022-12-30 DIAGNOSIS — I10 BENIGN ESSENTIAL HYPERTENSION: Primary | ICD-10-CM

## 2022-12-30 PROBLEM — E66.811 OBESITY, CLASS I, BMI 30-34.9: Status: ACTIVE | Noted: 2022-12-30

## 2022-12-30 PROBLEM — R73.03 PREDIABETES: Status: ACTIVE | Noted: 2022-12-30

## 2022-12-30 NOTE — PATIENT INSTRUCTIONS
Weight Management   WHAT YOU NEED TO KNOW:   Why is important to manage my weight? Being overweight increases your risk of health conditions such as heart disease, high blood pressure, type 2 diabetes, and certain types of cancer  It can also increase your risk for osteoarthritis, sleep apnea, and other respiratory problems  Aim for a slow, steady weight loss  Even a small amount of weight loss can lower your risk of health problems  What are the risks of being overweight? Extra weight can cause many health problems, including the following:  Diabetes (high blood sugar level)    High blood pressure or high cholesterol    Heart disease    Stroke    Gallbladder or liver disease    Cancer of the colon, breast, prostate, liver, or kidney    Sleep apnea    Arthritis or gout    What do I need to know about screening? Screening is done to check for health conditions before you have signs or symptoms  If you are 28to 79years old, your blood sugar level may be checked every 3 years for signs of prediabetes or diabetes  Your healthcare provider will check your blood pressure at each visit  High blood pressure can lead to a stroke or other problems  Your provider may check for signs of heart disease, cancer, or other health problems  How do I lose weight safely? A safe and healthy way to lose weight is to eat fewer calories and get regular exercise  You can lose up about 1 pound a week by decreasing the number of calories you eat by 500 calories each day  You can decrease calories by eating smaller portion sizes or by cutting out high-calorie foods  Read labels to find out how many calories are in the foods you eat  You can also burn calories with exercise such as walking, swimming, or biking  You will be more likely to keep weight off if you make these changes part of your lifestyle  Exercise at least 30 minutes per day on most days of the week   You can also fit in more physical activity by taking the stairs instead of the elevator or parking farther away from stores  Ask your healthcare provider about the best exercise plan for you  What is a healthy meal plan that can help me manage my weight? A healthy meal plan includes a variety of foods, contains fewer calories, and helps you stay healthy  A healthy meal plan includes the following:     Eat whole-grain foods more often  A healthy meal plan should contain fiber  Fiber is the part of grains, fruits, and vegetables that is not broken down by your body  Whole-grain foods are healthy and provide extra fiber in your diet  Some examples of whole-grain foods are whole-wheat breads and pastas, oatmeal, brown rice, and bulgur  Eat a variety of vegetables every day  Include dark, leafy greens such as spinach, kale, my greens, and mustard greens  Eat yellow and orange vegetables such as carrots, sweet potatoes, and winter squash  Eat a variety of fruits every day  Choose fresh or canned fruit (canned in its own juice or light syrup) instead of juice  Fruit juice has very little or no fiber  Eat low-fat dairy foods  Drink fat-free (skim) milk or 1% milk  Eat fat-free yogurt and low-fat cottage cheese  Try low-fat cheeses such as mozzarella and other reduced-fat cheeses  Choose meat and other protein foods that are low in fat  Choose beans or other legumes such as split peas or lentils  Choose fish, skinless poultry (chicken or turkey), or lean cuts of red meat (beef or pork)  Before you cook meat or poultry, cut off any visible fat  Use less fat and oil  Try baking foods instead of frying them  Add less fat, such as margarine, sour cream, regular salad dressing and mayonnaise to foods  Eat fewer high-fat foods  Some examples of high-fat foods include french fries, doughnuts, ice cream, and cakes  Eat fewer sweets  Limit foods and drinks that are high in sugar  This includes candy, cookies, regular soda, and sweetened drinks      What are some ways I can decrease calories? Eat smaller portions  Use a small plate with smaller servings  Do not eat second helpings  When you eat at a restaurant, ask for a box and place half of your meal in the box before you eat  Share an entrée with someone else  Replace high-calorie snacks with healthy, low-calorie snacks  Choose fresh fruit, vegetables, fat-free rice cakes, or air-popped popcorn instead of potato chips, nuts, or chocolate  Choose water or calorie-free drinks instead of soda or sweetened drinks  Do not shop for groceries when you are hungry  You may be more likely to make unhealthy food choices  Take a grocery list of healthy foods and shop after you have eaten  Eat regular meals  Do not skip meals  Skipping meals can lead to overeating later in the day  This can make it harder for you to lose weight  Eat a healthy snack in place of a meal if you do not have time to eat a regular meal  Talk with a dietitian to help you create a meal plan and schedule that is right for you  What other things should I consider as I try to lose weight? Be aware of situations that may give you the urge to overeat, such as eating while watching television  Find ways to avoid these situations  For example, read a book, go for a walk, or do crafts  Meet with a weight loss support group or friends who are also trying to lose weight  This may help you stay motivated to continue working on your weight loss goals  CARE AGREEMENT:   You have the right to help plan your care  Learn about your health condition and how it may be treated  Discuss treatment options with your healthcare providers to decide what care you want to receive  You always have the right to refuse treatment  The above information is an  only  It is not intended as medical advice for individual conditions or treatments   Talk to your doctor, nurse or pharmacist before following any medical regimen to see if it is safe and effective for you  © Copyright [employer] 2022 Information is for End User's use only and may not be sold, redistributed or otherwise used for commercial purposes   All illustrations and images included in CareNotes® are the copyrighted property of [employer]  or [employer]

## 2022-12-30 NOTE — ASSESSMENT & PLAN NOTE
-Discussed options of HealthyCORE-Intensive Lifestyle Intervention Program, Very Low Calorie Diet-VLCD and Conservative Program and the role of weight loss medications  Explained the importance of making lifestyle changes first before starting anti-obesity medications   -Initial weight loss goal of 5-10% weight loss for improved health  -Screening labs  Recommend checking lab coverage before having labs drawn  -NEEDS LABS-has ordered from PCP to check cbc, cmp, tsh, and plans to go this week  -Labs reviewed from 8/17/22 A1C 5 7 and lipids elevated  - STOP BANG-4/8-declined sleep med referral feels  sleep concern related to past trauma    -Patient is interested in pursuing conservative plan  Due to time restraints not interested in healthycore and has seen RD prior      Goals:  -Food log (ie ) www myfitnesspal com,sparkpeople  com,loseit com,calorieking  com,etc  -1200 calorie goal  Discussed avoiding meal skipping  -No sugary beverages  At least 64oz of water daily   -goal to start peloton workouts at least 3 times a week , but her personal goal is 4 times  Recommend to stop phentermine  Has been taking since 10/23/21 and has not been helping with weight loss  Has been working with PCP and modifying diet over the last year  Will start azar  Patient denies personal and family history of MCT and MEN2 tumors  Patient denies personal history of pancreatitis  Side effects discussed but not limited to diarrhea, bloating, constipation, GI upset, heartburn, increased heart rate, headache, low blood sugar, fatigue and dizziness  Titration and medication administration discussed

## 2022-12-30 NOTE — PROGRESS NOTES
Assessment/Plan:    Obesity, Class I, BMI 30-34 9  -Discussed options of HealthyCORE-Intensive Lifestyle Intervention Program, Very Low Calorie Diet-VLCD and Conservative Program and the role of weight loss medications  Explained the importance of making lifestyle changes first before starting anti-obesity medications   -Initial weight loss goal of 5-10% weight loss for improved health  -Screening labs  Recommend checking lab coverage before having labs drawn  -NEEDS LABS-has ordered from PCP to check cbc, cmp, tsh, and plans to go this week  -Labs reviewed from 8/17/22 A1C 5 7 and lipids elevated  - STOP BANG-4/8-declined sleep med referral feels  sleep concern related to past trauma    -Patient is interested in pursuing conservative plan  Due to time restraints not interested in healthycore and has seen RD prior      Goals:  -Food log (ie ) www myfitnesspal com,sparkpeople  com,loseit com,calorieking  com,etc  -1200 calorie goal  Discussed avoiding meal skipping  -No sugary beverages  At least 64oz of water daily   -goal to start peloton workouts at least 3 times a week , but her personal goal is 4 times  Recommend to stop phentermine  Has been taking since 10/23/21 and has not been helping with weight loss  Has been working with PCP and modifying diet over the last year  Will start saxenda  Patient denies personal and family history of MCT and MEN2 tumors  Patient denies personal history of pancreatitis  Side effects discussed but not limited to diarrhea, bloating, constipation, GI upset, heartburn, increased heart rate, headache, low blood sugar, fatigue and dizziness  Titration and medication administration discussed  Benign essential hypertension  Blood pressure controlled   Not on medication    Prediabetes  -should improve with weight loss, dietary, and lifestyle changes and also likely to improve with saxenda      Follow up in approximately 2 months with Non-Surgical Physician/Advanced Practitioner  Diagnoses and all orders for this visit:    Benign essential hypertension    Obesity (BMI 30 0-34  9)  Comments:  will resume short course phentermine  refer to weight management   Orders:  -     Ambulatory Referral to Weight Management  -     liraglutide (SAXENDA) injection; Inject subcutaneously WEEK 1 use 0 6mg day,  WEEK 2 use 1 2mg day, WEEK 3 use 1 8mg day, WEEK 4 use 2 4mg day, WEEK 5 use 3mg day  -     Insulin Pen Needle 32G X 4 MM MISC; Use in the morning          Subjective:   Chief Complaint   Patient presents with   • Consult     MWM consult; waist 41in; goal wt 160; stop bang 4-8       Patient ID: Chucho Rivera  is a 63 y o  female with excess weight/obesity here to pursue weight management  History reviewed  No pertinent past medical history  HPI: Here for MWM consult    Here for MWM consult   She has been seeing PCP to help with weight loss over the last year  She has been on phentermine since Oct 2021  She does not think it is helping with weight loss  She thinks initially it may have helped some but over last 6-8 months she feels she is gaining  She has modified her diet  She does rarely drinks alcohol, no soda and tries to make good food choices  She avoids red meats  She has spoke with dietician prior and has done a REE and it has been normal She is interested in other medication options for weight loss    Diet recall:  B: hard boiled eggs or rye toast with PB or rare special k cereal w/unsweetened almond milk  S: sometimes yogurt  L: rare due to work to sit and eat   Sometimes protein shake -premier   D:chicken or salmon w/brocoli  S:    Obesity/Excess Weight:  Severity: class I   Onset:  6-8 months     Modifiers: Diet and Exercise and Prescription Weight Loss Medications  Contributing factors: Menopause  Associated symptoms: fatigue, increased joint pain and body image issues    Goals:160  Hydration:water at least 64 oz a day, sparkling water, green tea brewed  Alcohol: none   Exercise:nothing outside work for now but has peloton now and plans to do 4 days of the week  Occupation:works at [employer] and works as a   Dining out/takeout: 1 time a week  Cravings: more sweets    Colonoscopy-cologuard ordered by PCP    The following portions of the patient's history were reviewed and updated as appropriate: She  has no past medical history on file  She   Patient Active Problem List    Diagnosis Date Noted   • Obesity, Class I, BMI 30-34 9 12/30/2022   • Prediabetes 12/30/2022   • Bursitis of left knee 07/23/2021   • Acute pain of left knee 07/23/2021   • COVID-19 12/01/2020   • Benign essential hypertension 12/04/2014   • Anxiety state 02/20/2014   • Leiomyoma of uterus 11/21/2013     She  has a past surgical history that includes Tubal ligation and Ablation colpoclesis  Her family history includes Breast cancer in her maternal grandmother and paternal grandmother; Diabetes in her father; Glaucoma in her father; No Known Problems in her daughter, maternal grandfather, mother, paternal aunt, paternal grandfather, sister, sister, and son  She  reports that she has never smoked  She has never used smokeless tobacco  She reports current alcohol use of about 1 0 standard drink per week  She reports that she does not use drugs    Current Outpatient Medications   Medication Sig Dispense Refill   • albuterol (ProAir HFA) 90 mcg/act inhaler Inhale 2 puffs every 6 (six) hours as needed for wheezing 18 g 0   • Insulin Pen Needle 32G X 4 MM MISC Use in the morning 100 each 0   • ketoconazole (NIZORAL) 2 % shampoo Apply 1 application topically 2 (two) times a week 120 mL 5   • liraglutide (SAXENDA) injection Inject subcutaneously WEEK 1 use 0 6mg day,  WEEK 2 use 1 2mg day, WEEK 3 use 1 8mg day, WEEK 4 use 2 4mg day, WEEK 5 use 3mg day 15 mL 1   • Multiple Vitamins-Minerals (MULTIVITAMIN WITH MINERALS) tablet Take 1 tablet by mouth daily     • rOPINIRole (REQUIP) 1 mg tablet Take 2 tablets (2 mg total) by mouth daily at bedtime 30 tablet 1   • traZODone (DESYREL) 100 mg tablet Take 1 tablet (100 mg total) by mouth daily at bedtime 90 tablet 0     No current facility-administered medications for this visit  Current Outpatient Medications on File Prior to Visit   Medication Sig   • albuterol (ProAir HFA) 90 mcg/act inhaler Inhale 2 puffs every 6 (six) hours as needed for wheezing   • ketoconazole (NIZORAL) 2 % shampoo Apply 1 application topically 2 (two) times a week   • Multiple Vitamins-Minerals (MULTIVITAMIN WITH MINERALS) tablet Take 1 tablet by mouth daily   • rOPINIRole (REQUIP) 1 mg tablet Take 2 tablets (2 mg total) by mouth daily at bedtime   • traZODone (DESYREL) 100 mg tablet Take 1 tablet (100 mg total) by mouth daily at bedtime   • [DISCONTINUED] phentermine 30 MG capsule Take 1 capsule (30 mg total) by mouth every morning     No current facility-administered medications on file prior to visit  She is allergic to other       Review of Systems   Constitutional: Positive for fatigue  Negative for chills and fever  Respiratory: Negative for shortness of breath  Cardiovascular: Negative for chest pain and palpitations  Gastrointestinal: Negative for abdominal pain, constipation, diarrhea and vomiting  Genitourinary: Negative for difficulty urinating  Musculoskeletal: Negative for arthralgias and back pain  Skin: Negative for rash  Neurological: Negative for dizziness and headaches  Psychiatric/Behavioral: Positive for sleep disturbance (chronic-trazodone helps)  Negative for dysphoric mood  The patient is not nervous/anxious  Objective:    /70   Pulse 90   Resp 16   Ht 5' 10" (1 778 m)   Wt 98 5 kg (217 lb 3 2 oz)   LMP  (LMP Unknown)   BMI 31 16 kg/m²     Physical Exam  Vitals and nursing note reviewed  Constitutional:       General: She is not in acute distress  Appearance: She is well-developed  She is obese     HENT:      Head: Normocephalic and atraumatic  Eyes:      Conjunctiva/sclera: Conjunctivae normal    Neck:      Thyroid: No thyromegaly  Pulmonary:      Effort: Pulmonary effort is normal  No respiratory distress  Skin:     Findings: No rash (visible)  Neurological:      Mental Status: She is alert and oriented to person, place, and time     Psychiatric:         Behavior: Behavior normal

## 2022-12-30 NOTE — ASSESSMENT & PLAN NOTE
-should improve with weight loss, dietary, and lifestyle changes and also likely to improve with saxenda

## 2023-01-04 ENCOUNTER — TELEPHONE (OUTPATIENT)
Dept: BARIATRICS | Facility: CLINIC | Age: 63
End: 2023-01-04

## 2023-01-04 NOTE — TELEPHONE ENCOUNTER
Lmom regarding [employer] [facility] requesting office notes for the [facility] [address]   The notes were faxed this morning and we are awaiting an approval/denial

## 2023-02-09 ENCOUNTER — OFFICE VISIT (OUTPATIENT)
Dept: INTERNAL MEDICINE CLINIC | Age: 63
End: 2023-02-09

## 2023-02-09 VITALS
WEIGHT: 215 LBS | SYSTOLIC BLOOD PRESSURE: 128 MMHG | TEMPERATURE: 97.8 F | DIASTOLIC BLOOD PRESSURE: 64 MMHG | HEART RATE: 75 BPM | BODY MASS INDEX: 30.78 KG/M2 | HEIGHT: 70 IN | OXYGEN SATURATION: 98 %

## 2023-02-09 DIAGNOSIS — L29.9 PRURITUS: ICD-10-CM

## 2023-02-09 DIAGNOSIS — L50.9 HIVES: Primary | ICD-10-CM

## 2023-02-09 DIAGNOSIS — B37.9 CANDIDA ALBICANS INFECTION: ICD-10-CM

## 2023-02-09 DIAGNOSIS — J02.9 SORE THROAT: ICD-10-CM

## 2023-02-09 DIAGNOSIS — B37.2 CANDIDIASIS, CUTANEOUS: ICD-10-CM

## 2023-02-09 RX ORDER — PREDNISONE 20 MG/1
20 TABLET ORAL DAILY
Qty: 5 TABLET | Refills: 0 | Status: SHIPPED | OUTPATIENT
Start: 2023-02-09

## 2023-02-09 RX ORDER — KETOCONAZOLE 20 MG/G
CREAM TOPICAL 2 TIMES DAILY
Qty: 120 G | Refills: 1 | Status: SHIPPED | OUTPATIENT
Start: 2023-02-09

## 2023-02-09 RX ORDER — TRIAMCINOLONE ACETONIDE 1 MG/G
CREAM TOPICAL 2 TIMES DAILY
Qty: 80 G | Refills: 1 | Status: SHIPPED | OUTPATIENT
Start: 2023-02-09

## 2023-02-09 RX ORDER — FLUCONAZOLE 150 MG/1
150 TABLET ORAL ONCE
Qty: 1 TABLET | Refills: 0 | Status: SHIPPED | OUTPATIENT
Start: 2023-02-09 | End: 2023-02-09

## 2023-02-09 RX ORDER — HYDROXYZINE HYDROCHLORIDE 25 MG/1
25 TABLET, FILM COATED ORAL EVERY 6 HOURS PRN
Qty: 45 TABLET | Refills: 1 | Status: SHIPPED | OUTPATIENT
Start: 2023-02-09

## 2023-02-09 NOTE — PROGRESS NOTES
Assessment/Plan:         Diagnoses and all orders for this visit:    Hives  Comments:  likely secondary to saxenda  will contact weight management to discuss options   hold saxenda for now  short pred course, topical triamcinolone  Orders:  -     triamcinolone (KENALOG) 0 1 % cream; Apply topically 2 (two) times a day  -     predniSONE 20 mg tablet; Take 1 tablet (20 mg total) by mouth daily    Candidiasis, cutaneous  Comments:  groin and bra line  diflucan x 1  Orders:  -     ketoconazole (NIZORAL) 2 % cream; Apply topically 2 (two) times a day    Pruritus  Comments:  atarax prn   may make drowsy   pt aware  Orders:  -     hydrOXYzine HCL (ATARAX) 25 mg tablet; Take 1 tablet (25 mg total) by mouth every 6 (six) hours as needed for itching    Candida albicans infection  -     fluconazole (DIFLUCAN) 150 mg tablet; Take 1 tablet (150 mg total) by mouth once for 1 dose    Sore throat  Comments:  tc sent   Orders:  -     Throat culture; Future  -     Throat culture          Subjective:      Patient ID: Andrei Mccoy is a 63 y o  female  64 y/o female c/o rash on trunk, UE and groin x 6 days, pt reports it started last Friday in groin and has been progressing since onset at that time  She states she used a different washing machine and feels she got something from that  She reports she has tried cortisone, calamine and a holistic pill for itching but nothing has been able to take this away  Pt reports she recently increased her dose of saxenda       The following portions of the patient's history were reviewed and updated as appropriate: allergies, current medications, past family history, past medical history, past social history, past surgical history and problem list     Review of Systems   Constitutional: Negative for activity change, appetite change, chills, fatigue and fever  HENT: Negative for congestion, facial swelling, sore throat and trouble swallowing      Eyes: Negative for redness, itching and visual disturbance  Respiratory: Negative for cough, shortness of breath and wheezing  Cardiovascular: Negative for chest pain and leg swelling  Gastrointestinal: Negative for abdominal pain  Skin: Positive for rash and wound  Neurological: Negative for dizziness, light-headedness and headaches  Psychiatric/Behavioral: Positive for sleep disturbance  The patient is nervous/anxious  History reviewed  No pertinent past medical history        Current Outpatient Medications:   •  albuterol (ProAir HFA) 90 mcg/act inhaler, Inhale 2 puffs every 6 (six) hours as needed for wheezing, Disp: 18 g, Rfl: 0  •  fluconazole (DIFLUCAN) 150 mg tablet, Take 1 tablet (150 mg total) by mouth once for 1 dose, Disp: 1 tablet, Rfl: 0  •  hydrOXYzine HCL (ATARAX) 25 mg tablet, Take 1 tablet (25 mg total) by mouth every 6 (six) hours as needed for itching, Disp: 45 tablet, Rfl: 1  •  Insulin Pen Needle 32G X 4 MM MISC, Use in the morning, Disp: 100 each, Rfl: 0  •  ketoconazole (NIZORAL) 2 % cream, Apply topically 2 (two) times a day, Disp: 120 g, Rfl: 1  •  ketoconazole (NIZORAL) 2 % shampoo, Apply 1 application topically 2 (two) times a week, Disp: 120 mL, Rfl: 5  •  liraglutide (SAXENDA) injection, Inject subcutaneously WEEK 1 use 0 6mg day,  WEEK 2 use 1 2mg day, WEEK 3 use 1 8mg day, WEEK 4 use 2 4mg day, WEEK 5 use 3mg day, Disp: 15 mL, Rfl: 1  •  Multiple Vitamins-Minerals (MULTIVITAMIN WITH MINERALS) tablet, Take 1 tablet by mouth daily, Disp: , Rfl:   •  predniSONE 20 mg tablet, Take 1 tablet (20 mg total) by mouth daily, Disp: 5 tablet, Rfl: 0  •  rOPINIRole (REQUIP) 1 mg tablet, Take 2 tablets (2 mg total) by mouth daily at bedtime, Disp: 30 tablet, Rfl: 1  •  traZODone (DESYREL) 100 mg tablet, Take 1 tablet (100 mg total) by mouth daily at bedtime, Disp: 90 tablet, Rfl: 0  •  triamcinolone (KENALOG) 0 1 % cream, Apply topically 2 (two) times a day, Disp: 80 g, Rfl: 1    Allergies   Allergen Reactions   • Other Itching and Rash     Dust, bugs        Social History   Past Surgical History:   Procedure Laterality Date   • ABLATION COLPOCLESIS     • TUBAL LIGATION       Family History   Problem Relation Age of Onset   • No Known Problems Mother    • Diabetes Father    • Glaucoma Father    • No Known Problems Sister    • No Known Problems Daughter    • Breast cancer Maternal Grandmother         unknown age   • No Known Problems Maternal Grandfather    • Breast cancer Paternal Grandmother         unknown age   • No Known Problems Paternal Grandfather    • No Known Problems Son    • No Known Problems Sister    • No Known Problems Paternal Aunt        Objective:  /64 (BP Location: Left arm, Patient Position: Sitting, Cuff Size: Standard)   Pulse 75   Temp 97 8 °F (36 6 °C) (Temporal)   Ht 5' 10" (1 778 m)   Wt 97 5 kg (215 lb)   LMP  (LMP Unknown)   SpO2 98%   BMI 30 85 kg/m²        Physical Exam  Constitutional:       General: She is not in acute distress  Appearance: She is not toxic-appearing  HENT:      Head: Normocephalic and atraumatic  Nose: Nose normal  No congestion  Mouth/Throat:      Mouth: Mucous membranes are moist       Pharynx: Posterior oropharyngeal erythema present  No oropharyngeal exudate  Eyes:      Extraocular Movements: Extraocular movements intact  Conjunctiva/sclera: Conjunctivae normal       Pupils: Pupils are equal, round, and reactive to light  Cardiovascular:      Rate and Rhythm: Normal rate and regular rhythm  Pulmonary:      Effort: Pulmonary effort is normal  No respiratory distress  Breath sounds: Normal breath sounds  No wheezing or rales  Musculoskeletal:      Cervical back: Normal range of motion  Right lower leg: No edema  Left lower leg: No edema  Lymphadenopathy:      Cervical: No cervical adenopathy     Skin:     Findings: Bruising (lower abdomen ) and rash (erythematous hives on trunk, ab folds, between breast, multiple eccyhmotic areas with overlaying rash, satelittle lesions in groin ) present  Neurological:      General: No focal deficit present  Mental Status: She is alert and oriented to person, place, and time     Psychiatric:         Mood and Affect: Mood normal          Behavior: Behavior normal

## 2023-02-11 LAB — BACTERIA THROAT CULT: NORMAL

## 2023-02-13 ENCOUNTER — TELEPHONE (OUTPATIENT)
Dept: BARIATRICS | Facility: CLINIC | Age: 63
End: 2023-02-13

## 2023-02-13 NOTE — TELEPHONE ENCOUNTER
Patient called stating that she had a possible allergic reaction to [address] and wants to know what to do next

## 2023-02-13 NOTE — TELEPHONE ENCOUNTER
Patient called and left a message stating that she was going to restart her Saxenda  She states that she has been off of it for 3 days so she is going to restart it to see if it was the med that caused the rash or the change in her detergent

## 2023-02-13 NOTE — TELEPHONE ENCOUNTER
Patient was informed  She states that she has been treated regarding the reaction  She states that her PCP said that she could be given another med in 4-6wks  I informed her of scheduling an appointment in 4-8wks to discuss other options and she states that that is too longer to wait  Please advise

## 2023-02-24 ENCOUNTER — OFFICE VISIT (OUTPATIENT)
Dept: BARIATRICS | Facility: CLINIC | Age: 63
End: 2023-02-24

## 2023-02-24 VITALS
RESPIRATION RATE: 16 BRPM | HEART RATE: 72 BPM | HEIGHT: 70 IN | BODY MASS INDEX: 30.78 KG/M2 | DIASTOLIC BLOOD PRESSURE: 80 MMHG | WEIGHT: 215 LBS | SYSTOLIC BLOOD PRESSURE: 124 MMHG

## 2023-02-24 DIAGNOSIS — E66.9 OBESITY, CLASS I, BMI 30-34.9: Primary | ICD-10-CM

## 2023-02-24 DIAGNOSIS — E66.9 OBESITY (BMI 30.0-34.9): ICD-10-CM

## 2023-02-24 NOTE — PROGRESS NOTES
Assessment/Plan:      Follow up in approximately 2 months    Obesity, Class I, BMI 30-34 9  -Pt pursuing conservative program   -Initial weight loss goal of 5-10% weight loss for improved health  -Screening labs  Recommend checking lab coverage before having labs drawn  -NEEDS LABS-has ordered from PCP to check cbc, cmp, tsh, and advised to go this week patient states she is going tomorrow AM  -Labs reviewed from 8/17/22 A1C 5 7 and lipids elevated  - STOP BANG-4/8-declined sleep med referral feels  sleep concern related to past trauma  -  Pt was started on saxenda at last visit  She states after starting, she developed a rash which she thinks may be due to an irritant from the laundry mat  She completed course of prednisone and for 2 weeks was off her saxenda  Rash has since resolved, pt back on saxenda and finally is up to 3 mg this week  Overall this helps to control her hunger    - she is not food logging but conscious of her food choices  Stays around 1200 veena/day  She exercises consistently     Initial weight:217  Current: 215 lbs      Patient denies personal and family history of MCT and MEN2 tumors  Patient denies personal history of pancreatitis  Side effects discussed but not limited to diarrhea, bloating, constipation, GI upset, heartburn, increased heart rate, headache, low blood sugar, fatigue and dizziness  Titration and medication administration discussed      Goals:  Food log (ie ) www myfitnesspal com,sparkpeople  com,loseit com,calorieking  com,etc  baritastic  No sugary beverages  At least 64oz of water daily  Increase physical activity by 10 minutes daily   Gradually increase physical activity to a goal of 5 days per week for 30 minutes of MODERATE intensity PLUS 2 days per week of FULL BODY resistance training  5-10 servings of fruits and vegetables per day and 25-35 grams of dietary fiber per day, gradually increasing  1200 veena/day     Diagnoses and all orders for this visit:    Obesity, Class I, BMI 30-34 9    Obesity (BMI 30 0-34  9)  Comments:  will resume short course phentermine  refer to weight management   Orders:  -     liraglutide (SAXENDA) injection; Inject subcutaneously WEEK 1 use 0 6mg day,  WEEK 2 use 1 2mg day, WEEK 3 use 1 8mg day, WEEK 4 use 2 4mg day, WEEK 5 use 3mg day    Other orders  -     CALCIUM PO; Take by mouth  -     Cholecalciferol (VITAMIN D-3 PO); Take by mouth        Subjective:   Chief Complaint   Patient presents with   • Follow-up     MWM 2mth f/u; waist 40in     Patient ID: Geronimo Prescott  is a 63 y o  female with excess weight/obesity here to pursue weight management  Patient is pursuing Conservative Program    HPI  The patient presents for MWM follow up  Food logging: no but trying to be conscious of food  Increased appetite/cravings:controlled  Fruit/Vegetable servings:  Exercise: walking and pelotin  Sleep:poor sleep at baseline   Colonoscopy- has cologuard to complete   Mammo: utd per pt     The following portions of the patient's history were reviewed and updated as appropriate: allergies, current medications, past family history, past medical history, past social history, past surgical history and problem list     Review of Systems   Constitutional: Negative  Respiratory: Negative  Cardiovascular: Negative  Gastrointestinal: Negative  Neurological: Negative  Psychiatric/Behavioral: Negative  Objective:    /80   Pulse 72   Resp 16   Ht 5' 10" (1 778 m)   Wt 97 5 kg (215 lb)   LMP  (LMP Unknown)   BMI 30 85 kg/m²      Physical Exam  Vitals and nursing note reviewed  Constitutional:       Appearance: Normal appearance  She is obese  HENT:      Head: Normocephalic and atraumatic  Eyes:      Extraocular Movements: Extraocular movements intact  Pupils: Pupils are equal, round, and reactive to light  Cardiovascular:      Rate and Rhythm: Normal rate and regular rhythm     Pulmonary:      Effort: Pulmonary effort is normal  Breath sounds: Normal breath sounds  Musculoskeletal:         General: Normal range of motion  Cervical back: Normal range of motion  Skin:     General: Skin is warm and dry  Neurological:      General: No focal deficit present  Mental Status: She is alert and oriented to person, place, and time  Psychiatric:         Mood and Affect: Mood normal        30 minute visit, >50% face-to-face time spent counseling patient on diet behavior and exercise modification for weight loss

## 2023-02-24 NOTE — PATIENT INSTRUCTIONS
Goals: Food log (ie ) www myfitnesspal com,sparkpeople  com,loseit com,calorieking  com,etc  baritastic  No sugary beverages  At least 64oz of water daily  Increase physical activity by 10 minutes daily   Gradually increase physical activity to a goal of 5 days per week for 30 minutes of MODERATE intensity PLUS 2 days per week of FULL BODY resistance training  5-10 servings of fruits and vegetables per day and 25-35 grams of dietary fiber per day, gradually increasing  1200 veena/day

## 2023-03-31 DIAGNOSIS — F51.01 PRIMARY INSOMNIA: ICD-10-CM

## 2023-03-31 RX ORDER — TRAZODONE HYDROCHLORIDE 100 MG/1
100 TABLET ORAL
Qty: 90 TABLET | Refills: 1 | Status: SHIPPED | OUTPATIENT
Start: 2023-03-31

## 2023-03-31 NOTE — TELEPHONE ENCOUNTER
Patient is calling to request the following medication to be refilled     Send to [facility] in [facility] on [address]

## 2023-04-26 ENCOUNTER — APPOINTMENT (OUTPATIENT)
Dept: LAB | Facility: CLINIC | Age: 63
End: 2023-04-26

## 2023-04-26 DIAGNOSIS — R53.83 FATIGUE, UNSPECIFIED TYPE: ICD-10-CM

## 2023-04-26 DIAGNOSIS — Z13.228 ENCOUNTER FOR SCREENING FOR METABOLIC DISORDER: ICD-10-CM

## 2023-04-26 DIAGNOSIS — Z00.8 ENCOUNTER FOR OTHER GENERAL EXAMINATION: ICD-10-CM

## 2023-04-26 LAB
ALBUMIN SERPL BCP-MCNC: 3.6 G/DL (ref 3.5–5)
ALP SERPL-CCNC: 80 U/L (ref 46–116)
ALT SERPL W P-5'-P-CCNC: 39 U/L (ref 12–78)
ANION GAP SERPL CALCULATED.3IONS-SCNC: 4 MMOL/L (ref 4–13)
AST SERPL W P-5'-P-CCNC: 27 U/L (ref 5–45)
BASOPHILS # BLD AUTO: 0.05 THOUSANDS/ΜL (ref 0–0.1)
BASOPHILS NFR BLD AUTO: 1 % (ref 0–1)
BILIRUB SERPL-MCNC: 0.36 MG/DL (ref 0.2–1)
BUN SERPL-MCNC: 19 MG/DL (ref 5–25)
CALCIUM SERPL-MCNC: 9 MG/DL (ref 8.3–10.1)
CHLORIDE SERPL-SCNC: 109 MMOL/L (ref 96–108)
CHOLEST SERPL-MCNC: 189 MG/DL
CO2 SERPL-SCNC: 24 MMOL/L (ref 21–32)
CREAT SERPL-MCNC: 1.07 MG/DL (ref 0.6–1.3)
EOSINOPHIL # BLD AUTO: 0.19 THOUSAND/ΜL (ref 0–0.61)
EOSINOPHIL NFR BLD AUTO: 3 % (ref 0–6)
ERYTHROCYTE [DISTWIDTH] IN BLOOD BY AUTOMATED COUNT: 13.2 % (ref 11.6–15.1)
GFR SERPL CREATININE-BSD FRML MDRD: 56 ML/MIN/1.73SQ M
GLUCOSE P FAST SERPL-MCNC: 99 MG/DL (ref 65–99)
HCT VFR BLD AUTO: 41.8 % (ref 34.8–46.1)
HDLC SERPL-MCNC: 82 MG/DL
HGB BLD-MCNC: 13.1 G/DL (ref 11.5–15.4)
IMM GRANULOCYTES # BLD AUTO: 0.01 THOUSAND/UL (ref 0–0.2)
IMM GRANULOCYTES NFR BLD AUTO: 0 % (ref 0–2)
LDLC SERPL CALC-MCNC: 92 MG/DL (ref 0–100)
LYMPHOCYTES # BLD AUTO: 2.17 THOUSANDS/ΜL (ref 0.6–4.47)
LYMPHOCYTES NFR BLD AUTO: 34 % (ref 14–44)
MCH RBC QN AUTO: 27.6 PG (ref 26.8–34.3)
MCHC RBC AUTO-ENTMCNC: 31.3 G/DL (ref 31.4–37.4)
MCV RBC AUTO: 88 FL (ref 82–98)
MONOCYTES # BLD AUTO: 0.53 THOUSAND/ΜL (ref 0.17–1.22)
MONOCYTES NFR BLD AUTO: 8 % (ref 4–12)
NEUTROPHILS # BLD AUTO: 3.53 THOUSANDS/ΜL (ref 1.85–7.62)
NEUTS SEG NFR BLD AUTO: 54 % (ref 43–75)
NONHDLC SERPL-MCNC: 107 MG/DL
NRBC BLD AUTO-RTO: 0 /100 WBCS
PLATELET # BLD AUTO: 329 THOUSANDS/UL (ref 149–390)
PMV BLD AUTO: 9.6 FL (ref 8.9–12.7)
POTASSIUM SERPL-SCNC: 3.7 MMOL/L (ref 3.5–5.3)
PROT SERPL-MCNC: 7.7 G/DL (ref 6.4–8.4)
RBC # BLD AUTO: 4.75 MILLION/UL (ref 3.81–5.12)
SODIUM SERPL-SCNC: 137 MMOL/L (ref 135–147)
TRIGL SERPL-MCNC: 74 MG/DL
TSH SERPL DL<=0.05 MIU/L-ACNC: 3.8 UIU/ML (ref 0.45–4.5)
WBC # BLD AUTO: 6.48 THOUSAND/UL (ref 4.31–10.16)

## 2023-04-28 DIAGNOSIS — E66.9 OBESITY (BMI 30.0-34.9): ICD-10-CM

## 2023-04-29 LAB
EST. AVERAGE GLUCOSE BLD GHB EST-MCNC: 117 MG/DL
HBA1C MFR BLD: 5.7 %

## 2023-05-01 ENCOUNTER — TELEPHONE (OUTPATIENT)
Dept: BARIATRICS | Facility: CLINIC | Age: 63
End: 2023-05-01

## 2023-05-01 ENCOUNTER — OFFICE VISIT (OUTPATIENT)
Dept: BARIATRICS | Facility: CLINIC | Age: 63
End: 2023-05-01

## 2023-05-01 VITALS
RESPIRATION RATE: 16 BRPM | BODY MASS INDEX: 30.64 KG/M2 | DIASTOLIC BLOOD PRESSURE: 99 MMHG | SYSTOLIC BLOOD PRESSURE: 140 MMHG | HEIGHT: 70 IN | HEART RATE: 99 BPM | WEIGHT: 214 LBS

## 2023-05-01 DIAGNOSIS — I10 BENIGN ESSENTIAL HYPERTENSION: ICD-10-CM

## 2023-05-01 DIAGNOSIS — R73.03 PREDIABETES: ICD-10-CM

## 2023-05-01 DIAGNOSIS — R63.5 ABNORMAL WEIGHT GAIN: Primary | ICD-10-CM

## 2023-05-01 DIAGNOSIS — E66.9 OBESITY, CLASS I, BMI 30-34.9: ICD-10-CM

## 2023-05-01 NOTE — TELEPHONE ENCOUNTER
Pt called back I read her the message and she verbalized understanding  She had no further questions

## 2023-05-01 NOTE — ASSESSMENT & PLAN NOTE
-not currently on any treatment  -may improve with 5-10% TBW loss  -has follow up visit on Wed to discuss wit hPCP

## 2023-05-01 NOTE — TELEPHONE ENCOUNTER
Eric and called pharm regarding the [facility] approval  She was also informed about the 4-5% weight loss requirement for renewal purposes

## 2023-05-01 NOTE — ASSESSMENT & PLAN NOTE
-Patient is pursuing Conservative Program  -Initial weight loss goal of 5-10% weight loss for improved health  -Previously on Phentermine  -Screening labs: up to date 4/26/23  -Started on [address] [facility] 1/9/2023  Not currently meeting requirements to continue  Patient reports not feeling much effect from the medication  -Dietary recall reviewed  Overall choosing healthy foods but not measuring or logging  Encouraged this  -not meeting exercise goals  Reports working 2 jobs and caring for grandchildren so time constraint  -Patient interested in trial of Wegovy  Denies hx Pancreatitis or family hx MEN2 or MTC  SE profile reviewed   Patient to follow up in 6 week for MA visit to check vitals  -medication agreement signed    Initial: 217 2 lbs  Current: 214 lbs  Change: -3 2 lbs  Goal: 160 lbs

## 2023-05-01 NOTE — PATIENT INSTRUCTIONS
Goals: Food log (ie ) www myfitnesspal com,sparkpeople  com,loseit com,calorieking  com,etc    No sugary beverages  At least 64oz of water daily  Increase physical activity by 10 minutes daily  Gradually increase physical activity to a goal of 5 days per week for 30 minutes of MODERATE intensity PLUS 2 days per week of FULL BODY resistance training  3477-3571 calories per day  5-10 servings of fruits and vegetables per day  100 grams of protein per day    Visit wegovy  com for further information/injection instructions  Please eat small frequent meals to help reduce nausea  Lemon water and saltine crackers may help with this  If you experience fever, nausea/vomiting, and pain radiating to your back this may be a sign of pancreatitis  Please have ER evaluation with this occurs

## 2023-05-01 NOTE — PROGRESS NOTES
Assessment/Plan:    Obesity, Class I, BMI 30-34 9  -Patient is pursuing Conservative Program  -Initial weight loss goal of 5-10% weight loss for improved health  -Previously on Phentermine  -Screening labs: up to date 4/26/23  -Started on Saxenda 1/9/2023  Not currently meeting requirements to continue  Patient reports not feeling much effect from the medication  -Dietary recall reviewed  Overall choosing healthy foods but not measuring or logging  Encouraged this  -not meeting exercise goals  Reports working 2 jobs and caring for grandchildren so time constraint  -Patient interested in trial of Wegovy  Denies hx Pancreatitis or family hx MEN2 or MTC  SE profile reviewed  Patient to follow up in 6 week for MA visit to check vitals  -medication agreement signed    Initial: 217 2 lbs  Current: 214 lbs  Change: -3 2 lbs  Goal: 160 lbs    Prediabetes  -HgbA1c 5 7  -limit refined carbohydrates, increase cardiovasculare exercise    Benign essential hypertension  -not currently on any treatment  -may improve with 5-10% TBW loss  -has follow up visit on Wed to discuss wit [facility]    Goals:    Food log (ie ) www myfitnesspal com,sparkpeople  com,loseit com,calorieking  com,etc    No sugary beverages  At least 64oz of water daily  Increase physical activity by 10 minutes daily  Gradually increase physical activity to a goal of 5 days per week for 30 minutes of MODERATE intensity PLUS 2 days per week of FULL BODY resistance training  3990-7928 calories per day  5-10 servings of fruits and vegetables per day  100 grams of protein per day      Follow up in approximately 2 months with Non-Surgical Physician/Advanced Practitioner  Diagnoses and all orders for this visit:    Abnormal weight gain  Comments:  see plan under Class 1 obesity    Obesity, Class I, BMI 30-34 9  -     Semaglutide-Weight Management (WEGOVY) 0 25 MG/0 5ML;  Inject 0 5 mL (0 25 mg total) under the skin once a week for 4 doses    Benign essential hypertension    Prediabetes  -     Semaglutide-Weight Management (WEGOVY) 0 25 MG/0 5ML; Inject 0 5 mL (0 25 mg total) under the skin once a week for 4 doses          Subjective:   Chief Complaint   Patient presents with    Follow-up     MWM OD f/u; waist-39in        Patient ID: Ronna Colunga  is a 63 y o  female with excess weight/obesity here to pursue weight managment  Patient is pursuing Conservative Program      HPI Patient presents for MWM follow up  Currently taking Saxenda but does not feel it is working for her  She also reports her BP has been running the past week  HAs an appt with PCP on Wednesday to follow up  Under more stress recently, reports caring for her  3 grandchildren    -denies food logging, not measuring portions  -Hydration: 4-7  Bottles of water, unsweetened green tea, seltzer water  -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  -Sleep: very broken sleep due to past trauma, uses trazodone  -ETOH: 2 drink per week or less  -Cravings: struggles with sweets, snacks on SF Jello    B: 2 Hardboiled eggs + dela cruz or turkey sausage  S: apple  L:  Can of chicken noodle soup  S:  Nuts/cheese/dried fruit pack  D: salad + grilled chicken + balsalmic OR bunless veggie burger + cheese  S: celery/kurt/apple/water     Wt Readings from Last 10 Encounters:   05/01/23 97 1 kg (214 lb)   04/18/23 97 8 kg (215 lb 9 6 oz)   02/24/23 97 5 kg (215 lb)   02/09/23 97 5 kg (215 lb)   12/30/22 98 5 kg (217 lb 3 2 oz)   12/22/22 100 kg (221 lb)   11/28/22 90 7 kg (200 lb)   09/21/22 95 3 kg (210 lb)   08/24/22 93 4 kg (206 lb)   06/15/22 86 2 kg (190 lb)          The following portions of the patient's history were reviewed and updated as appropriate: allergies, current medications, past family history, past medical history, past social history, past surgical history and problem list     Review of Systems   Respiratory: Negative  Cardiovascular: Negative      Gastrointestinal: "Negative  Objective:    /99   Pulse 99   Resp 16   Ht 5' 10\" (1 778 m)   Wt 97 1 kg (214 lb)   LMP  (LMP Unknown)   BMI 30 71 kg/m²      Physical Exam  Vitals and nursing note reviewed  Constitutional   General appearance: Abnormal   well developed and obese  Eyes No conjunctival pallor  Ears, Nose, Mouth, and Throat Oral mucosa moist    Pulmonary   Respiratory effort: No increased work of breathing or signs of respiratory distress  Auscultation of lungs: Clear to auscultation, equal breath sounds bilaterally, no wheezes, no rales, no rhonci  Cardiovascular   Auscultation of heart: Normal rate and rhythm, normal S1 and S2, without murmurs  Examination of extremities for edema and/or varicosities: Normal   no edema  Abdomen   Abdomen: Abnormal   The abdomen was obese  Bowel sounds were normal  The abdomen was soft and nontender     Musculoskeletal   Gait and station: Normal     Psychiatric   Orientation to person, place and time: Normal     Affect: appropriate    "

## 2023-05-03 ENCOUNTER — OFFICE VISIT (OUTPATIENT)
Dept: INTERNAL MEDICINE CLINIC | Age: 63
End: 2023-05-03

## 2023-05-03 VITALS
HEIGHT: 70 IN | SYSTOLIC BLOOD PRESSURE: 122 MMHG | TEMPERATURE: 97.7 F | WEIGHT: 216 LBS | OXYGEN SATURATION: 99 % | BODY MASS INDEX: 30.92 KG/M2 | DIASTOLIC BLOOD PRESSURE: 80 MMHG | HEART RATE: 72 BPM

## 2023-05-03 DIAGNOSIS — F41.1 GAD (GENERALIZED ANXIETY DISORDER): ICD-10-CM

## 2023-05-03 DIAGNOSIS — F32.0 CURRENT MILD EPISODE OF MAJOR DEPRESSIVE DISORDER WITHOUT PRIOR EPISODE (HCC): Primary | ICD-10-CM

## 2023-05-03 DIAGNOSIS — R73.03 PREDIABETES: ICD-10-CM

## 2023-05-03 DIAGNOSIS — Z12.31 ENCOUNTER FOR SCREENING MAMMOGRAM FOR MALIGNANT NEOPLASM OF BREAST: ICD-10-CM

## 2023-05-03 DIAGNOSIS — R03.0 ELEVATED BP WITHOUT DIAGNOSIS OF HYPERTENSION: ICD-10-CM

## 2023-05-03 RX ORDER — BUPROPION HYDROCHLORIDE 150 MG/1
150 TABLET ORAL EVERY MORNING
Qty: 30 TABLET | Refills: 3 | Status: SHIPPED | OUTPATIENT
Start: 2023-05-03 | End: 2023-10-30

## 2023-05-03 NOTE — PROGRESS NOTES
Assessment/Plan:         Diagnoses and all orders for this visit:    Current mild episode of major depressive disorder without prior episode ([facility] [address] )  Comments:  +wellbutrin, f/u in 1month  report any SE    Orders:  -     buPROPion (WELLBUTRIN XL) 150 mg 24 hr tablet; Take 1 tablet (150 mg total) by mouth every morning    Encounter for screening mammogram for malignant neoplasm of breast  -     Mammo screening bilateral w 3d & cad; Future    GERARD (generalized anxiety disorder)  Comments:  situational - currently active  pt wishes to avoid ssri due to weight gain potential     Elevated BP without diagnosis of hypertension  Comments:  continue to monitor at home  improved off saxenda    Prediabetes  Comments:  hba1c 5 7  currently on wegovy - just started  continue low calorie diet           Subjective:      Patient ID: Darion Pennington is a 63 y o  female  62 y/o female with hx of anxiety, obesity presents for f/u   Pt states she was on saxenda and noted elevated BP, discussed with weight management and was changed to wegovy and feels much better  BP well controlled now    Pt states she has been feeling more anxious and feeling depressed, not wanting to go out and do things  She states her dgt recently moved in with her kids and she feels that she has been snapping at her family  She has not been taking anything recently   She does take trazodone at bedtime           The following portions of the patient's history were reviewed and updated as appropriate: allergies, current medications, past family history, past medical history, past social history, past surgical history and problem list     Review of Systems   Constitutional: Negative for activity change, appetite change, chills, diaphoresis, fatigue and fever  HENT: Negative for congestion and sore throat  Eyes: Negative for pain and visual disturbance  Respiratory: Negative for cough, shortness of breath and wheezing      Cardiovascular: Negative for chest pain and leg swelling  Gastrointestinal: Negative for abdominal pain, constipation, diarrhea and nausea  Musculoskeletal: Negative for arthralgias and back pain  Skin: Negative for rash  Neurological: Negative for dizziness, weakness, light-headedness and headaches  Hematological: Does not bruise/bleed easily  Psychiatric/Behavioral: Positive for dysphoric mood  Negative for sleep disturbance  The patient is not nervous/anxious  History reviewed  No pertinent past medical history        Current Outpatient Medications:     albuterol (ProAir HFA) 90 mcg/act inhaler, Inhale 2 puffs every 6 (six) hours as needed for wheezing, Disp: 18 g, Rfl: 0    buPROPion (WELLBUTRIN XL) 150 mg 24 hr tablet, Take 1 tablet (150 mg total) by mouth every morning, Disp: 30 tablet, Rfl: 3    CALCIUM PO, Take by mouth, Disp: , Rfl:     Cholecalciferol (VITAMIN D-3 PO), Take by mouth, Disp: , Rfl:     Multiple Vitamins-Minerals (MULTIVITAMIN WITH MINERALS) tablet, Take 1 tablet by mouth daily, Disp: , Rfl:     Semaglutide-Weight Management (WEGOVY) 0 25 MG/0 5ML, Inject 0 5 mL (0 25 mg total) under the skin once a week for 4 doses, Disp: 2 mL, Rfl: 0    traZODone (DESYREL) 100 mg tablet, Take 1 tablet (100 mg total) by mouth daily at bedtime, Disp: 90 tablet, Rfl: 1    Allergies   Allergen Reactions    Other Itching and Rash     Dust, bugs        Social History   Past Surgical History:   Procedure Laterality Date    ABLATION COLPOCLESIS      TUBAL LIGATION       Family History   Problem Relation Age of Onset    No Known Problems Mother     Diabetes Father     Glaucoma Father     No Known Problems Sister     No Known Problems Daughter     Breast cancer Maternal Grandmother         unknown age   Carlene Barry No Known Problems Maternal Grandfather     Breast cancer Paternal Grandmother         unknown age   Carlene Barry No Known Problems Paternal Grandfather     No Known Problems Son     No Known Problems Sister     No Known "Problems Paternal Aunt        Objective:  /80 (BP Location: Left arm, Patient Position: Sitting, Cuff Size: Large)   Pulse 72   Temp 97 7 °F (36 5 °C) (Temporal)   Ht 5' 10\" (1 778 m)   Wt 98 kg (216 lb)   LMP  (LMP Unknown)   SpO2 99%   BMI 30 99 kg/m²        Physical Exam  Vitals reviewed  Constitutional:       General: She is not in acute distress  HENT:      Head: Normocephalic and atraumatic  Right Ear: Tympanic membrane, ear canal and external ear normal       Left Ear: Tympanic membrane, ear canal and external ear normal       Nose: Nose normal    Eyes:      General:         Right eye: No discharge  Left eye: No discharge  Extraocular Movements: Extraocular movements intact  Conjunctiva/sclera: Conjunctivae normal       Pupils: Pupils are equal, round, and reactive to light  Cardiovascular:      Rate and Rhythm: Normal rate and regular rhythm  Pulmonary:      Effort: Pulmonary effort is normal  No respiratory distress  Breath sounds: Normal breath sounds  No wheezing, rhonchi or rales  Abdominal:      General: Bowel sounds are normal  There is no distension  Musculoskeletal:         General: Normal range of motion  Cervical back: Normal range of motion  Right lower leg: No edema  Left lower leg: No edema  Lymphadenopathy:      Cervical: No cervical adenopathy  Neurological:      General: No focal deficit present  Mental Status: She is alert and oriented to person, place, and time     Psychiatric:         Mood and Affect: Mood normal          "

## 2023-05-26 ENCOUNTER — TELEPHONE (OUTPATIENT)
Dept: BARIATRICS | Facility: CLINIC | Age: 63
End: 2023-05-26

## 2023-05-26 DIAGNOSIS — E66.9 OBESITY, CLASS I, BMI 30-34.9: Primary | ICD-10-CM

## 2023-05-26 NOTE — TELEPHONE ENCOUNTER
Patient is calling asking for the next dose of Wegovy  She states she tolerated the previous dose without incident

## 2023-05-30 DIAGNOSIS — F32.0 CURRENT MILD EPISODE OF MAJOR DEPRESSIVE DISORDER WITHOUT PRIOR EPISODE (HCC): ICD-10-CM

## 2023-05-30 RX ORDER — BUPROPION HYDROCHLORIDE 300 MG/1
300 TABLET ORAL EVERY MORNING
Qty: 90 TABLET | Refills: 1 | Status: SHIPPED | OUTPATIENT
Start: 2023-05-30 | End: 2023-11-26

## 2023-05-30 NOTE — TELEPHONE ENCOUNTER
Please let patient know there is a shortage and not expected to be back in stock until Sept  Does she want to consider any other medication In the mean time? Please ask patient if she has any history of kidney stones, glaucoma or seizure disorder?

## 2023-05-31 ENCOUNTER — TELEPHONE (OUTPATIENT)
Dept: BARIATRICS | Facility: CLINIC | Age: 63
End: 2023-05-31

## 2023-05-31 DIAGNOSIS — E66.9 OBESITY, CLASS I, BMI 30-34.9: Primary | ICD-10-CM

## 2023-05-31 NOTE — TELEPHONE ENCOUNTER
I put in for her to restart saxenda   I would still titrate the medication up weekly since she has been off it

## 2023-05-31 NOTE — TELEPHONE ENCOUNTER
PT called saying she called over 20 Pharm and no Wegovy asking to be put back on Saxenda  For the meantime  PT also states if she could be started on the highest dose of Saxenda, she was on before as well

## 2023-05-31 NOTE — TELEPHONE ENCOUNTER
Patient called back stating that [facility] had the [facility] JUDIT BARBOSA in stock  I gave them a verbal for the [facility]DAVID BARBOSA  I just need you to cancel the JÄRVENPÄÄ script  I also need for you to send an electronic script to [facility], the pharm is in her chart

## 2023-06-09 ENCOUNTER — TELEPHONE (OUTPATIENT)
Dept: BARIATRICS | Facility: CLINIC | Age: 63
End: 2023-06-09

## 2023-06-09 ENCOUNTER — CLINICAL SUPPORT (OUTPATIENT)
Dept: BARIATRICS | Facility: CLINIC | Age: 63
End: 2023-06-09

## 2023-06-09 VITALS
HEIGHT: 70 IN | WEIGHT: 212.8 LBS | BODY MASS INDEX: 30.46 KG/M2 | DIASTOLIC BLOOD PRESSURE: 80 MMHG | RESPIRATION RATE: 17 BRPM | SYSTOLIC BLOOD PRESSURE: 128 MMHG | HEART RATE: 73 BPM

## 2023-06-09 DIAGNOSIS — E66.9 OBESITY, CLASS I, BMI 30-34.9: Primary | ICD-10-CM

## 2023-06-09 DIAGNOSIS — R63.5 ABNORMAL WEIGHT GAIN: Primary | ICD-10-CM

## 2023-06-09 PROCEDURE — RECHECK

## 2023-06-09 RX ORDER — OMEGA-3-ACID ETHYL ESTERS 1 G/1
1 CAPSULE, LIQUID FILLED ORAL 2 TIMES DAILY
COMMUNITY

## 2023-06-09 NOTE — TELEPHONE ENCOUNTER
I sent the 1mg dose to [facility]   I just want to make sure she got the 0 5mg dose we sent to [facility] on 5/31

## 2023-06-09 NOTE — TELEPHONE ENCOUNTER
Pt is asking for the next dose of Wegovy, Previous dose she had no issues  Asking to send script to [id number]

## 2023-06-09 NOTE — PROGRESS NOTES
- Is BP less than 100/60? No  - Is BP greater than 140/90? NO  - Is HR greater than 100? NO  **If yes to any of the above, have patient relax and repeat in 5-10 minutes**    Repeat values:    - Is BP less than 100/60?  - Is BP greater than 140/90?  - Is HR greater than 100?   **If values remain outside of ranges above, please consult provider for next steps**

## 2023-06-26 ENCOUNTER — OFFICE VISIT (OUTPATIENT)
Dept: OBGYN CLINIC | Facility: HOSPITAL | Age: 63
End: 2023-06-26
Payer: COMMERCIAL

## 2023-06-26 VITALS
SYSTOLIC BLOOD PRESSURE: 152 MMHG | HEART RATE: 69 BPM | DIASTOLIC BLOOD PRESSURE: 94 MMHG | BODY MASS INDEX: 30.26 KG/M2 | HEIGHT: 70 IN | WEIGHT: 211.4 LBS

## 2023-06-26 DIAGNOSIS — M25.562 ACUTE PAIN OF LEFT KNEE: Primary | ICD-10-CM

## 2023-06-26 PROCEDURE — 20610 DRAIN/INJ JOINT/BURSA W/O US: CPT | Performed by: ORTHOPAEDIC SURGERY

## 2023-06-26 PROCEDURE — 99213 OFFICE O/P EST LOW 20 MIN: CPT | Performed by: ORTHOPAEDIC SURGERY

## 2023-06-26 RX ORDER — METHYLPREDNISOLONE ACETATE 40 MG/ML
2 INJECTION, SUSPENSION INTRA-ARTICULAR; INTRALESIONAL; INTRAMUSCULAR; SOFT TISSUE
Status: COMPLETED | OUTPATIENT
Start: 2023-06-26 | End: 2023-06-26

## 2023-06-26 RX ORDER — BUPIVACAINE HYDROCHLORIDE 2.5 MG/ML
4 INJECTION, SOLUTION INFILTRATION; PERINEURAL
Status: COMPLETED | OUTPATIENT
Start: 2023-06-26 | End: 2023-06-26

## 2023-06-26 RX ADMIN — METHYLPREDNISOLONE ACETATE 2 ML: 40 INJECTION, SUSPENSION INTRA-ARTICULAR; INTRALESIONAL; INTRAMUSCULAR; SOFT TISSUE at 17:00

## 2023-06-26 RX ADMIN — BUPIVACAINE HYDROCHLORIDE 4 ML: 2.5 INJECTION, SOLUTION INFILTRATION; PERINEURAL at 17:00

## 2023-06-26 NOTE — PROGRESS NOTES
"Assessment/Plan:    Acute pain of left knee  We are going to see how she does with the injection  If she still has a lot of pain we may get an MRI of the knee  We will see her back  Diagnoses and all orders for this visit:    Acute pain of left knee  -     Large joint arthrocentesis: L knee          Subjective:      Patient ID: Sebastián Hunter is a 63 y o  female  This is a 57-year-old woman who has this ongoing knee pain  She describes the pain as being specifically along the medial joint line  It is bothering her a significant amount  She now follows up  The following portions of the patient's history were reviewed and updated as appropriate: allergies, current medications, past family history, past medical history, past social history, past surgical history, and problem list     Review of Systems      Objective:      /94   Pulse 69   Ht 5' 10\" (1 778 m)   Wt 95 9 kg (211 lb 6 4 oz)   LMP  (LMP Unknown)   BMI 30 33 kg/m²          Physical Exam      On physical examination she has full active and passive range of motion of the knee  She specifically has tenderness along the medial joint line  Her alignment looks excellent  Skin is intact compartments are soft  No signs of effusion  No signs of other problems  She has a positive Eulalia's sign  Large joint arthrocentesis: L knee  Universal Protocol:  Procedure performed by:  Consent: Verbal consent obtained  Written consent not obtained    Risks and benefits: risks, benefits and alternatives were discussed  Consent given by: patient  Timeout called at: 6/26/2023 5:11 PM   Patient understanding: patient states understanding of the procedure being performed    Supporting Documentation  Indications: pain   Procedure Details  Location: knee - L knee  Needle size: 22 G  Ultrasound guidance: no  Approach: anterolateral  Medications administered: 4 mL bupivacaine 0 25 %; 2 mL methylPREDNISolone acetate 40 mg/mL          "

## 2023-06-26 NOTE — ASSESSMENT & PLAN NOTE
We are going to see how she does with the injection  If she still has a lot of pain we may get an MRI of the knee  We will see her back

## 2023-06-29 ENCOUNTER — TELEPHONE (OUTPATIENT)
Dept: BARIATRICS | Facility: CLINIC | Age: 63
End: 2023-06-29

## 2023-06-29 DIAGNOSIS — E66.9 OBESITY, CLASS I, BMI 30-34.9: Primary | ICD-10-CM

## 2023-06-29 NOTE — TELEPHONE ENCOUNTER
Patient is calling asking for the next dosage of Wegovy  She has tolerated the previous dose without incident

## 2023-06-30 ENCOUNTER — TELEPHONE (OUTPATIENT)
Dept: BARIATRICS | Facility: CLINIC | Age: 63
End: 2023-06-30

## 2023-06-30 DIAGNOSIS — E66.9 OBESITY, CLASS I, BMI 30-34.9: ICD-10-CM

## 2023-07-22 ENCOUNTER — HOSPITAL ENCOUNTER (OUTPATIENT)
Dept: RADIOLOGY | Age: 63
Discharge: HOME | End: 2023-07-22
Payer: COMMERCIAL

## 2023-07-22 DIAGNOSIS — Z12.31 ENCOUNTER FOR SCREENING MAMMOGRAM FOR MALIGNANT NEOPLASM OF BREAST: ICD-10-CM

## 2023-07-22 PROCEDURE — 77063 BREAST TOMOSYNTHESIS BI: CPT

## 2023-07-22 PROCEDURE — 77067 SCR MAMMO BI INCL CAD: CPT

## 2023-07-24 DIAGNOSIS — E66.9 OBESITY, CLASS I, BMI 30-34.9: ICD-10-CM

## 2023-07-25 DIAGNOSIS — E66.9 OBESITY, CLASS I, BMI 30-34.9: Primary | ICD-10-CM

## 2023-07-25 RX ORDER — SEMAGLUTIDE 1.7 MG/.75ML
INJECTION, SOLUTION SUBCUTANEOUS
Qty: 3 ML | Refills: 0 | OUTPATIENT
Start: 2023-07-25

## 2023-07-27 DIAGNOSIS — E66.9 OBESITY, CLASS I, BMI 30-34.9: ICD-10-CM

## 2023-08-11 ENCOUNTER — OFFICE VISIT (OUTPATIENT)
Dept: BARIATRICS | Facility: CLINIC | Age: 63
End: 2023-08-11
Payer: COMMERCIAL

## 2023-08-11 VITALS
HEIGHT: 70 IN | RESPIRATION RATE: 17 BRPM | DIASTOLIC BLOOD PRESSURE: 85 MMHG | BODY MASS INDEX: 28.92 KG/M2 | HEART RATE: 77 BPM | WEIGHT: 202 LBS | SYSTOLIC BLOOD PRESSURE: 125 MMHG

## 2023-08-11 DIAGNOSIS — R73.03 PRE-DIABETES: ICD-10-CM

## 2023-08-11 DIAGNOSIS — E55.9 VITAMIN D DEFICIENCY: ICD-10-CM

## 2023-08-11 DIAGNOSIS — R73.03 PREDIABETES: ICD-10-CM

## 2023-08-11 DIAGNOSIS — E66.9 OBESITY, CLASS I, BMI 30-34.9: ICD-10-CM

## 2023-08-11 DIAGNOSIS — E66.3 OVERWEIGHT: Primary | ICD-10-CM

## 2023-08-11 PROCEDURE — 99214 OFFICE O/P EST MOD 30 MIN: CPT | Performed by: PHYSICIAN ASSISTANT

## 2023-08-11 RX ORDER — SEMAGLUTIDE 1 MG/.5ML
1 INJECTION, SOLUTION SUBCUTANEOUS
COMMUNITY
Start: 2023-07-22 | End: 2023-08-11

## 2023-08-11 NOTE — PROGRESS NOTES
Assessment/Plan:    Overweight  -Patient is pursuing Conservative Program  -Initial weight loss goal of 5-10% weight loss for improved health  -Screening labs: up to date 4/26/23    -Dietary recall reviewed. Discussed making sure to get a serving of protein in the AM and she will continue orgain shakes  -recommend continuing to walk for exercise and encouraged to start using peloton again.   -continue with increased water intake    Initial: 217.2 lbs  Last visit: 214 lbs  Current: 202 lbs  Change: -15.2 lbs (-12 lb from last OV)  Goal: 160 lbs    Currently on wegovy 2.4mg  Has lost 5.6 % body weight. Denies hx Pancreatitis or family hx MEN2 or MTC. SE profile reviewed. medication agreement signed  On wellbutrin from PCP also  Prior medications:-Started on [address] [facility] 1/9/2023 but did not meet requirements to continue and did not feel much effect from the medication. Also on phentermine prior    Prediabetes  Last A1C 5.7. will recheck with weight loss and wegovy        Follow up in approximately 4 months with Non-Surgical Physician/Advanced Practitioner. Diagnoses and all orders for this visit:    Overweight  -     Hemoglobin A1C; Future  -     Vitamin D 25 hydroxy; Future  -     Vitamin B12; Future    Pre-diabetes  -     Hemoglobin A1C; Future    Vitamin D deficiency  -     Vitamin D 25 hydroxy; Future    Obesity, Class I, BMI 30-34.9  -     Semaglutide-Weight Management (WEGOVY) 2.4 MG/0.75ML; Inject 0.75 mL (2.4 mg total) under the skin once a week    Prediabetes    Other orders  -     Discontinue: Wegovy 1 MG/0.5ML; 1 mL          Subjective:   Chief Complaint   Patient presents with   • Follow-up     MWM 4mf/u; waist-38.5in        Patient ID: Italia Pugh  is a 63 y.o. female with excess weight/obesity here to pursue weight managment. Patient is pursuing Conservative Program.     HPI  She is taking wegovy 2.4 mg . She has had some mild nausea. She does feel it is helping with appetite control.  She is trying ot stay active and increase her water intake. In the past she was doing b12 injections and is unsure if it is low or if she wouuld benefit from them    Wt Readings from Last 10 Encounters:   08/11/23 91.6 kg (202 lb)   06/26/23 95.9 kg (211 lb 6.4 oz)   06/09/23 96.5 kg (212 lb 12.8 oz)   05/03/23 98 kg (216 lb)   05/01/23 97.1 kg (214 lb)   04/18/23 97.8 kg (215 lb 9.6 oz)   02/24/23 97.5 kg (215 lb)   02/09/23 97.5 kg (215 lb)   12/30/22 98.5 kg (217 lb 3.2 oz)   12/22/22 100 kg (221 lb)       Food logging:not currently  Increased appetite/cravings:controlled  Exercise:walking regularly, has peloton but not using currently  Hydration:at least 64 oz water, has cut back on alcohol intake    Colonoscopy-ordered prior    Diet Recall:  B:   S:orgain shake  L:salad  W/protein w/balsamic  D: sometiems has shake or chicken w/veggies    The following portions of the patient's history were reviewed and updated as appropriate:   She  has no past medical history on file. She   Patient Active Problem List    Diagnosis Date Noted   • Overweight 12/30/2022   • Prediabetes 12/30/2022   • Bursitis of left knee 07/23/2021   • Acute pain of left knee 07/23/2021   • COVID-19 12/01/2020   • Benign essential hypertension 12/04/2014   • Anxiety state 02/20/2014   • Leiomyoma of uterus 11/21/2013     She  has a past surgical history that includes Tubal ligation and Ablation colpoclesis. Her family history includes Breast cancer in her maternal grandmother and paternal grandmother; Diabetes in her father; Glaucoma in her father; No Known Problems in her daughter, maternal grandfather, mother, paternal aunt, paternal grandfather, sister, sister, and son. She  reports that she has never smoked. She has never used smokeless tobacco. She reports current alcohol use of about 1.0 standard drink of alcohol per week. She reports that she does not use drugs.   Current Outpatient Medications   Medication Sig Dispense Refill   • albuterol (ProAir HFA) 90 mcg/act inhaler Inhale 2 puffs every 6 (six) hours as needed for wheezing 18 g 0   • buPROPion (WELLBUTRIN XL) 300 mg 24 hr tablet Take 1 tablet (300 mg total) by mouth every morning 90 tablet 1   • CALCIUM PO Take by mouth     • Cholecalciferol (VITAMIN D-3 PO) Take by mouth     • Multiple Vitamins-Minerals (MULTIVITAMIN WITH MINERALS) tablet Take 1 tablet by mouth daily     • omega-3-acid ethyl esters (LOVAZA) 1 g capsule Take 1 g by mouth 2 (two) times a day     • Semaglutide-Weight Management (WEGOVY) 2.4 MG/0.75ML Inject 0.75 mL (2.4 mg total) under the skin once a week 3 mL 3   • traZODone (DESYREL) 100 mg tablet Take 1 tablet (100 mg total) by mouth daily at bedtime 90 tablet 1     No current facility-administered medications for this visit. Current Outpatient Medications on File Prior to Visit   Medication Sig   • albuterol (ProAir HFA) 90 mcg/act inhaler Inhale 2 puffs every 6 (six) hours as needed for wheezing   • buPROPion (WELLBUTRIN XL) 300 mg 24 hr tablet Take 1 tablet (300 mg total) by mouth every morning   • CALCIUM PO Take by mouth   • Cholecalciferol (VITAMIN D-3 PO) Take by mouth   • Multiple Vitamins-Minerals (MULTIVITAMIN WITH MINERALS) tablet Take 1 tablet by mouth daily   • omega-3-acid ethyl esters (LOVAZA) 1 g capsule Take 1 g by mouth 2 (two) times a day   • traZODone (DESYREL) 100 mg tablet Take 1 tablet (100 mg total) by mouth daily at bedtime   • [DISCONTINUED] Semaglutide-Weight Management (WEGOVY) 2.4 MG/0.75ML Inject 0.75 mL (2.4 mg total) under the skin once a week   • [DISCONTINUED] Wegovy 1 MG/0.5ML 1 mL     No current facility-administered medications on file prior to visit. She is allergic to other. .    Review of Systems   Constitutional: Negative for fever. Respiratory: Negative for shortness of breath. Cardiovascular: Negative for chest pain and palpitations. Gastrointestinal: Negative for abdominal pain, constipation, diarrhea and vomiting. Genitourinary: Negative for difficulty urinating. Musculoskeletal: Negative for arthralgias and back pain. Skin: Negative for rash. Neurological: Negative for headaches. Psychiatric/Behavioral: Negative for dysphoric mood. The patient is not nervous/anxious. Objective:    /85   Pulse 77   Resp 17   Ht 5' 10" (1.778 m)   Wt 91.6 kg (202 lb)   LMP  (LMP Unknown)   BMI 28.98 kg/m²      Physical Exam  Vitals and nursing note reviewed. Constitutional:       General: She is not in acute distress. Appearance: She is well-developed. Comments: Overweight     HENT:      Head: Normocephalic and atraumatic. Eyes:      Conjunctiva/sclera: Conjunctivae normal.   Neck:      Thyroid: No thyromegaly. Pulmonary:      Effort: Pulmonary effort is normal. No respiratory distress. Skin:     Findings: No rash (visible). Neurological:      Mental Status: She is alert and oriented to person, place, and time.    Psychiatric:         Mood and Affect: Mood normal.         Behavior: Behavior normal.

## 2023-08-11 NOTE — ASSESSMENT & PLAN NOTE
-Patient is pursuing Conservative Program  -Initial weight loss goal of 5-10% weight loss for improved health  -Screening labs: up to date 4/26/23    -Dietary recall reviewed. Discussed making sure to get a serving of protein in the AM and she will continue orgain shakes  -recommend continuing to walk for exercise and encouraged to start using peloton again.   -continue with increased water intake    Initial: 217.2 lbs  Last visit: 214 lbs  Current: 202 lbs  Change: -15.2 lbs (-12 lb from last OV)  Goal: 160 lbs    Currently on wegovy 2.4mg  Has lost 5.6 % body weight. Denies hx Pancreatitis or family hx MEN2 or MTC. SE profile reviewed. medication agreement signed  On wellbutrin from PCP also  Prior medications:-Started on [address] [facility] 1/9/2023 but did not meet requirements to continue and did not feel much effect from the medication.  Also on phentermine prior

## 2023-08-17 DIAGNOSIS — F51.01 PRIMARY INSOMNIA: ICD-10-CM

## 2023-08-17 RX ORDER — TRAZODONE HYDROCHLORIDE 100 MG/1
100 TABLET ORAL
Qty: 90 TABLET | Refills: 1 | Status: SHIPPED | OUTPATIENT
Start: 2023-08-17

## 2023-08-17 RX ORDER — TRAZODONE HYDROCHLORIDE 100 MG/1
100 TABLET ORAL
Qty: 90 TABLET | Refills: 1 | Status: CANCELLED | OUTPATIENT
Start: 2023-08-17

## 2023-10-24 DIAGNOSIS — F51.01 PRIMARY INSOMNIA: ICD-10-CM

## 2023-10-24 RX ORDER — TRAZODONE HYDROCHLORIDE 100 MG/1
100 TABLET ORAL
Qty: 90 TABLET | Refills: 1 | Status: CANCELLED | OUTPATIENT
Start: 2023-10-24

## 2023-11-10 ENCOUNTER — IMMUNIZATIONS (OUTPATIENT)
Dept: INTERNAL MEDICINE CLINIC | Age: 63
End: 2023-11-10
Payer: COMMERCIAL

## 2023-11-10 DIAGNOSIS — Z23 ENCOUNTER FOR IMMUNIZATION: Primary | ICD-10-CM

## 2023-11-10 PROCEDURE — 90686 IIV4 VACC NO PRSV 0.5 ML IM: CPT

## 2023-11-10 PROCEDURE — 90471 IMMUNIZATION ADMIN: CPT

## 2023-11-22 DIAGNOSIS — I10 BENIGN ESSENTIAL HYPERTENSION: ICD-10-CM

## 2023-11-22 DIAGNOSIS — E66.3 OVERWEIGHT: Primary | ICD-10-CM

## 2023-11-22 RX ORDER — SEMAGLUTIDE 2.4 MG/.75ML
INJECTION, SOLUTION SUBCUTANEOUS
Qty: 3 ML | Refills: 0 | Status: SHIPPED | OUTPATIENT
Start: 2023-11-22

## 2023-11-22 NOTE — TELEPHONE ENCOUNTER
Patient called requesting a refill of wegovy to be sent to the [facility] in [facility].

## 2023-12-29 DIAGNOSIS — F51.01 PRIMARY INSOMNIA: ICD-10-CM

## 2023-12-29 DIAGNOSIS — F32.0 CURRENT MILD EPISODE OF MAJOR DEPRESSIVE DISORDER WITHOUT PRIOR EPISODE (HCC): ICD-10-CM

## 2023-12-29 RX ORDER — BUPROPION HYDROCHLORIDE 300 MG/1
300 TABLET ORAL EVERY MORNING
Qty: 90 TABLET | Refills: 1 | Status: SHIPPED | OUTPATIENT
Start: 2023-12-29 | End: 2024-06-26

## 2023-12-29 RX ORDER — TRAZODONE HYDROCHLORIDE 100 MG/1
100 TABLET ORAL
Qty: 90 TABLET | Refills: 1 | Status: SHIPPED | OUTPATIENT
Start: 2023-12-29

## 2024-01-25 ENCOUNTER — OFFICE VISIT (OUTPATIENT)
Dept: INTERNAL MEDICINE CLINIC | Age: 64
End: 2024-01-25
Payer: COMMERCIAL

## 2024-01-25 VITALS
HEART RATE: 74 BPM | SYSTOLIC BLOOD PRESSURE: 120 MMHG | WEIGHT: 193.2 LBS | OXYGEN SATURATION: 99 % | DIASTOLIC BLOOD PRESSURE: 74 MMHG | HEIGHT: 70 IN | TEMPERATURE: 97.8 F | BODY MASS INDEX: 27.66 KG/M2

## 2024-01-25 DIAGNOSIS — R05.1 ACUTE COUGH: ICD-10-CM

## 2024-01-25 DIAGNOSIS — F51.01 PRIMARY INSOMNIA: ICD-10-CM

## 2024-01-25 DIAGNOSIS — L30.9 DERMATITIS: ICD-10-CM

## 2024-01-25 DIAGNOSIS — J20.8 ACUTE BRONCHITIS DUE TO OTHER SPECIFIED ORGANISMS: Primary | ICD-10-CM

## 2024-01-25 DIAGNOSIS — Z12.11 SCREENING FOR MALIGNANT NEOPLASM OF COLON: ICD-10-CM

## 2024-01-25 PROCEDURE — 99214 OFFICE O/P EST MOD 30 MIN: CPT | Performed by: PHYSICIAN ASSISTANT

## 2024-01-25 RX ORDER — PREDNISONE 20 MG/1
20 TABLET ORAL DAILY
Qty: 5 TABLET | Refills: 0 | Status: SHIPPED | OUTPATIENT
Start: 2024-01-25

## 2024-01-25 RX ORDER — KETOCONAZOLE 20 MG/G
CREAM TOPICAL DAILY
Qty: 60 G | Refills: 0 | Status: SHIPPED | OUTPATIENT
Start: 2024-01-25

## 2024-01-25 RX ORDER — TRAZODONE HYDROCHLORIDE 100 MG/1
150 TABLET ORAL
Qty: 135 TABLET | Refills: 3 | Status: SHIPPED | OUTPATIENT
Start: 2024-01-25

## 2024-01-25 NOTE — PROGRESS NOTES
Assessment/Plan:         Diagnoses and all orders for this visit:    Acute bronchitis due to other specified organisms  Comments:  short pred course - take in am with food  if cough not resolving go for CXR  Orders:  -     predniSONE 20 mg tablet; Take 1 tablet (20 mg total) by mouth daily    Screening for malignant neoplasm of colon  -     Cologuard    Primary insomnia  Comments:  increase trazodone to 150mg qhs  Orders:  -     traZODone (DESYREL) 100 mg tablet; Take 1.5 tablets (150 mg total) by mouth daily at bedtime    Acute cough  -     XR chest pa & lateral; Future    Dermatitis  Comments:  ketoconazlole daily  f/u in 1-2 weeks if not resolved  Orders:  -     ketoconazole (NIZORAL) 2 % cream; Apply topically daily          Subjective:      Patient ID: Ibis Schmid is a 63 y.o. female.    64 y/o female with hx of insomnia, depression presents for f/u and itching in R low back   Pt states she was sitting and felt a pinch in her back and started feeling itchy  Put alcohol on it and states this helped but the rash has been spreading and continues to itch   Ongoing for 2-3 weeks  Has not spread anywhere else  No new soaps or detergents     Pt reports mild cough,usually dry but at times has been productive  Pt reports on and off for the past few months  States every one is sick now at work, multiple covid contacts   Pt did home test which was negative  No fever/chills     Pt reports insomnia improved with trazodone but often wakes up and can't go back to sleep  Pt would like to increase dose slightly    Cough  This is a new problem. The current episode started in the past 7 days. The problem has been gradually improving. The problem occurs every few hours. Associated symptoms include postnasal drip and a rash. Pertinent negatives include no chest pain, chills, eye redness, fever, headaches, shortness of breath or wheezing. She has tried OTC cough suppressant for the symptoms. The treatment provided mild relief.        The following portions of the patient's history were reviewed and updated as appropriate: allergies, current medications, past family history, past medical history, past social history, past surgical history, and problem list.    Review of Systems   Constitutional:  Negative for activity change, appetite change, chills, diaphoresis and fever.   HENT:  Positive for postnasal drip. Negative for congestion, sinus pressure, sinus pain and sneezing.    Eyes:  Negative for pain and redness.   Respiratory:  Positive for cough. Negative for shortness of breath and wheezing.    Cardiovascular:  Negative for chest pain and leg swelling.   Gastrointestinal:  Negative for abdominal pain, constipation, diarrhea and nausea.   Musculoskeletal:  Negative for arthralgias and back pain.   Skin:  Positive for rash. Negative for pallor.   Neurological:  Negative for dizziness and headaches.   Psychiatric/Behavioral:  Negative for sleep disturbance. The patient is not nervous/anxious.          History reviewed. No pertinent past medical history.      Current Outpatient Medications:     albuterol (ProAir HFA) 90 mcg/act inhaler, Inhale 2 puffs every 6 (six) hours as needed for wheezing, Disp: 18 g, Rfl: 0    buPROPion (WELLBUTRIN XL) 300 mg 24 hr tablet, Take 1 tablet (300 mg total) by mouth every morning, Disp: 90 tablet, Rfl: 1    CALCIUM PO, Take by mouth, Disp: , Rfl:     Cholecalciferol (VITAMIN D-3 PO), Take by mouth, Disp: , Rfl:     ketoconazole (NIZORAL) 2 % cream, Apply topically daily, Disp: 60 g, Rfl: 0    Multiple Vitamins-Minerals (MULTIVITAMIN WITH MINERALS) tablet, Take 1 tablet by mouth daily, Disp: , Rfl:     omega-3-acid ethyl esters (LOVAZA) 1 g capsule, Take 1 g by mouth 2 (two) times a day, Disp: , Rfl:     predniSONE 20 mg tablet, Take 1 tablet (20 mg total) by mouth daily, Disp: 5 tablet, Rfl: 0    traZODone (DESYREL) 100 mg tablet, Take 1.5 tablets (150 mg total) by mouth daily at bedtime, Disp: 135 tablet,  "Rfl: 3    Wegovy 2.4 MG/0.75ML, INJECT 0.75ML SUBCUTANEOUSLY ONCE WEEKLY, Disp: 3 mL, Rfl: 0    Allergies   Allergen Reactions    Other Itching and Rash     Dust, bugs        Social History   Past Surgical History:   Procedure Laterality Date    ABLATION COLPOCLESIS      TUBAL LIGATION       Family History   Problem Relation Age of Onset    No Known Problems Mother     Diabetes Father     Glaucoma Father     No Known Problems Sister     No Known Problems Daughter     Breast cancer Maternal Grandmother         unknown age    No Known Problems Maternal Grandfather     Breast cancer Paternal Grandmother         unknown age    No Known Problems Paternal Grandfather     No Known Problems Son     No Known Problems Sister     No Known Problems Paternal Aunt        Objective:  /74 (BP Location: Left arm, Patient Position: Sitting, Cuff Size: Large)   Pulse 74   Temp 97.8 °F (36.6 °C) (Temporal)   Ht 5' 10\" (1.778 m)   Wt 87.6 kg (193 lb 3.2 oz)   LMP  (LMP Unknown)   SpO2 99% Comment: room air  BMI 27.72 kg/m²        Physical Exam  Vitals reviewed.   Constitutional:       General: She is not in acute distress.     Appearance: She is not toxic-appearing.   HENT:      Head: Normocephalic and atraumatic.      Right Ear: Tympanic membrane, ear canal and external ear normal. There is no impacted cerumen.      Left Ear: Tympanic membrane, ear canal and external ear normal. There is no impacted cerumen.      Nose: Nose normal.      Mouth/Throat:      Mouth: Mucous membranes are moist.   Eyes:      General:         Right eye: No discharge.         Left eye: No discharge.      Extraocular Movements: Extraocular movements intact.      Conjunctiva/sclera: Conjunctivae normal.      Pupils: Pupils are equal, round, and reactive to light.   Cardiovascular:      Rate and Rhythm: Normal rate and regular rhythm.   Pulmonary:      Effort: Pulmonary effort is normal. No respiratory distress.      Breath sounds: Normal breath " sounds. No wheezing or rhonchi.   Musculoskeletal:      Cervical back: Normal range of motion.   Lymphadenopathy:      Cervical: No cervical adenopathy.   Skin:     Findings: Rash (R flank - erythematous macular rash - annular with small satelitte lesions around perimeter) present. No bruising or lesion.   Neurological:      General: No focal deficit present.      Mental Status: She is alert and oriented to person, place, and time.   Psychiatric:         Mood and Affect: Mood normal.         Behavior: Behavior normal.

## 2024-01-26 DIAGNOSIS — E66.3 OVERWEIGHT: ICD-10-CM

## 2024-01-26 DIAGNOSIS — I10 BENIGN ESSENTIAL HYPERTENSION: ICD-10-CM

## 2024-01-26 RX ORDER — SEMAGLUTIDE 2.4 MG/.75ML
2.4 INJECTION, SOLUTION SUBCUTANEOUS WEEKLY
Qty: 3 ML | Refills: 2 | Status: SHIPPED | OUTPATIENT
Start: 2024-01-26 | End: 2024-04-19

## 2024-01-26 NOTE — TELEPHONE ENCOUNTER
Patient called requesting a refill of her wegovy 2.4mg to go to [facility] in [place].

## 2024-01-30 ENCOUNTER — OFFICE VISIT (OUTPATIENT)
Dept: OBGYN CLINIC | Facility: HOSPITAL | Age: 64
End: 2024-01-30
Payer: COMMERCIAL

## 2024-01-30 VITALS
SYSTOLIC BLOOD PRESSURE: 145 MMHG | HEIGHT: 70 IN | DIASTOLIC BLOOD PRESSURE: 89 MMHG | HEART RATE: 74 BPM | BODY MASS INDEX: 27.83 KG/M2 | WEIGHT: 194.4 LBS

## 2024-01-30 DIAGNOSIS — M17.12 PRIMARY OSTEOARTHRITIS OF LEFT KNEE: Primary | ICD-10-CM

## 2024-01-30 PROCEDURE — 99213 OFFICE O/P EST LOW 20 MIN: CPT

## 2024-01-30 PROCEDURE — 20610 DRAIN/INJ JOINT/BURSA W/O US: CPT

## 2024-01-30 RX ORDER — BUPIVACAINE HYDROCHLORIDE 2.5 MG/ML
4 INJECTION, SOLUTION INFILTRATION; PERINEURAL
Status: COMPLETED | OUTPATIENT
Start: 2024-01-30 | End: 2024-01-30

## 2024-01-30 RX ORDER — METHYLPREDNISOLONE ACETATE 40 MG/ML
2 INJECTION, SUSPENSION INTRA-ARTICULAR; INTRALESIONAL; INTRAMUSCULAR; SOFT TISSUE
Status: COMPLETED | OUTPATIENT
Start: 2024-01-30 | End: 2024-01-30

## 2024-01-30 RX ADMIN — BUPIVACAINE HYDROCHLORIDE 4 ML: 2.5 INJECTION, SOLUTION INFILTRATION; PERINEURAL at 07:45

## 2024-01-30 RX ADMIN — METHYLPREDNISOLONE ACETATE 2 ML: 40 INJECTION, SUSPENSION INTRA-ARTICULAR; INTRALESIONAL; INTRAMUSCULAR; SOFT TISSUE at 07:45

## 2024-01-30 NOTE — PROGRESS NOTES
Assessment/Plan:   Diagnoses and all orders for this visit:    Primary osteoarthritis of left knee  -     Large joint arthrocentesis: L knee    Discussed with patient that today's physical exam is consistent with flareup of primary osteoarthritis of the left knee. Reviewed continue conservative management via activity modification, bracing, OTC NSAIDs/Tylenol, repeat CSI, and topical anti-inflammatories. Patient was offered, and accepted, Depo-Medrol and Marcaine injection(s) to the left knee for relief of pain and inflammation.  Patient tolerated treatment(s) well. She will be seen as needed for re-evaluation and consideration for repeat injections as necessary.  Patient expresses understanding and is in agreement with this treatment plan.    Subjective:   Patient ID: Ibis Schmid  1960     HPI  Patient is a 63 y.o. female who presents for follow-up evaluation of primary osteoarthritis of the left knee. She was last seen in regards to this issue on 6/26/2023, which time she received a CS injection. Patient states that she got significant relief following this injection, decreasing her overall pain to 0-1/10. He states that she began to experience a recurrence of her symptoms 1 week ago, for a total of almost 6 months of relief. On today's presentation she reports medial achy knee pain that is exacerbated with twisting motions and going up and down stairs. She denies any associated bruising, numbness, tingling, or feelings of instability. She does report occasional swelling.    The following portions of the patient's history were reviewed and updated as appropriate:  Past medical history, past surgical history, Family history, social history, current medications and allergies    History reviewed. No pertinent past medical history.    Past Surgical History:   Procedure Laterality Date   • ABLATION COLPOCLESIS     • TUBAL LIGATION         Family History   Problem Relation Age of Onset   • No Known Problems  Mother    • Diabetes Father    • Glaucoma Father    • No Known Problems Sister    • No Known Problems Daughter    • Breast cancer Maternal Grandmother         unknown age   • No Known Problems Maternal Grandfather    • Breast cancer Paternal Grandmother         unknown age   • No Known Problems Paternal Grandfather    • No Known Problems Son    • No Known Problems Sister    • No Known Problems Paternal Aunt        Social History     Socioeconomic History   • Marital status:      Spouse name: None   • Number of children: None   • Years of education: None   • Highest education level: None   Occupational History   • None   Tobacco Use   • Smoking status: Never   • Smokeless tobacco: Never   Vaping Use   • Vaping status: Never Used   Substance and Sexual Activity   • Alcohol use: Yes     Alcohol/week: 1.0 standard drink of alcohol     Types: 1 Cans of beer per week     Comment: social   • Drug use: Never   • Sexual activity: Yes   Other Topics Concern   • None   Social History Narrative   • None     Social Determinants of Health     Financial Resource Strain: Not on file   Food Insecurity: Not on file   Transportation Needs: Not on file   Physical Activity: Not on file   Stress: Not on file   Social Connections: Not on file   Intimate Partner Violence: Not on file   Housing Stability: Not on file         Current Outpatient Medications:   •  albuterol (ProAir HFA) 90 mcg/act inhaler, Inhale 2 puffs every 6 (six) hours as needed for wheezing, Disp: 18 g, Rfl: 0  •  buPROPion (WELLBUTRIN XL) 300 mg 24 hr tablet, Take 1 tablet (300 mg total) by mouth every morning, Disp: 90 tablet, Rfl: 1  •  CALCIUM PO, Take by mouth, Disp: , Rfl:   •  Cholecalciferol (VITAMIN D-3 PO), Take by mouth, Disp: , Rfl:   •  ketoconazole (NIZORAL) 2 % cream, Apply topically daily, Disp: 60 g, Rfl: 0  •  Multiple Vitamins-Minerals (MULTIVITAMIN WITH MINERALS) tablet, Take 1 tablet by mouth daily, Disp: , Rfl:   •  omega-3-acid ethyl esters  "(LOVAZA) 1 g capsule, Take 1 g by mouth 2 (two) times a day, Disp: , Rfl:   •  predniSONE 20 mg tablet, Take 1 tablet (20 mg total) by mouth daily, Disp: 5 tablet, Rfl: 0  •  Semaglutide-Weight Management (Wegovy) 2.4 MG/0.75ML, Inject 0.75 mL (2.4 mg total) under the skin once a week, Disp: 3 mL, Rfl: 2  •  traZODone (DESYREL) 100 mg tablet, Take 1.5 tablets (150 mg total) by mouth daily at bedtime, Disp: 135 tablet, Rfl: 3    Allergies   Allergen Reactions   • Other Itching and Rash     Dust, bugs        Review of Systems   Constitutional:  Negative for chills, fever and unexpected weight change.   HENT:  Negative for hearing loss, nosebleeds and sore throat.    Eyes:  Negative for pain, redness and visual disturbance.   Respiratory:  Negative for cough, shortness of breath and wheezing.    Cardiovascular:  Negative for chest pain, palpitations and leg swelling.   Gastrointestinal:  Negative for abdominal pain, nausea and vomiting.   Endocrine: Negative for polydipsia and polyuria.   Genitourinary:  Negative for dysuria and hematuria.   Musculoskeletal:         As noted in HPI   Skin:  Negative for rash and wound.   Neurological:  Negative for dizziness, numbness and headaches.   Psychiatric/Behavioral:  Negative for decreased concentration and suicidal ideas. The patient is not nervous/anxious.         Objective:  /89   Pulse 74   Ht 5' 10\" (1.778 m)   Wt 88.2 kg (194 lb 6.4 oz)   LMP  (LMP Unknown)   BMI 27.89 kg/m²     Ortho Exam  Left knee -   Patient ambulates with normal gait pattern  Uses no assistive device  Mild genu varus anatomical deformity  Skin is warm and dry to touch with no signs of erythema, ecchymosis, infection  Mild soft tissue swelling, no effusion noted  ROM 0-125  TTP over Cochran joint line, TTP over pes anserine bursa, nontender over lateral joint line, nontender over popliteal fossa  Flexor and extensor mechanisms intact  Knee is stable to varus and valgus stress  Negative " "Lachman's  Negative anterior Drawer, - Posterior Drawer  - medial Eulalia's, - lateral Eulalia's  Patella tracks centrally with mild palpable crepitus  Calf compartments are soft and supple  2+ TP and DP pulses with brisk capillary refill to the toes  Sural, saphenous, tibial, superficial and deep peroneal motor and sensory distributions intact  Sensation light touch intact distally    Physical Exam  Vitals and nursing note reviewed.   Constitutional:       General: She is not in acute distress.     Appearance: She is well-developed.   HENT:      Head: Normocephalic and atraumatic.   Eyes:      Conjunctiva/sclera: Conjunctivae normal.   Cardiovascular:      Rate and Rhythm: Normal rate.   Pulmonary:      Effort: Pulmonary effort is normal.   Musculoskeletal:      Cervical back: Neck supple.   Skin:     General: Skin is warm and dry.      Capillary Refill: Capillary refill takes less than 2 seconds.   Neurological:      Mental Status: She is alert and oriented to person, place, and time.   Psychiatric:         Mood and Affect: Mood normal.         Behavior: Behavior normal.          Diagnostic Test Review:  No new imaging reviewed this visit    Large joint arthrocentesis: L knee  Universal Protocol:  Consent: Verbal consent obtained.  Risks and benefits: risks, benefits and alternatives were discussed  Consent given by: patient  Time out: Immediately prior to procedure a \"time out\" was called to verify the correct patient, procedure, equipment, support staff and site/side marked as required.  Timeout called at: 1/30/2024 8:20 AM.  Patient understanding: patient states understanding of the procedure being performed  Site marked: the operative site was marked  Patient identity confirmed: verbally with patient  Supporting Documentation  Indications: pain and joint swelling   Procedure Details  Location: knee - L knee  Preparation: Patient was prepped and draped in the usual sterile fashion  Needle gauge: " 21G.  Ultrasound guidance: no  Approach: anterior  Medications administered: 4 mL bupivacaine 0.25 %; 2 mL methylPREDNISolone acetate 40 mg/mL    Patient tolerance: patient tolerated the procedure well with no immediate complications  Dressing:  Sterile dressing applied           Scribe Attestation    I,:  Tyrese Ortiz am acting as a scribe while in the presence of the attending physician.:       I,:  Pancho Sales MD personally performed the services described in this documentation    as scribed in my presence.:

## 2024-02-09 ENCOUNTER — CLINICAL SUPPORT (OUTPATIENT)
Dept: BARIATRICS | Facility: CLINIC | Age: 64
End: 2024-02-09

## 2024-02-09 VITALS
HEART RATE: 79 BPM | DIASTOLIC BLOOD PRESSURE: 79 MMHG | BODY MASS INDEX: 29.88 KG/M2 | RESPIRATION RATE: 16 BRPM | SYSTOLIC BLOOD PRESSURE: 110 MMHG | WEIGHT: 190.4 LBS | HEIGHT: 67 IN

## 2024-02-09 DIAGNOSIS — R63.5 ABNORMAL WEIGHT GAIN: Primary | ICD-10-CM

## 2024-02-09 PROCEDURE — RECHECK

## 2024-02-09 NOTE — PROGRESS NOTES
Patient last visit weight:  Patient current visit weight:    If you are taking phentermine or other oral weight loss medications, are you experiencing any of the following symptoms:  Headache:   Blurred Vision:   Chest Pain:   Palpitations:  Insomnia:   SPECIFY ORAL MEDICATION AND DOSAGE:     If you are taking an injectable medication,  are you experiencing any of the following symptoms:  Bloating:  No  Nausea: No  Vomiting:  No  Constipation:  No  Diarrhea: No  SPECIFY INJECTABLE MEDICATION AND CURRENT DOSAGE: Wegovy      Vitals:    Is BP less than 100/60? No  Is BP greater than 140/90? No  Is HR greater than 100? No  **If yes to any of the above, have patient relax and repeat in 5-10 minutes**    Repeat values:    Is BP less than 100/60?  Is BP greater than 140/90?  Is HR greater than 100?  **If values remain outside of ranges above, please consult provider for next steps**

## 2024-04-02 ENCOUNTER — OFFICE VISIT (OUTPATIENT)
Dept: OBGYN CLINIC | Facility: HOSPITAL | Age: 64
End: 2024-04-02
Payer: COMMERCIAL

## 2024-04-02 VITALS
BODY MASS INDEX: 29.82 KG/M2 | DIASTOLIC BLOOD PRESSURE: 87 MMHG | HEIGHT: 67 IN | HEART RATE: 82 BPM | SYSTOLIC BLOOD PRESSURE: 132 MMHG

## 2024-04-02 DIAGNOSIS — M17.12 PRIMARY OSTEOARTHRITIS OF LEFT KNEE: Primary | ICD-10-CM

## 2024-04-02 PROCEDURE — 20610 DRAIN/INJ JOINT/BURSA W/O US: CPT | Performed by: ORTHOPAEDIC SURGERY

## 2024-04-02 PROCEDURE — 99213 OFFICE O/P EST LOW 20 MIN: CPT | Performed by: ORTHOPAEDIC SURGERY

## 2024-04-02 RX ORDER — BUPIVACAINE HYDROCHLORIDE 2.5 MG/ML
4 INJECTION, SOLUTION INFILTRATION; PERINEURAL
Status: COMPLETED | OUTPATIENT
Start: 2024-04-02 | End: 2024-04-02

## 2024-04-02 RX ORDER — BETAMETHASONE SODIUM PHOSPHATE AND BETAMETHASONE ACETATE 3; 3 MG/ML; MG/ML
6 INJECTION, SUSPENSION INTRA-ARTICULAR; INTRALESIONAL; INTRAMUSCULAR; SOFT TISSUE
Status: COMPLETED | OUTPATIENT
Start: 2024-04-02 | End: 2024-04-02

## 2024-04-02 RX ADMIN — BUPIVACAINE HYDROCHLORIDE 4 ML: 2.5 INJECTION, SOLUTION INFILTRATION; PERINEURAL at 07:15

## 2024-04-02 RX ADMIN — BETAMETHASONE SODIUM PHOSPHATE AND BETAMETHASONE ACETATE 6 MG: 3; 3 INJECTION, SUSPENSION INTRA-ARTICULAR; INTRALESIONAL; INTRAMUSCULAR; SOFT TISSUE at 07:15

## 2024-04-02 NOTE — ASSESSMENT & PLAN NOTE
We discussed at length her options.  She wishes to proceed with viscosupplementation which I think is completely reasonable at this point.  I did give her another Celestone injection in her knee to try to bite her over.  We will get her authorized for the viscosupplementation series.  If that fails then I think her only option at that point is to consider total knee arthroplasty.  We will see her back after she gets her Visco supplementation authorized.

## 2024-04-02 NOTE — PROGRESS NOTES
"Assessment/Plan:    Primary osteoarthritis of left knee  We discussed at length her options.  She wishes to proceed with viscosupplementation which I think is completely reasonable at this point.  I did give her another Celestone injection in her knee to try to bite her over.  We will get her authorized for the viscosupplementation series.  If that fails then I think her only option at that point is to consider total knee arthroplasty.  We will see her back after she gets her Visco supplementation authorized.       Diagnoses and all orders for this visit:    Primary osteoarthritis of left knee  -     Large joint arthrocentesis: L knee          Subjective:      Patient ID: Ibis Schmid is a 63 y.o. female.     This is a 63-year-old woman who works at [employer] and now returns.  She states that the knee injection from January did not really help at all.  She is having a lot of pain with her knee and its only increasing in intensity.  It really bothers her throughout the entire day.  She is trying to be as active as she can.  She describes the pain as being throughout the actual articular surfaces of her knee and then radiating up anterior laterally.        The following portions of the patient's history were reviewed and updated as appropriate: allergies, current medications, past family history, past medical history, past social history, past surgical history, and problem list.    Review of Systems      Objective:    On physical examination she does have some crepitation in with motion.  She has minimal tenderness.  Her alignment looks good.  She has no effusion.  Skin is intact compartments are soft.  She has full range of motion.  No deformity.      /87   Pulse 82   Ht 5' 7\" (1.702 m)   LMP  (LMP Unknown)   BMI 29.82 kg/m²          Physical Exam      Large joint arthrocentesis: L knee  Universal Protocol:  Consent given by: patient  Timeout called at: 4/2/2024 7:33 AM.  Patient identity confirmed: " verbally with patient  Supporting Documentation  Indications: pain   Procedure Details  Location: knee - L knee  Needle size: 22 G  Approach: anterolateral  Medications administered: 4 mL bupivacaine 0.25 %; 6 mg betamethasone acetate-betamethasone sodium phosphate 6 (3-3) mg/mL

## 2024-04-30 ENCOUNTER — TELEPHONE (OUTPATIENT)
Age: 64
End: 2024-04-30

## 2024-04-30 DIAGNOSIS — M25.562 CHRONIC PAIN OF LEFT KNEE: ICD-10-CM

## 2024-04-30 DIAGNOSIS — G89.29 CHRONIC PAIN OF LEFT KNEE: ICD-10-CM

## 2024-04-30 DIAGNOSIS — M17.12 PRIMARY OSTEOARTHRITIS OF LEFT KNEE: Primary | ICD-10-CM

## 2024-04-30 NOTE — TELEPHONE ENCOUNTER
Caller: Ibis    Doctor: Henrry     Reason for call: Patient was in on 4/2 and was supposed to have Visco injections ordered, I did not see anything in patient's chart, can we order those injections for her?    Call back#: [phone]

## 2024-05-01 DIAGNOSIS — E66.3 OVERWEIGHT: Primary | ICD-10-CM

## 2024-05-01 DIAGNOSIS — I10 BENIGN ESSENTIAL HYPERTENSION: ICD-10-CM

## 2024-05-02 RX ORDER — SEMAGLUTIDE 2.4 MG/.75ML
INJECTION, SOLUTION SUBCUTANEOUS
Qty: 3 ML | Refills: 0 | Status: SHIPPED | OUTPATIENT
Start: 2024-05-02 | End: 2024-05-06

## 2024-05-03 DIAGNOSIS — I10 BENIGN ESSENTIAL HYPERTENSION: ICD-10-CM

## 2024-05-03 DIAGNOSIS — E66.3 OVERWEIGHT: ICD-10-CM

## 2024-05-06 RX ORDER — SEMAGLUTIDE 2.4 MG/.75ML
INJECTION, SOLUTION SUBCUTANEOUS
Qty: 3 ML | Refills: 0 | Status: SHIPPED | OUTPATIENT
Start: 2024-05-06

## 2024-05-07 ENCOUNTER — TELEPHONE (OUTPATIENT)
Age: 64
End: 2024-05-07

## 2024-05-07 NOTE — TELEPHONE ENCOUNTER
Caller: Ibis    Doctor: Sindy    Reason for call: Wegovy Rx was sent to pharmacy, but the pharmacy is still waiting for the prior auth. Patient is wondering what is going on with PA for medication     Call back#: [phone]

## 2024-05-08 NOTE — TELEPHONE ENCOUNTER
PA for Wegovy    Submitted via    []CMM-KEY   [x][employer]iel[employer]wayFind-Case ID # [id number]   []Faxed to plan   []Other website   []Phone call Case ID #     Office notes sent, clinical questions answered. Awaiting determination    Turnaround time for your insurance to make a decision on your Prior Authorization can take 7-21 business days.

## 2024-05-09 ENCOUNTER — TELEPHONE (OUTPATIENT)
Age: 64
End: 2024-05-09

## 2024-05-09 ENCOUNTER — TELEPHONE (OUTPATIENT)
Dept: OBGYN CLINIC | Facility: HOSPITAL | Age: 64
End: 2024-05-09

## 2024-05-09 NOTE — TELEPHONE ENCOUNTER
I called the pt and informed her that Adriana (Dr. Sales's PA) does all of the visco injections and her appointments will have to be moved to Thursday's instead which is when Adriana has a schedule. I asked her to give us a call back so we can reschedule them.

## 2024-05-09 NOTE — TELEPHONE ENCOUNTER
Caller: Ibis    Doctor: Henrry     Reason for call: Received the message that she has to r/s her injections, and is extremely upset, she had to change her work schedule and take off for these appointment's and this is the second time we had to r/s on her. She is asking f there is anyway she can keep these appointment's?     Call back#: [phone]

## 2024-05-10 ENCOUNTER — OFFICE VISIT (OUTPATIENT)
Dept: BARIATRICS | Facility: CLINIC | Age: 64
End: 2024-05-10
Payer: COMMERCIAL

## 2024-05-10 VITALS
DIASTOLIC BLOOD PRESSURE: 80 MMHG | WEIGHT: 195 LBS | BODY MASS INDEX: 30.61 KG/M2 | TEMPERATURE: 97.6 F | HEIGHT: 67 IN | SYSTOLIC BLOOD PRESSURE: 128 MMHG | HEART RATE: 64 BPM

## 2024-05-10 DIAGNOSIS — E66.09 CLASS 1 OBESITY DUE TO EXCESS CALORIES WITHOUT SERIOUS COMORBIDITY WITH BODY MASS INDEX (BMI) OF 30.0 TO 30.9 IN ADULT: Primary | ICD-10-CM

## 2024-05-10 DIAGNOSIS — I10 BENIGN ESSENTIAL HYPERTENSION: ICD-10-CM

## 2024-05-10 DIAGNOSIS — F41.1 ANXIETY STATE: ICD-10-CM

## 2024-05-10 PROCEDURE — 99214 OFFICE O/P EST MOD 30 MIN: CPT | Performed by: PHYSICIAN ASSISTANT

## 2024-05-10 NOTE — PROGRESS NOTES
Assessment/Plan:    Class 1 obesity due to excess calories without serious comorbidity with body mass index (BMI) of 30.0 to 30.9 in adult  -Patient is pursuing Conservative Program  -Initial weight loss goal of 5-10% weight loss for improved health  -Screening labs: up to date 4/26/23    -Dietary recall reviewed. Discussed making sure to get a serving of protein in the AM and she will continue orgain shakes  -recommend continuing to walk for exercise and encouraged to start using peloton again.   -continue with increased water intake    Initial: 217.2 lbs  Last visit: 202  Current: 195  Change: -22.2 lbs  Goal: 160 lbs    Currently on wegovy 2.4mg  Has lost 11% body weight. Denies hx Pancreatitis or family hx MEN2 or MTC. SE profile reviewed. medication agreement signed  On wellbutrin from PCP also  Prior medications:-Started on Saxenda 1/9/2023 but did not meet requirements to continue and did not feel much effect from the medication. Also on phentermine prior    Anxiety state  No longer taking wellbutrin.  Was not helping with anxiety or with weight loss    Benign essential hypertension  Blood pressure controlled. Not on medication        Follow up in approximately  5 months  with Non-Surgical Physician/Advanced Practitioner.     Diagnoses and all orders for this visit:    Class 1 obesity due to excess calories without serious comorbidity with body mass index (BMI) of 30.0 to 30.9 in adult  -     CBC and differential; Future  -     Comprehensive metabolic panel; Future    Anxiety state    Benign essential hypertension          Subjective:   Chief Complaint   Patient presents with    Follow-up     MWM  4 month F/U  waist  36 in.        Patient ID: Ibis Schmid  is a 63 y.o. female with excess weight/obesity here to pursue weight managment.  Patient is pursuing Conservative Program.     HPI  She is on wegovy 2.4mg but last injection was about 1.5 weeks ago. She does feel she gained a little weight related to  beer intake last week.  Typically not having alcohol. She has been having knee problems and did have cortisone injection about 3 weeks ago also. She is going to be going for syncvisc   Wt Readings from Last 10 Encounters:   05/10/24 88.5 kg (195 lb)   02/09/24 86.4 kg (190 lb 6.4 oz)   01/30/24 88.2 kg (194 lb 6.4 oz)   01/25/24 87.6 kg (193 lb 3.2 oz)   08/11/23 91.6 kg (202 lb)   06/26/23 95.9 kg (211 lb 6.4 oz)   06/09/23 96.5 kg (212 lb 12.8 oz)   05/03/23 98 kg (216 lb)   05/01/23 97.1 kg (214 lb)   04/18/23 97.8 kg (215 lb 9.6 oz)       Food logging:not currently  Increased appetite/cravings:controlled  Exercise:walking regularly, has peloton but not using currently  Hydration:at least 64 oz water, has cut back on alcohol intake     Colonoscopy-ordered prior     Diet Recall:  B: ham, cheese , eggs  S:protein shake just started this week .   D:varies. Sometimes not having a meal after more than 12 hour work day      The following portions of the patient's history were reviewed and updated as appropriate: She  has no past medical history on file.  She   Patient Active Problem List    Diagnosis Date Noted    Primary osteoarthritis of left knee 04/02/2024    Class 1 obesity due to excess calories without serious comorbidity with body mass index (BMI) of 30.0 to 30.9 in adult 12/30/2022    Prediabetes 12/30/2022    Bursitis of left knee 07/23/2021    Acute pain of left knee 07/23/2021    COVID-19 12/01/2020    Benign essential hypertension 12/04/2014    Anxiety state 02/20/2014    Leiomyoma of uterus 11/21/2013     She  has a past surgical history that includes Tubal ligation and Ablation colpoclesis.  Her family history includes Breast cancer in her maternal grandmother and paternal grandmother; Diabetes in her father; Glaucoma in her father; No Known Problems in her daughter, maternal grandfather, mother, paternal aunt, paternal grandfather, sister, sister, and son.  She  reports that she has never smoked. She  has never used smokeless tobacco. She reports current alcohol use of about 1.0 standard drink of alcohol per week. She reports that she does not use drugs.  Current Outpatient Medications   Medication Sig Dispense Refill    albuterol (ProAir HFA) 90 mcg/act inhaler Inhale 2 puffs every 6 (six) hours as needed for wheezing 18 g 0    CALCIUM PO Take by mouth      Cholecalciferol (VITAMIN D-3 PO) Take by mouth      Multiple Vitamins-Minerals (MULTIVITAMIN WITH MINERALS) tablet Take 1 tablet by mouth daily      omega-3-acid ethyl esters (LOVAZA) 1 g capsule Take 1 g by mouth 2 (two) times a day      traZODone (DESYREL) 100 mg tablet Take 1.5 tablets (150 mg total) by mouth daily at bedtime 135 tablet 3    Wegovy 2.4 MG/0.75ML INJECT 0.75ML SUBCUTANEOUSLY ONCE WEEKLY 3 mL 0    ketoconazole (NIZORAL) 2 % cream Apply topically daily (Patient not taking: Reported on 5/10/2024) 60 g 0     No current facility-administered medications for this visit.     Current Outpatient Medications on File Prior to Visit   Medication Sig    albuterol (ProAir HFA) 90 mcg/act inhaler Inhale 2 puffs every 6 (six) hours as needed for wheezing    CALCIUM PO Take by mouth    Cholecalciferol (VITAMIN D-3 PO) Take by mouth    Multiple Vitamins-Minerals (MULTIVITAMIN WITH MINERALS) tablet Take 1 tablet by mouth daily    omega-3-acid ethyl esters (LOVAZA) 1 g capsule Take 1 g by mouth 2 (two) times a day    traZODone (DESYREL) 100 mg tablet Take 1.5 tablets (150 mg total) by mouth daily at bedtime    Wegovy 2.4 MG/0.75ML INJECT 0.75ML SUBCUTANEOUSLY ONCE WEEKLY    ketoconazole (NIZORAL) 2 % cream Apply topically daily (Patient not taking: Reported on 5/10/2024)    [DISCONTINUED] buPROPion (WELLBUTRIN XL) 300 mg 24 hr tablet Take 1 tablet (300 mg total) by mouth every morning (Patient not taking: Reported on 5/10/2024)    [DISCONTINUED] predniSONE 20 mg tablet Take 1 tablet (20 mg total) by mouth daily (Patient not taking: Reported on 5/10/2024)  "    No current facility-administered medications on file prior to visit.     She is allergic to other..    Review of Systems   Constitutional:  Negative for fever.   Respiratory:  Negative for shortness of breath.    Cardiovascular:  Negative for chest pain and palpitations.   Gastrointestinal:  Negative for abdominal pain, constipation, diarrhea and vomiting.   Genitourinary:  Negative for difficulty urinating.   Musculoskeletal:  Positive for arthralgias.   Skin:  Negative for rash.   Neurological:  Negative for headaches.   Psychiatric/Behavioral:  Negative for dysphoric mood. The patient is not nervous/anxious.        Objective:    /80   Pulse 64   Temp 97.6 °F (36.4 °C) (Tympanic)   Ht 5' 7\" (1.702 m)   Wt 88.5 kg (195 lb)   LMP  (LMP Unknown)   BMI 30.54 kg/m²      Physical Exam  Vitals and nursing note reviewed.   Constitutional:       General: She is not in acute distress.     Appearance: She is well-developed. She is obese.   HENT:      Head: Normocephalic and atraumatic.   Eyes:      Conjunctiva/sclera: Conjunctivae normal.   Neck:      Thyroid: No thyromegaly.   Pulmonary:      Effort: Pulmonary effort is normal. No respiratory distress.   Skin:     Findings: No rash (visible).   Neurological:      Mental Status: She is alert and oriented to person, place, and time.   Psychiatric:         Mood and Affect: Mood normal.         Behavior: Behavior normal.     ,m  "

## 2024-05-10 NOTE — ASSESSMENT & PLAN NOTE
-Patient is pursuing Conservative Program  -Initial weight loss goal of 5-10% weight loss for improved health  -Screening labs: up to date 4/26/23    -Dietary recall reviewed. Discussed making sure to get a serving of protein in the AM and she will continue orgain shakes  -recommend continuing to walk for exercise and encouraged to start using peloton again.   -continue with increased water intake    Initial: 217.2 lbs  Last visit: 202  Current: 195  Change: -22.2 lbs  Goal: 160 lbs    Currently on wegovy 2.4mg  Has lost 11% body weight. Denies hx Pancreatitis or family hx MEN2 or MTC. SE profile reviewed. medication agreement signed  On wellbutrin from PCP also  Prior medications:-Started on Saxenda 1/9/2023 but did not meet requirements to continue and did not feel much effect from the medication. Also on phentermine prior

## 2024-05-14 ENCOUNTER — APPOINTMENT (OUTPATIENT)
Dept: LAB | Facility: CLINIC | Age: 64
End: 2024-05-14
Payer: COMMERCIAL

## 2024-05-14 ENCOUNTER — PROCEDURE VISIT (OUTPATIENT)
Dept: OBGYN CLINIC | Facility: HOSPITAL | Age: 64
End: 2024-05-14
Payer: COMMERCIAL

## 2024-05-14 VITALS
HEART RATE: 71 BPM | BODY MASS INDEX: 30.61 KG/M2 | WEIGHT: 195 LBS | SYSTOLIC BLOOD PRESSURE: 133 MMHG | HEIGHT: 67 IN | DIASTOLIC BLOOD PRESSURE: 84 MMHG

## 2024-05-14 DIAGNOSIS — E55.9 VITAMIN D DEFICIENCY: ICD-10-CM

## 2024-05-14 DIAGNOSIS — E66.09 CLASS 1 OBESITY DUE TO EXCESS CALORIES WITHOUT SERIOUS COMORBIDITY WITH BODY MASS INDEX (BMI) OF 30.0 TO 30.9 IN ADULT: ICD-10-CM

## 2024-05-14 DIAGNOSIS — R73.03 PRE-DIABETES: ICD-10-CM

## 2024-05-14 DIAGNOSIS — E66.3 OVERWEIGHT: ICD-10-CM

## 2024-05-14 DIAGNOSIS — Z00.8 ENCOUNTER FOR OTHER GENERAL EXAMINATION: ICD-10-CM

## 2024-05-14 DIAGNOSIS — M25.562 CHRONIC PAIN OF LEFT KNEE: ICD-10-CM

## 2024-05-14 DIAGNOSIS — G89.29 CHRONIC PAIN OF LEFT KNEE: ICD-10-CM

## 2024-05-14 DIAGNOSIS — M17.12 PRIMARY OSTEOARTHRITIS OF LEFT KNEE: Primary | ICD-10-CM

## 2024-05-14 LAB
25(OH)D3 SERPL-MCNC: 47.6 NG/ML (ref 30–100)
ALBUMIN SERPL BCP-MCNC: 4.2 G/DL (ref 3.5–5)
ALP SERPL-CCNC: 54 U/L (ref 34–104)
ALT SERPL W P-5'-P-CCNC: 31 U/L (ref 7–52)
ANION GAP SERPL CALCULATED.3IONS-SCNC: 10 MMOL/L (ref 4–13)
AST SERPL W P-5'-P-CCNC: 28 U/L (ref 13–39)
BASOPHILS # BLD AUTO: 0.06 THOUSANDS/ÂΜL (ref 0–0.1)
BASOPHILS NFR BLD AUTO: 1 % (ref 0–1)
BILIRUB SERPL-MCNC: 0.37 MG/DL (ref 0.2–1)
BUN SERPL-MCNC: 19 MG/DL (ref 5–25)
CALCIUM SERPL-MCNC: 9.4 MG/DL (ref 8.4–10.2)
CHLORIDE SERPL-SCNC: 107 MMOL/L (ref 96–108)
CHOLEST SERPL-MCNC: 195 MG/DL
CO2 SERPL-SCNC: 23 MMOL/L (ref 21–32)
CREAT SERPL-MCNC: 1.02 MG/DL (ref 0.6–1.3)
EOSINOPHIL # BLD AUTO: 0.11 THOUSAND/ÂΜL (ref 0–0.61)
EOSINOPHIL NFR BLD AUTO: 2 % (ref 0–6)
ERYTHROCYTE [DISTWIDTH] IN BLOOD BY AUTOMATED COUNT: 14.1 % (ref 11.6–15.1)
EST. AVERAGE GLUCOSE BLD GHB EST-MCNC: 114 MG/DL
GFR SERPL CREATININE-BSD FRML MDRD: 59 ML/MIN/1.73SQ M
GLUCOSE P FAST SERPL-MCNC: 90 MG/DL (ref 65–99)
HBA1C MFR BLD: 5.6 %
HCT VFR BLD AUTO: 40.2 % (ref 34.8–46.1)
HDLC SERPL-MCNC: 82 MG/DL
HGB BLD-MCNC: 12.7 G/DL (ref 11.5–15.4)
IMM GRANULOCYTES # BLD AUTO: 0.01 THOUSAND/UL (ref 0–0.2)
IMM GRANULOCYTES NFR BLD AUTO: 0 % (ref 0–2)
LDLC SERPL CALC-MCNC: 92 MG/DL (ref 0–100)
LYMPHOCYTES # BLD AUTO: 1.86 THOUSANDS/ÂΜL (ref 0.6–4.47)
LYMPHOCYTES NFR BLD AUTO: 37 % (ref 14–44)
MCH RBC QN AUTO: 28.2 PG (ref 26.8–34.3)
MCHC RBC AUTO-ENTMCNC: 31.6 G/DL (ref 31.4–37.4)
MCV RBC AUTO: 89 FL (ref 82–98)
MONOCYTES # BLD AUTO: 0.4 THOUSAND/ÂΜL (ref 0.17–1.22)
MONOCYTES NFR BLD AUTO: 8 % (ref 4–12)
NEUTROPHILS # BLD AUTO: 2.59 THOUSANDS/ÂΜL (ref 1.85–7.62)
NEUTS SEG NFR BLD AUTO: 52 % (ref 43–75)
NONHDLC SERPL-MCNC: 113 MG/DL
NRBC BLD AUTO-RTO: 0 /100 WBCS
PLATELET # BLD AUTO: 299 THOUSANDS/UL (ref 149–390)
PMV BLD AUTO: 9 FL (ref 8.9–12.7)
POTASSIUM SERPL-SCNC: 4 MMOL/L (ref 3.5–5.3)
PROT SERPL-MCNC: 7.2 G/DL (ref 6.4–8.4)
RBC # BLD AUTO: 4.5 MILLION/UL (ref 3.81–5.12)
SODIUM SERPL-SCNC: 140 MMOL/L (ref 135–147)
TRIGL SERPL-MCNC: 105 MG/DL
VIT B12 SERPL-MCNC: 1052 PG/ML (ref 180–914)
WBC # BLD AUTO: 5.03 THOUSAND/UL (ref 4.31–10.16)

## 2024-05-14 PROCEDURE — 83036 HEMOGLOBIN GLYCOSYLATED A1C: CPT

## 2024-05-14 PROCEDURE — 80061 LIPID PANEL: CPT

## 2024-05-14 PROCEDURE — 36415 COLL VENOUS BLD VENIPUNCTURE: CPT

## 2024-05-14 PROCEDURE — 82607 VITAMIN B-12: CPT

## 2024-05-14 PROCEDURE — 80053 COMPREHEN METABOLIC PANEL: CPT

## 2024-05-14 PROCEDURE — 20610 DRAIN/INJ JOINT/BURSA W/O US: CPT

## 2024-05-14 PROCEDURE — 82306 VITAMIN D 25 HYDROXY: CPT

## 2024-05-14 PROCEDURE — 85025 COMPLETE CBC W/AUTO DIFF WBC: CPT

## 2024-05-14 NOTE — TELEPHONE ENCOUNTER
PA for Wegovy Approved     Date(s) approved May 8, 2024 to May 8, 2025     Case # [id number]      Patient advised by          [] [employer] Message  [x] Phone call   [x]LMOM  []L/M to call office as no active Communication consent on file  []Unable to leave detailed message as VM not approved on Communication consent       Pharmacy advised by    [x]Fax  []Phone call    Approval letter scanned into Media Yes

## 2024-05-14 NOTE — PROGRESS NOTES
Assessment:   Diagnosis ICD-10-CM Associated Orders   1. Primary osteoarthritis of left knee  M17.12 Large joint arthrocentesis: L knee      2. Chronic pain of left knee  M25.562 Large joint arthrocentesis: L knee    G89.29           Plan:  The patient underwent her first in a 3-shot series of left knee Orthovisc injections at today's visit.  The procedure was tolerated well without any immediate complications.  I advised that rest, ice, compression, and elevation may be helpful to control knee pain and swelling for the first 24 hours after this injection.  We will see her back next week for repeat injection.    To do next visit:  Follow-up in 1 week for second left knee Orthovisc injection.    The above stated was discussed in layman's terms and the patient expressed understanding.  All questions were answered to the patient's satisfaction.         Subjective:   Ibis Schmid is a 63 y.o. female who presents to the office today for her first in a 3-shot series of left knee Orthovisc injections.  She notes 6/10 left knee pain today.  She is ready to proceed with injection.      Review of systems negative unless otherwise specified in HPI    History reviewed. No pertinent past medical history.    Past Surgical History:   Procedure Laterality Date    ABLATION COLPOCLESIS      TUBAL LIGATION         Family History   Problem Relation Age of Onset    No Known Problems Mother     Diabetes Father     Glaucoma Father     No Known Problems Sister     No Known Problems Daughter     Breast cancer Maternal Grandmother         unknown age    No Known Problems Maternal Grandfather     Breast cancer Paternal Grandmother         unknown age    No Known Problems Paternal Grandfather     No Known Problems Son     No Known Problems Sister     No Known Problems Paternal Aunt        Social History     Occupational History    Not on file   Tobacco Use    Smoking status: Never    Smokeless tobacco: Never   Vaping Use    Vaping status:  "Never Used   Substance and Sexual Activity    Alcohol use: Yes     Alcohol/week: 1.0 standard drink of alcohol     Types: 1 Cans of beer per week     Comment: social    Drug use: Never    Sexual activity: Yes         Current Outpatient Medications:     albuterol (ProAir HFA) 90 mcg/act inhaler, Inhale 2 puffs every 6 (six) hours as needed for wheezing, Disp: 18 g, Rfl: 0    CALCIUM PO, Take by mouth, Disp: , Rfl:     Cholecalciferol (VITAMIN D-3 PO), Take by mouth, Disp: , Rfl:     Multiple Vitamins-Minerals (MULTIVITAMIN WITH MINERALS) tablet, Take 1 tablet by mouth daily, Disp: , Rfl:     omega-3-acid ethyl esters (LOVAZA) 1 g capsule, Take 1 g by mouth 2 (two) times a day, Disp: , Rfl:     traZODone (DESYREL) 100 mg tablet, Take 1.5 tablets (150 mg total) by mouth daily at bedtime, Disp: 135 tablet, Rfl: 3    Wegovy 2.4 MG/0.75ML, INJECT 0.75ML SUBCUTANEOUSLY ONCE WEEKLY, Disp: 3 mL, Rfl: 0    ketoconazole (NIZORAL) 2 % cream, Apply topically daily (Patient not taking: Reported on 5/10/2024), Disp: 60 g, Rfl: 0    Allergies   Allergen Reactions    Other Itching and Rash     Dust, bugs             Vitals:    05/14/24 0728   BP: 133/84   Pulse: 71       Objective:    General:  Patient is WDWN, alert and oriented, appears stated age, and is in no acute distress.    Musculoskeletal:    Left Knee:    Inspection:  The knee is without erythema or purulence indicative of infection which would preclude the patient from receiving a joint injection at today's visit.         Diagnostics, reviewed and taken today if performed as documented:    None performed        Procedures, if performed today:    Large joint arthrocentesis: L knee  Universal Protocol:  Consent: Verbal consent obtained.  Consent given by: patient  Time out: Immediately prior to procedure a \"time out\" was called to verify the correct patient, procedure, equipment, support staff and site/side marked as required.  Patient understanding: patient states " "understanding of the procedure being performed  Patient consent: the patient's understanding of the procedure matches consent given  Procedure consent: procedure consent matches procedure scheduled  Site marked: the operative site was marked  Patient identity confirmed: verbally with patient  Supporting Documentation  Indications: pain   Procedure Details  Location: knee - L knee  Preparation: Patient was prepped and draped in the usual sterile fashion  Needle size: 22 G  Ultrasound guidance: no  Approach: anterolateral  Medications administered: 30 mg sodium hyaluronate 30 mg/2 mL    Patient tolerance: patient tolerated the procedure well with no immediate complications  Dressing:  Sterile dressing applied        Portions of the record may have been created with voice recognition software.  Occasional wrong word or \"sound a like\" substitutions may have occurred due to the inherent limitations of voice recognition software.  Read the chart carefully and recognize, using context, where substitutions have occurred.  "

## 2024-05-21 ENCOUNTER — PROCEDURE VISIT (OUTPATIENT)
Dept: OBGYN CLINIC | Facility: HOSPITAL | Age: 64
End: 2024-05-21
Payer: COMMERCIAL

## 2024-05-21 VITALS
SYSTOLIC BLOOD PRESSURE: 124 MMHG | HEART RATE: 76 BPM | HEIGHT: 67 IN | DIASTOLIC BLOOD PRESSURE: 86 MMHG | BODY MASS INDEX: 30.61 KG/M2 | WEIGHT: 195 LBS

## 2024-05-21 DIAGNOSIS — G89.29 CHRONIC PAIN OF LEFT KNEE: ICD-10-CM

## 2024-05-21 DIAGNOSIS — M17.12 PRIMARY OSTEOARTHRITIS OF LEFT KNEE: Primary | ICD-10-CM

## 2024-05-21 DIAGNOSIS — M25.562 CHRONIC PAIN OF LEFT KNEE: ICD-10-CM

## 2024-05-21 PROCEDURE — 20610 DRAIN/INJ JOINT/BURSA W/O US: CPT

## 2024-05-21 NOTE — PROGRESS NOTES
Assessment:   Diagnosis ICD-10-CM Associated Orders   1. Primary osteoarthritis of left knee  M17.12 Large joint arthrocentesis: L knee      2. Chronic pain of left knee  M25.562 Large joint arthrocentesis: L knee    G89.29           Plan:  The patient underwent her second in a 3-shot series of left knee Orthovisc injections at today's visit.  The procedure was tolerated well without any immediate complications.  I advised that rest, ice, compression, and elevation may be helpful to control knee pain and swelling for the first 24 hours after this injection.  We will see her back next week for repeat injection.    To do next visit:  Follow-up in 1 week for third and final left knee Orthovisc injection.    The above stated was discussed in layman's terms and the patient expressed understanding.  All questions were answered to the patient's satisfaction.         Subjective:   Ibis Schmid is a 63 y.o. female who presents to the office today for her second in a 3-shot series of left knee Orthovisc injections.  She notes 5/10 left knee pain today.  She is ready to proceed with injection.      Review of systems negative unless otherwise specified in HPI    History reviewed. No pertinent past medical history.    Past Surgical History:   Procedure Laterality Date    ABLATION COLPOCLESIS      TUBAL LIGATION         Family History   Problem Relation Age of Onset    No Known Problems Mother     Diabetes Father     Glaucoma Father     No Known Problems Sister     No Known Problems Daughter     Breast cancer Maternal Grandmother         unknown age    No Known Problems Maternal Grandfather     Breast cancer Paternal Grandmother         unknown age    No Known Problems Paternal Grandfather     No Known Problems Son     No Known Problems Sister     No Known Problems Paternal Aunt        Social History     Occupational History    Not on file   Tobacco Use    Smoking status: Never    Smokeless tobacco: Never   Vaping Use     "Vaping status: Never Used   Substance and Sexual Activity    Alcohol use: Yes     Alcohol/week: 1.0 standard drink of alcohol     Types: 1 Cans of beer per week     Comment: social    Drug use: Never    Sexual activity: Yes         Current Outpatient Medications:     albuterol (ProAir HFA) 90 mcg/act inhaler, Inhale 2 puffs every 6 (six) hours as needed for wheezing, Disp: 18 g, Rfl: 0    CALCIUM PO, Take by mouth, Disp: , Rfl:     Cholecalciferol (VITAMIN D-3 PO), Take by mouth, Disp: , Rfl:     Multiple Vitamins-Minerals (MULTIVITAMIN WITH MINERALS) tablet, Take 1 tablet by mouth daily, Disp: , Rfl:     omega-3-acid ethyl esters (LOVAZA) 1 g capsule, Take 1 g by mouth 2 (two) times a day, Disp: , Rfl:     traZODone (DESYREL) 100 mg tablet, Take 1.5 tablets (150 mg total) by mouth daily at bedtime, Disp: 135 tablet, Rfl: 3    Wegovy 2.4 MG/0.75ML, INJECT 0.75ML SUBCUTANEOUSLY ONCE WEEKLY, Disp: 3 mL, Rfl: 0    ketoconazole (NIZORAL) 2 % cream, Apply topically daily (Patient not taking: Reported on 5/10/2024), Disp: 60 g, Rfl: 0    Allergies   Allergen Reactions    Other Itching and Rash     Dust, bugs             Vitals:    05/21/24 0731   BP: 124/86   Pulse: 76       Objective:    General:  Patient is WDWN, alert and oriented, appears stated age, and is in no acute distress.    Musculoskeletal:    Left Knee:    Inspection:  The knee is without erythema or purulence indicative of infection which would preclude the patient from receiving a joint injection at today's visit.         Diagnostics, reviewed and taken today if performed as documented:    None performed        Procedures, if performed today:    Large joint arthrocentesis: L knee  Universal Protocol:  Consent: Verbal consent obtained.  Consent given by: patient  Time out: Immediately prior to procedure a \"time out\" was called to verify the correct patient, procedure, equipment, support staff and site/side marked as required.  Patient understanding: patient " "states understanding of the procedure being performed  Patient consent: the patient's understanding of the procedure matches consent given  Procedure consent: procedure consent matches procedure scheduled  Site marked: the operative site was marked  Patient identity confirmed: verbally with patient  Supporting Documentation  Indications: pain   Procedure Details  Location: knee - L knee  Preparation: Patient was prepped and draped in the usual sterile fashion  Needle size: 22 G  Ultrasound guidance: no  Approach: anterolateral  Medications administered: 30 mg sodium hyaluronate 30 mg/2 mL    Patient tolerance: patient tolerated the procedure well with no immediate complications  Dressing:  Sterile dressing applied        Portions of the record may have been created with voice recognition software.  Occasional wrong word or \"sound a like\" substitutions may have occurred due to the inherent limitations of voice recognition software.  Read the chart carefully and recognize, using context, where substitutions have occurred.  "

## 2024-05-28 ENCOUNTER — PROCEDURE VISIT (OUTPATIENT)
Dept: OBGYN CLINIC | Facility: HOSPITAL | Age: 64
End: 2024-05-28
Payer: COMMERCIAL

## 2024-05-28 VITALS
DIASTOLIC BLOOD PRESSURE: 88 MMHG | SYSTOLIC BLOOD PRESSURE: 137 MMHG | BODY MASS INDEX: 30.61 KG/M2 | HEART RATE: 69 BPM | HEIGHT: 67 IN | WEIGHT: 195 LBS

## 2024-05-28 DIAGNOSIS — G89.29 CHRONIC PAIN OF LEFT KNEE: ICD-10-CM

## 2024-05-28 DIAGNOSIS — M25.562 CHRONIC PAIN OF LEFT KNEE: ICD-10-CM

## 2024-05-28 DIAGNOSIS — M17.12 PRIMARY OSTEOARTHRITIS OF LEFT KNEE: Primary | ICD-10-CM

## 2024-05-28 PROCEDURE — 20610 DRAIN/INJ JOINT/BURSA W/O US: CPT

## 2024-05-28 NOTE — PROGRESS NOTES
Assessment:   Diagnosis ICD-10-CM Associated Orders   1. Primary osteoarthritis of left knee  M17.12 Large joint arthrocentesis: L knee      2. Chronic pain of left knee  M25.562 Large joint arthrocentesis: L knee    G89.29           Plan:  The patient underwent her third and final in a 3-shot series of left knee Orthovisc injections at today's visit.  The procedure was tolerated well without any immediate complications.  I advised that rest, ice, compression, and elevation may be helpful to control knee pain and swelling for the first 24 hours after this injection.  We will see her back next week for repeat injection.    To do next visit:  Follow-up in 3 months to assess left knee pain.  She may make a sooner appointment should her left knee pain fail to improve.    The above stated was discussed in layman's terms and the patient expressed understanding.  All questions were answered to the patient's satisfaction.         Subjective:   Ibis Schmid is a 63 y.o. female who presents to the office today for her third and final in a 3-shot series of left knee Orthovisc injections.  She notes 5/10 left knee pain today.  She is ready to proceed with her final injection.      Review of systems negative unless otherwise specified in HPI    History reviewed. No pertinent past medical history.    Past Surgical History:   Procedure Laterality Date    ABLATION COLPOCLESIS      TUBAL LIGATION         Family History   Problem Relation Age of Onset    No Known Problems Mother     Diabetes Father     Glaucoma Father     No Known Problems Sister     No Known Problems Daughter     Breast cancer Maternal Grandmother         unknown age    No Known Problems Maternal Grandfather     Breast cancer Paternal Grandmother         unknown age    No Known Problems Paternal Grandfather     No Known Problems Son     No Known Problems Sister     No Known Problems Paternal Aunt        Social History     Occupational History    Not on file  "  Tobacco Use    Smoking status: Never    Smokeless tobacco: Never   Vaping Use    Vaping status: Never Used   Substance and Sexual Activity    Alcohol use: Yes     Alcohol/week: 1.0 standard drink of alcohol     Types: 1 Cans of beer per week     Comment: social    Drug use: Never    Sexual activity: Yes         Current Outpatient Medications:     albuterol (ProAir HFA) 90 mcg/act inhaler, Inhale 2 puffs every 6 (six) hours as needed for wheezing, Disp: 18 g, Rfl: 0    CALCIUM PO, Take by mouth, Disp: , Rfl:     Cholecalciferol (VITAMIN D-3 PO), Take by mouth, Disp: , Rfl:     Multiple Vitamins-Minerals (MULTIVITAMIN WITH MINERALS) tablet, Take 1 tablet by mouth daily, Disp: , Rfl:     omega-3-acid ethyl esters (LOVAZA) 1 g capsule, Take 1 g by mouth 2 (two) times a day, Disp: , Rfl:     traZODone (DESYREL) 100 mg tablet, Take 1.5 tablets (150 mg total) by mouth daily at bedtime, Disp: 135 tablet, Rfl: 3    Wegovy 2.4 MG/0.75ML, INJECT 0.75ML SUBCUTANEOUSLY ONCE WEEKLY, Disp: 3 mL, Rfl: 0    ketoconazole (NIZORAL) 2 % cream, Apply topically daily (Patient not taking: Reported on 5/10/2024), Disp: 60 g, Rfl: 0    Allergies   Allergen Reactions    Other Itching and Rash     Dust, bugs             Vitals:    05/28/24 0738   BP: 137/88   Pulse: 69       Objective:    General:  Patient is WDWN, alert and oriented, appears stated age, and is in no acute distress.    Musculoskeletal:    Left Knee:    Inspection:  The knee is without erythema or purulence indicative of infection which would preclude the patient from receiving a joint injection at today's visit.         Diagnostics, reviewed and taken today if performed as documented:    None performed        Procedures, if performed today:    Large joint arthrocentesis: L knee  Universal Protocol:  Consent: Verbal consent obtained.  Consent given by: patient  Time out: Immediately prior to procedure a \"time out\" was called to verify the correct patient, procedure, " "equipment, support staff and site/side marked as required.  Patient understanding: patient states understanding of the procedure being performed  Patient consent: the patient's understanding of the procedure matches consent given  Procedure consent: procedure consent matches procedure scheduled  Site marked: the operative site was marked  Patient identity confirmed: verbally with patient  Supporting Documentation  Indications: pain   Procedure Details  Location: knee - L knee  Preparation: Patient was prepped and draped in the usual sterile fashion  Needle size: 22 G  Ultrasound guidance: no  Approach: anterolateral  Medications administered: 30 mg sodium hyaluronate 30 mg/2 mL    Patient tolerance: patient tolerated the procedure well with no immediate complications  Dressing:  Sterile dressing applied        Portions of the record may have been created with voice recognition software.  Occasional wrong word or \"sound a like\" substitutions may have occurred due to the inherent limitations of voice recognition software.  Read the chart carefully and recognize, using context, where substitutions have occurred.  "

## 2024-06-10 ENCOUNTER — NURSE TRIAGE (OUTPATIENT)
Age: 64
End: 2024-06-10

## 2024-06-10 NOTE — TELEPHONE ENCOUNTER
"Pt called in. Pt currently on Wegovy 2.4mg. Pt states, \"I feel like I have plateaued.\" Reports weight has been unchanged since LOV on 5/14/24 at 195lb. Pt endorses some ongoing nausea.     Pt asking if she can also be prescribed metformin.    Call back: [phone] (okay to leave detailed VM)    Reason for Disposition   Nursing judgment    Answer Assessment - Initial Assessment Questions  1. REASON FOR CALL or QUESTION: \"What is your reason for calling today?\" or \"How can I best help you?\" or \"What question do you have that I can help answer?\"      Pt asking if she can have another medication with Wegovy    Protocols used: Information Only Call - No Triage-ADULT-[place]    "

## 2024-06-11 NOTE — TELEPHONE ENCOUNTER
Please let the patient know Adrian is currently out of the office, but we will update her when she returns next week.

## 2024-06-17 DIAGNOSIS — E66.09 CLASS 1 OBESITY DUE TO EXCESS CALORIES WITHOUT SERIOUS COMORBIDITY WITH BODY MASS INDEX (BMI) OF 30.0 TO 30.9 IN ADULT: Primary | ICD-10-CM

## 2024-06-17 DIAGNOSIS — R73.03 PREDIABETES: ICD-10-CM

## 2024-06-17 RX ORDER — TIRZEPATIDE 2.5 MG/.5ML
2.5 INJECTION, SOLUTION SUBCUTANEOUS WEEKLY
Qty: 2 ML | Refills: 0 | Status: SHIPPED | OUTPATIENT
Start: 2024-06-17 | End: 2024-07-15

## 2024-06-17 RX ORDER — METFORMIN HYDROCHLORIDE 750 MG/1
750 TABLET, EXTENDED RELEASE ORAL DAILY
Qty: 90 TABLET | Refills: 1 | Status: SHIPPED | OUTPATIENT
Start: 2024-06-17 | End: 2024-06-17

## 2024-06-21 ENCOUNTER — TELEPHONE (OUTPATIENT)
Age: 64
End: 2024-06-21

## 2024-06-21 NOTE — TELEPHONE ENCOUNTER
PA for ZEPBOUND    Submitted via    []CMM-KEY   [x]Surescripts-Case ID # [id number]   []Faxed to plan   []Other website   []Phone call Case ID #     Office notes sent, clinical questions answered. Awaiting determination    Turnaround time for your insurance to make a decision on your Prior Authorization can take 7-21 business days.

## 2024-06-24 NOTE — TELEPHONE ENCOUNTER
PA for ZEPBOUND Approved     Date(s) approved  June 21, 2024 to June 21, 2025     Case #    Patient advised by          [x] [employer] Message  [] Phone call   []LMOM  []L/M to call office as no active Communication consent on file  []Unable to leave detailed message as VM not approved on Communication consent       Pharmacy advised by    [x]Fax  []Phone call    Approval letter scanned into Media Yes

## 2024-07-03 ENCOUNTER — TELEPHONE (OUTPATIENT)
Age: 64
End: 2024-07-03

## 2024-07-03 NOTE — TELEPHONE ENCOUNTER
Caller: Patient    Doctor: Henrry    Reason for call: Patient calling to get scheduled w/ Dr Sales regarding a CSI in the left knee.    Patient was advised that the next available date in [place] is 8/6.  Patient was upset and stated she has not had to wait this long; offered to try to schedule with another provider or PA, but patient wants to see only Dr Sales in [place].    Patient would like to know if there is any way she can be squeezed into the schedule sooner than available appointment 8/6.  Please call patient back to advise.    Call back#: [phone]

## 2024-07-09 ENCOUNTER — OFFICE VISIT (OUTPATIENT)
Dept: OBGYN CLINIC | Facility: HOSPITAL | Age: 64
End: 2024-07-09
Payer: COMMERCIAL

## 2024-07-09 VITALS
HEIGHT: 67 IN | HEART RATE: 71 BPM | BODY MASS INDEX: 30.54 KG/M2 | SYSTOLIC BLOOD PRESSURE: 130 MMHG | DIASTOLIC BLOOD PRESSURE: 87 MMHG

## 2024-07-09 DIAGNOSIS — M17.12 PRIMARY OSTEOARTHRITIS OF LEFT KNEE: Primary | ICD-10-CM

## 2024-07-09 DIAGNOSIS — M25.562 CHRONIC PAIN OF LEFT KNEE: ICD-10-CM

## 2024-07-09 DIAGNOSIS — G89.29 CHRONIC PAIN OF LEFT KNEE: ICD-10-CM

## 2024-07-09 PROCEDURE — 20610 DRAIN/INJ JOINT/BURSA W/O US: CPT | Performed by: ORTHOPAEDIC SURGERY

## 2024-07-09 PROCEDURE — 99214 OFFICE O/P EST MOD 30 MIN: CPT | Performed by: ORTHOPAEDIC SURGERY

## 2024-07-09 RX ORDER — METHYLPREDNISOLONE ACETATE 40 MG/ML
2 INJECTION, SUSPENSION INTRA-ARTICULAR; INTRALESIONAL; INTRAMUSCULAR; SOFT TISSUE
Status: COMPLETED | OUTPATIENT
Start: 2024-07-09 | End: 2024-07-09

## 2024-07-09 RX ORDER — LIDOCAINE HYDROCHLORIDE 10 MG/ML
4 INJECTION, SOLUTION INFILTRATION; PERINEURAL
Status: COMPLETED | OUTPATIENT
Start: 2024-07-09 | End: 2024-07-09

## 2024-07-09 RX ORDER — METFORMIN HYDROCHLORIDE 750 MG/1
TABLET, EXTENDED RELEASE ORAL
COMMUNITY
Start: 2024-06-17

## 2024-07-09 RX ADMIN — METHYLPREDNISOLONE ACETATE 2 ML: 40 INJECTION, SUSPENSION INTRA-ARTICULAR; INTRALESIONAL; INTRAMUSCULAR; SOFT TISSUE at 07:00

## 2024-07-09 RX ADMIN — LIDOCAINE HYDROCHLORIDE 4 ML: 10 INJECTION, SOLUTION INFILTRATION; PERINEURAL at 07:00

## 2024-07-09 NOTE — PROGRESS NOTES
"Assessment:   Diagnosis ICD-10-CM Associated Orders   1. Primary osteoarthritis of left knee  M17.12 Ambulatory Referral to Orthopedic Surgery      2. Chronic pain of left knee  M25.562 Ambulatory Referral to Orthopedic Surgery    G89.29           Plan:  Due to the patient's mechanical symptoms of locking in her knee, there is concern that she may have a cartilage or meniscus tear.  However, because her left knee osteoarthritis is so severe, there is no guarantee that an arthroscopic surgery would improve her symptoms.  It is likely that her best option would be to pursue knee replacement surgery.  The patient is open to discussing this for scheduling in the fall.  We have referred her to see Dr. Akhtar for this.  The patient was offered and accepted a left knee CSI today.  The procedure was tolerated well without any immediate complications.    To do next visit:  Follow-up with Dr. Akhtar to discuss knee replacement surgery for the fall.    The above stated was discussed in layman's terms and the patient expressed understanding.  All questions were answered to the patient's satisfaction.         Scribe Attestation      I,:  Adriana Barnes PA-C am acting as a scribe while in the presence of the attending physician.:       I,:  Pancho Sales MD personally performed the services described in this documentation    as scribed in my presence.:             Subjective:   Ibis Schmid is a 63 y.o. female who presents to the office today for a follow-up appointment for left knee pain.  She recently underwent a 3-shot series of left knee Orthovisc injections 6 weeks ago ending 5/28/24.  These injections never provided her with her desired results, and she actually thinks the injections may have made her symptoms worse.  Today she is having swelling in the knee and pain pain which radiates up her thigh.  She reports \"burning\" in her knee, as well as a \"locking\" sensation in her knee at night.  She sometimes has " locking during the day, as well.  She has trouble going up and down stairs.      Review of systems negative unless otherwise specified in HPI    History reviewed. No pertinent past medical history.    Past Surgical History:   Procedure Laterality Date    ABLATION COLPOCLESIS      TUBAL LIGATION         Family History   Problem Relation Age of Onset    No Known Problems Mother     Diabetes Father     Glaucoma Father     No Known Problems Sister     No Known Problems Daughter     Breast cancer Maternal Grandmother         unknown age    No Known Problems Maternal Grandfather     Breast cancer Paternal Grandmother         unknown age    No Known Problems Paternal Grandfather     No Known Problems Son     No Known Problems Sister     No Known Problems Paternal Aunt        Social History     Occupational History    Not on file   Tobacco Use    Smoking status: Never    Smokeless tobacco: Never   Vaping Use    Vaping status: Never Used   Substance and Sexual Activity    Alcohol use: Yes     Alcohol/week: 1.0 standard drink of alcohol     Types: 1 Cans of beer per week     Comment: social    Drug use: Never    Sexual activity: Yes         Current Outpatient Medications:     albuterol (ProAir HFA) 90 mcg/act inhaler, Inhale 2 puffs every 6 (six) hours as needed for wheezing, Disp: 18 g, Rfl: 0    CALCIUM PO, Take by mouth, Disp: , Rfl:     Cholecalciferol (VITAMIN D-3 PO), Take by mouth, Disp: , Rfl:     metFORMIN (GLUCOPHAGE-XR) 750 mg 24 hr tablet, , Disp: , Rfl:     Multiple Vitamins-Minerals (MULTIVITAMIN WITH MINERALS) tablet, Take 1 tablet by mouth daily, Disp: , Rfl:     omega-3-acid ethyl esters (LOVAZA) 1 g capsule, Take 1 g by mouth 2 (two) times a day, Disp: , Rfl:     tirzepatide (Zepbound) 2.5 mg/0.5 mL auto-injector, Inject 0.5 mL (2.5 mg total) under the skin once a week for 28 days, Disp: 2 mL, Rfl: 0    traZODone (DESYREL) 100 mg tablet, Take 1.5 tablets (150 mg total) by mouth daily at bedtime, Disp: 135  "tablet, Rfl: 3    ketoconazole (NIZORAL) 2 % cream, Apply topically daily (Patient not taking: Reported on 5/10/2024), Disp: 60 g, Rfl: 0    Allergies   Allergen Reactions    Other Itching and Rash     Dust, bugs             Vitals:    07/09/24 0707   BP: 130/87   Pulse: 71       Objective:    General:  Patient is WDWN, alert and oriented, appears stated age, and is in no acute distress.    Musculoskeletal:    Left Knee:    Inspection:  The knee appears mildly edematous.  No erythema or purulence indicative of infection.    Range of Motion:  The patient is able to achieve full active knee extension with pain.  She is able to achieve approximately 100 degrees of knee flexion with pain.    Palpation:  No palpable knee effusion.    She is tender over the medial joint line.    Special Tests:  Pain with medial ballottement testing.        Diagnostics, reviewed and taken today if performed as documented:    The attending physician has personally reviewed the pertinent films in PACS and interpretation is as follows:  Left Knee X-rays 4/18/23:  Moderate to severe left knee medial joint space osteoarthritis.      Procedures, if performed today:    Large joint arthrocentesis: L knee  Universal Protocol:  Consent: Verbal consent obtained.  Consent given by: patient  Time out: Immediately prior to procedure a \"time out\" was called to verify the correct patient, procedure, equipment, support staff and site/side marked as required.  Patient understanding: patient states understanding of the procedure being performed  Patient consent: the patient's understanding of the procedure matches consent given  Procedure consent: procedure consent matches procedure scheduled  Site marked: the operative site was marked  Patient identity confirmed: verbally with patient  Supporting Documentation  Indications: pain   Procedure Details  Location: knee - L knee  Needle size: 22 G  Ultrasound guidance: no  Approach: anterolateral  Medications " "administered: 4 mL lidocaine 1 %; 2 mL methylPREDNISolone acetate 40 mg/mL    Patient tolerance: patient tolerated the procedure well with no immediate complications  Dressing:  Sterile dressing applied        Portions of the record may have been created with voice recognition software.  Occasional wrong word or \"sound a like\" substitutions may have occurred due to the inherent limitations of voice recognition software.  Read the chart carefully and recognize, using context, where substitutions have occurred.  "

## 2024-07-18 ENCOUNTER — TELEPHONE (OUTPATIENT)
Age: 64
End: 2024-07-18

## 2024-07-18 DIAGNOSIS — Z12.31 ENCOUNTER FOR SCREENING MAMMOGRAM FOR MALIGNANT NEOPLASM OF BREAST: Primary | ICD-10-CM

## 2024-07-18 NOTE — TELEPHONE ENCOUNTER
Patient called to request an order for screening mammo. Please advise and contact pt once order is in.

## 2024-07-19 ENCOUNTER — TELEPHONE (OUTPATIENT)
Age: 64
End: 2024-07-19

## 2024-07-19 DIAGNOSIS — E66.09 CLASS 1 OBESITY DUE TO EXCESS CALORIES WITHOUT SERIOUS COMORBIDITY WITH BODY MASS INDEX (BMI) OF 30.0 TO 30.9 IN ADULT: Primary | ICD-10-CM

## 2024-07-19 RX ORDER — TIRZEPATIDE 5 MG/.5ML
5 INJECTION, SOLUTION SUBCUTANEOUS WEEKLY
Qty: 2 ML | Refills: 1 | Status: SHIPPED | OUTPATIENT
Start: 2024-07-19 | End: 2024-09-13

## 2024-07-19 NOTE — TELEPHONE ENCOUNTER
Reason for call:   [x] Refill   [] Prior Auth  [x] Other: Pt called she states she needs the next dose of the zepbound and the rx to go to [facility] [place]. Please and thank you!    Office:   [] PCP/Provider -   [x] Specialty/Provider -   Adrian Isabel PA-C  Bariatrics    Medication: zepboud    Pharmacy: [facility] [place]  [address] [place] [address] A

## 2024-07-24 NOTE — TELEPHONE ENCOUNTER
Patient called to check the status of her refill request for Zepbound 5mg. Patient made aware medication was refilled on 07/19/24 for 2mL with 1 refill at [facility] pharmacy. Patient instructed to contact the pharmacy to obtain refills of medication. Patient verbalized understanding.

## 2024-08-15 ENCOUNTER — OFFICE VISIT (OUTPATIENT)
Dept: INTERNAL MEDICINE CLINIC | Age: 64
End: 2024-08-15
Payer: COMMERCIAL

## 2024-08-15 VITALS
BODY MASS INDEX: 31.08 KG/M2 | HEIGHT: 67 IN | WEIGHT: 198 LBS | DIASTOLIC BLOOD PRESSURE: 66 MMHG | SYSTOLIC BLOOD PRESSURE: 124 MMHG | OXYGEN SATURATION: 98 % | HEART RATE: 60 BPM | TEMPERATURE: 97.6 F

## 2024-08-15 DIAGNOSIS — D50.8 IRON DEFICIENCY ANEMIA SECONDARY TO INADEQUATE DIETARY IRON INTAKE: ICD-10-CM

## 2024-08-15 DIAGNOSIS — F40.243 FEAR OF FLYING: ICD-10-CM

## 2024-08-15 DIAGNOSIS — L30.9 DERMATITIS: ICD-10-CM

## 2024-08-15 DIAGNOSIS — F51.01 PRIMARY INSOMNIA: ICD-10-CM

## 2024-08-15 DIAGNOSIS — R53.83 FATIGUE, UNSPECIFIED TYPE: Primary | ICD-10-CM

## 2024-08-15 PROCEDURE — 99214 OFFICE O/P EST MOD 30 MIN: CPT | Performed by: PHYSICIAN ASSISTANT

## 2024-08-15 RX ORDER — LORAZEPAM 0.5 MG/1
0.5 TABLET ORAL DAILY PRN
Qty: 6 TABLET | Refills: 0 | Status: SHIPPED | OUTPATIENT
Start: 2024-08-15

## 2024-08-15 RX ORDER — TRAZODONE HYDROCHLORIDE 100 MG/1
150 TABLET ORAL
Qty: 135 TABLET | Refills: 3 | Status: SHIPPED | OUTPATIENT
Start: 2024-08-15

## 2024-08-15 RX ORDER — KETOCONAZOLE 20 MG/G
CREAM TOPICAL DAILY
Qty: 60 G | Refills: 5 | Status: SHIPPED | OUTPATIENT
Start: 2024-08-15

## 2024-08-15 NOTE — PROGRESS NOTES
Assessment/Plan:         Diagnoses and all orders for this visit:    Fatigue, unspecified type  Comments:  check labs  encourage daily exercise  Orders:  -     Comprehensive metabolic panel; Future  -     CBC and differential; Future  -     Iron Panel (Includes Ferritin, Iron Sat%, Iron, and TIBC); Future    Primary insomnia  Comments:  increase trazodone to 150mg qhs  Orders:  -     traZODone (DESYREL) 100 mg tablet; Take 1.5 tablets (150 mg total) by mouth daily at bedtime  -     Comprehensive metabolic panel; Future  -     CBC and differential; Future  -     Iron Panel (Includes Ferritin, Iron Sat%, Iron, and TIBC); Future    Dermatitis  Comments:  ketoconazlole daily  f/u in 1-2 weeks if not resolved  Orders:  -     ketoconazole (NIZORAL) 2 % cream; Apply topically daily    Iron deficiency anemia secondary to inadequate dietary iron intake  -     Comprehensive metabolic panel; Future  -     CBC and differential; Future  -     Iron Panel (Includes Ferritin, Iron Sat%, Iron, and TIBC); Future    Fear of flying  -     LORazepam (Ativan) 0.5 mg tablet; Take 1 tablet (0.5 mg total) by mouth daily as needed for anxiety          Subjective:      Patient ID: Ibis Schmid is a 63 y.o. female.    62 y/o female with hx of shant, insomnia, impaired fbs, htn present for f/u   Pt has started zepbound 2 months ago and is exercising and has lost inches   Pt reports fatigue - labs reviewed from May   B12 stable     Pt reports flight anxiety, has to travel next month and is worried with this          The following portions of the patient's history were reviewed and updated as appropriate: allergies, current medications, past family history, past medical history, past social history, past surgical history, and problem list.    Review of Systems   Constitutional:  Positive for fatigue. Negative for activity change, appetite change, chills and fever.   HENT:  Negative for congestion and sore throat.    Eyes:  Negative for pain and  redness.   Respiratory:  Negative for cough and shortness of breath.    Cardiovascular:  Negative for chest pain and leg swelling.   Gastrointestinal:  Negative for abdominal pain, constipation, diarrhea and nausea.   Genitourinary:  Negative for dysuria and frequency.   Musculoskeletal:  Negative for arthralgias, back pain and gait problem.   Skin:  Negative for rash.   Neurological:  Negative for dizziness, light-headedness and headaches.   Psychiatric/Behavioral:  Negative for sleep disturbance. The patient is not nervous/anxious.          History reviewed. No pertinent past medical history.      Current Outpatient Medications:     CALCIUM PO, Take by mouth, Disp: , Rfl:     Cholecalciferol (VITAMIN D-3 PO), Take by mouth, Disp: , Rfl:     ketoconazole (NIZORAL) 2 % cream, Apply topically daily, Disp: 60 g, Rfl: 5    LORazepam (Ativan) 0.5 mg tablet, Take 1 tablet (0.5 mg total) by mouth daily as needed for anxiety, Disp: 6 tablet, Rfl: 0    metFORMIN (GLUCOPHAGE-XR) 750 mg 24 hr tablet, , Disp: , Rfl:     Multiple Vitamins-Minerals (MULTIVITAMIN WITH MINERALS) tablet, Take 1 tablet by mouth daily, Disp: , Rfl:     omega-3-acid ethyl esters (LOVAZA) 1 g capsule, Take 1 g by mouth 2 (two) times a day, Disp: , Rfl:     tirzepatide (Zepbound) 5 mg/0.5 mL auto-injector, Inject 0.5 mL (5 mg total) under the skin once a week, Disp: 2 mL, Rfl: 1    traZODone (DESYREL) 100 mg tablet, Take 1.5 tablets (150 mg total) by mouth daily at bedtime, Disp: 135 tablet, Rfl: 3    albuterol (ProAir HFA) 90 mcg/act inhaler, Inhale 2 puffs every 6 (six) hours as needed for wheezing (Patient not taking: Reported on 8/15/2024), Disp: 18 g, Rfl: 0    Allergies   Allergen Reactions    Other Itching and Rash     Dust, bugs        Social History   Past Surgical History:   Procedure Laterality Date    ABLATION COLPOCLESIS      TUBAL LIGATION       Family History   Problem Relation Age of Onset    No Known Problems Mother     Diabetes Father   "   Glaucoma Father     No Known Problems Sister     No Known Problems Daughter     Breast cancer Maternal Grandmother         unknown age    No Known Problems Maternal Grandfather     Breast cancer Paternal Grandmother         unknown age    No Known Problems Paternal Grandfather     No Known Problems Son     No Known Problems Sister     No Known Problems Paternal Aunt        Objective:  /66 (BP Location: Left arm, Patient Position: Sitting, Cuff Size: Standard)   Pulse 60   Temp 97.6 °F (36.4 °C) (Temporal)   Ht 5' 7\" (1.702 m)   Wt 89.8 kg (198 lb)   LMP  (LMP Unknown)   SpO2 98%   BMI 31.01 kg/m²        Physical Exam  Vitals reviewed.   Constitutional:       General: She is not in acute distress.  HENT:      Head: Normocephalic and atraumatic.      Nose: Nose normal.   Eyes:      General:         Right eye: No discharge.         Left eye: No discharge.      Extraocular Movements: Extraocular movements intact.      Conjunctiva/sclera: Conjunctivae normal.      Pupils: Pupils are equal, round, and reactive to light.   Cardiovascular:      Rate and Rhythm: Normal rate and regular rhythm.   Pulmonary:      Effort: Pulmonary effort is normal. No respiratory distress.      Breath sounds: Normal breath sounds. No wheezing or rales.   Musculoskeletal:      Cervical back: Normal range of motion. No rigidity.   Lymphadenopathy:      Cervical: No cervical adenopathy.   Neurological:      Mental Status: She is alert.         "

## 2024-08-26 ENCOUNTER — NURSE TRIAGE (OUTPATIENT)
Age: 64
End: 2024-08-26

## 2024-08-26 DIAGNOSIS — E66.09 CLASS 1 OBESITY DUE TO EXCESS CALORIES WITHOUT SERIOUS COMORBIDITY WITH BODY MASS INDEX (BMI) OF 30.0 TO 30.9 IN ADULT: Primary | ICD-10-CM

## 2024-08-26 RX ORDER — TIRZEPATIDE 7.5 MG/.5ML
7.5 INJECTION, SOLUTION SUBCUTANEOUS WEEKLY
Qty: 2 ML | Refills: 1 | Status: SHIPPED | OUTPATIENT
Start: 2024-08-26 | End: 2024-10-21

## 2024-08-26 NOTE — TELEPHONE ENCOUNTER
"Pt called in requesting next dose of zepbound. Pt currently on 5mg. Prescribed by ALESSIO Isabel PA-C. Pt denies any adverse side effects. Reports losing approx 10lbs. Please send script to [facility] [place].    Reason for Disposition   Nursing judgment    Answer Assessment - Initial Assessment Questions  1. REASON FOR CALL or QUESTION: \"What is your reason for calling today?\" or \"How can I best help you?\" or \"What question do you have that I can help answer?\"      Requesting next dose of zepbound    Protocols used: Information Only Call - No Triage-ADULT-[place]    "

## 2024-09-17 DIAGNOSIS — F40.232 PHOBIA OF DENTAL PROCEDURE: Primary | ICD-10-CM

## 2024-09-17 RX ORDER — LORAZEPAM 1 MG/1
1 TABLET ORAL EVERY 8 HOURS PRN
Qty: 20 TABLET | Refills: 0 | Status: SHIPPED | OUTPATIENT
Start: 2024-09-17

## 2024-09-24 DIAGNOSIS — E66.811 CLASS 1 OBESITY DUE TO EXCESS CALORIES WITHOUT SERIOUS COMORBIDITY WITH BODY MASS INDEX (BMI) OF 30.0 TO 30.9 IN ADULT: ICD-10-CM

## 2024-09-24 DIAGNOSIS — E66.09 CLASS 1 OBESITY DUE TO EXCESS CALORIES WITHOUT SERIOUS COMORBIDITY WITH BODY MASS INDEX (BMI) OF 30.0 TO 30.9 IN ADULT: ICD-10-CM

## 2024-09-24 NOTE — TELEPHONE ENCOUNTER
Patient of Adrian Isabel PA-C. Patient is requesting increase in her Zepbound from the 5 mg to 7.5 mg. Will be using her last dose on Thursday. Had called on 8/26/24 with same request. Doing well. No symptoms. No issues presently. Please send to  [facility] and call patient when sent. Thank you.

## 2024-09-25 RX ORDER — TIRZEPATIDE 7.5 MG/.5ML
7.5 INJECTION, SOLUTION SUBCUTANEOUS WEEKLY
Qty: 2 ML | Refills: 1 | Status: SHIPPED | OUTPATIENT
Start: 2024-09-25 | End: 2024-11-20

## 2024-10-04 ENCOUNTER — TELEPHONE (OUTPATIENT)
Dept: BARIATRICS | Facility: CLINIC | Age: 64
End: 2024-10-04

## 2024-10-16 DIAGNOSIS — K04.7 CHRONIC DENTAL INFECTION: Primary | ICD-10-CM

## 2024-10-16 RX ORDER — SULFAMETHOXAZOLE AND TRIMETHOPRIM 800; 160 MG/1; MG/1
1 TABLET ORAL EVERY 12 HOURS SCHEDULED
Qty: 20 TABLET | Refills: 0 | Status: SHIPPED | OUTPATIENT
Start: 2024-10-16 | End: 2024-10-26

## 2024-10-26 DIAGNOSIS — R73.03 PREDIABETES: Primary | ICD-10-CM

## 2024-10-29 RX ORDER — METFORMIN HYDROCHLORIDE 750 MG/1
TABLET, EXTENDED RELEASE ORAL
Qty: 90 TABLET | Refills: 0 | Status: SHIPPED | OUTPATIENT
Start: 2024-10-29

## 2024-10-29 NOTE — TELEPHONE ENCOUNTER
Patient  of Adrian calling in to check status of Metformin refill. She stated she has not taken in 2 weeks because she has been waiting for it to be submitted to the pharmacy.    Advised that message had been forwarded to provider to submit order.    Patient would like medication sent to [facility].    #[phone]

## 2024-11-08 ENCOUNTER — OFFICE VISIT (OUTPATIENT)
Dept: BARIATRICS | Facility: CLINIC | Age: 64
End: 2024-11-08

## 2024-11-08 VITALS
BODY MASS INDEX: 31.17 KG/M2 | WEIGHT: 198.6 LBS | DIASTOLIC BLOOD PRESSURE: 80 MMHG | HEART RATE: 75 BPM | TEMPERATURE: 97.9 F | SYSTOLIC BLOOD PRESSURE: 130 MMHG | HEIGHT: 67 IN

## 2024-11-08 DIAGNOSIS — I10 BENIGN ESSENTIAL HYPERTENSION: ICD-10-CM

## 2024-11-08 DIAGNOSIS — E66.811 CLASS 1 OBESITY DUE TO EXCESS CALORIES WITHOUT SERIOUS COMORBIDITY WITH BODY MASS INDEX (BMI) OF 30.0 TO 30.9 IN ADULT: Primary | ICD-10-CM

## 2024-11-08 DIAGNOSIS — E66.09 CLASS 1 OBESITY DUE TO EXCESS CALORIES WITHOUT SERIOUS COMORBIDITY WITH BODY MASS INDEX (BMI) OF 30.0 TO 30.9 IN ADULT: Primary | ICD-10-CM

## 2024-11-08 DIAGNOSIS — R73.03 PRE-DIABETES: ICD-10-CM

## 2024-11-08 RX ORDER — TIRZEPATIDE 10 MG/.5ML
10 INJECTION, SOLUTION SUBCUTANEOUS WEEKLY
Qty: 2 ML | Refills: 3 | Status: SHIPPED | OUTPATIENT
Start: 2024-11-08 | End: 2025-02-28

## 2024-11-08 NOTE — PROGRESS NOTES
Assessment/Plan:    1. Class 1 obesity due to excess calories without serious comorbidity with body mass index (BMI) of 30.0 to 30.9 in adult  tirzepatide (Zepbound) 10 mg/0.5 mL auto-injector      2. Pre-diabetes        3. Benign essential hypertension            Initial: 217.2 lbs  Last visit: 195 (5/2024)  Current:  198 lbs (11/8/24)  Goal: 160 lbs    - Weight not at goal  - Patient is interested in Conservative Program  - Labs reviewed: As below.    General Recommendations:  Nutrition:  Eat breakfast daily.  Do not skip meals.     Food log (ie.) www."[employer].com, sparkpeople.com, loseit.com, calorieking.com, etc.    Practice mindful eating.  Be sure to set aside time to eat, eat slowly, and savor your food.    Hydration:    At least 64oz of water daily.  No sugar sweetened beverages.  No juice (eat the fruit instead).    Exercise:  Studies have shown that the ideal exercise goal is somewhere between 150 to 300 minutes of moderate intensity exercise a week.  Start with exercising 10 minutes every other day and gradually increase physical activity with a goal of at least 150 minutes of moderate intensity exercise a week, divided over at least 3 days a week.  An example of this would be exercising 30 minutes a day, 5 days a week.  Resistance training can increase muscle mass and increase our resting metabolic rate.   FULL BODY resistance training is recommended 2-3 times a week.  Do not do this on consecutive days to allow for muscle recovery.    Aim for a bare minimum 5000 steps, even on days you do not exercise.    Monitoring:   Weigh yourself daily.  If this causes undue stress, then just weigh yourself once a week.  Weigh yourself the same time of the day with the same amount of clothing on.  Preferably this should be done after waking up, before you eat, and with no clothing or minimal clothing on.    Calorie goal:  [id number] veena/day    Return visit:    Calorie tracking/deficit  Physical activity  goals  AOM  Continue zepbound - increase to 10mg  Continue metformin  RTC:  4 months        Subjective:   Chief Complaint   Patient presents with    Follow-up     Mwm 5mth f/u        Patient ID: Ibis Schmid  is a 64 y.o. female with excess weight/obesity here to pursue weight managment.  Patient is pursuing Conservative Program.     HPI    Previously took saxenda  Plateaued on wegovy 2.4  Recently transitioned to zepbound - currently on 7.5 mg. No adverse side effects.      Food recall 11/8/24  B: hard boiled eggs + turkey dela cruz  S: jello or cheese/cracker  L salad + chicken OR soup  S: skip or jello or cheese  D:  salad or meatloaf  S: skip         Wt Readings from Last 10 Encounters:   11/08/24 90.1 kg (198 lb 9.6 oz)   08/15/24 89.8 kg (198 lb)   05/28/24 88.5 kg (195 lb)   05/21/24 88.5 kg (195 lb)   05/14/24 88.5 kg (195 lb)   05/10/24 88.5 kg (195 lb)   02/09/24 86.4 kg (190 lb 6.4 oz)   01/30/24 88.2 kg (194 lb 6.4 oz)   01/25/24 87.6 kg (193 lb 3.2 oz)   08/11/23 91.6 kg (202 lb)       Food logging:not currently  Increased appetite/cravings:controlled  Exercise:walking regularly, has peloton but not using currently  Hydration:at least 64 oz water, has cut back on alcohol intake        The following portions of the patient's history were reviewed and updated as appropriate: She  has no past medical history on file.  She   Patient Active Problem List    Diagnosis Date Noted    Primary osteoarthritis of left knee 04/02/2024    Class 1 obesity due to excess calories without serious comorbidity with body mass index (BMI) of 30.0 to 30.9 in adult 12/30/2022    Prediabetes 12/30/2022    Bursitis of left knee 07/23/2021    Acute pain of left knee 07/23/2021    COVID-19 12/01/2020    Benign essential hypertension 12/04/2014    Anxiety state 02/20/2014    Leiomyoma of uterus 11/21/2013     She  has a past surgical history that includes Tubal ligation and Ablation colpoclesis.  Her family history includes Breast  cancer in her maternal grandmother and paternal grandmother; Diabetes in her father; Glaucoma in her father; No Known Problems in her daughter, maternal grandfather, mother, paternal aunt, paternal grandfather, sister, sister, and son.  She  reports that she has never smoked. She has never used smokeless tobacco. She reports current alcohol use of about 1.0 standard drink of alcohol per week. She reports that she does not use drugs.  Current Outpatient Medications   Medication Sig Dispense Refill    tirzepatide (Zepbound) 10 mg/0.5 mL auto-injector Inject 0.5 mL (10 mg total) under the skin once a week 2 mL 3    albuterol (ProAir HFA) 90 mcg/act inhaler Inhale 2 puffs every 6 (six) hours as needed for wheezing (Patient not taking: Reported on 8/15/2024) 18 g 0    CALCIUM PO Take by mouth      Cholecalciferol (VITAMIN D-3 PO) Take by mouth      ketoconazole (NIZORAL) 2 % cream Apply topically daily 60 g 5    LORazepam (ATIVAN) 1 mg tablet Take 1 tablet (1 mg total) by mouth every 8 (eight) hours as needed for anxiety for up to 20 doses 20 tablet 0    metFORMIN (GLUCOPHAGE-XR) 750 mg 24 hr tablet Take 1 tablet (750 mg total) by mouth daily 90 tablet 0    Multiple Vitamins-Minerals (MULTIVITAMIN WITH MINERALS) tablet Take 1 tablet by mouth daily      omega-3-acid ethyl esters (LOVAZA) 1 g capsule Take 1 g by mouth 2 (two) times a day      traZODone (DESYREL) 100 mg tablet Take 1.5 tablets (150 mg total) by mouth daily at bedtime 135 tablet 3     No current facility-administered medications for this visit.     Current Outpatient Medications on File Prior to Visit   Medication Sig    albuterol (ProAir HFA) 90 mcg/act inhaler Inhale 2 puffs every 6 (six) hours as needed for wheezing (Patient not taking: Reported on 8/15/2024)    CALCIUM PO Take by mouth    Cholecalciferol (VITAMIN D-3 PO) Take by mouth    ketoconazole (NIZORAL) 2 % cream Apply topically daily    LORazepam (ATIVAN) 1 mg tablet Take 1 tablet (1 mg total) by  "mouth every 8 (eight) hours as needed for anxiety for up to 20 doses    metFORMIN (GLUCOPHAGE-XR) 750 mg 24 hr tablet Take 1 tablet (750 mg total) by mouth daily    Multiple Vitamins-Minerals (MULTIVITAMIN WITH MINERALS) tablet Take 1 tablet by mouth daily    omega-3-acid ethyl esters (LOVAZA) 1 g capsule Take 1 g by mouth 2 (two) times a day    traZODone (DESYREL) 100 mg tablet Take 1.5 tablets (150 mg total) by mouth daily at bedtime    [DISCONTINUED] tirzepatide (Zepbound) 7.5 mg/0.5 mL auto-injector Inject 0.5 mL (7.5 mg total) under the skin once a week     No current facility-administered medications on file prior to visit.     She is allergic to other..    Review of Systems   Constitutional:  Negative for fever.   Respiratory:  Negative for shortness of breath.    Cardiovascular:  Negative for chest pain and palpitations.   Gastrointestinal:  Negative for abdominal pain, constipation, diarrhea and vomiting.   Genitourinary:  Negative for difficulty urinating.   Musculoskeletal:  Positive for arthralgias.   Skin:  Negative for rash.   Neurological:  Negative for headaches.   Psychiatric/Behavioral:  Negative for dysphoric mood. The patient is not nervous/anxious.        Objective:    /80   Pulse 75   Temp 97.9 °F (36.6 °C) (Tympanic)   Ht 5' 7\" (1.702 m)   Wt 90.1 kg (198 lb 9.6 oz)   LMP  (LMP Unknown)   BMI 31.11 kg/m²      Physical Exam  Vitals and nursing note reviewed.   Constitutional:       General: She is not in acute distress.     Appearance: She is well-developed. She is obese.   HENT:      Head: Normocephalic and atraumatic.   Eyes:      Conjunctiva/sclera: Conjunctivae normal.   Neck:      Thyroid: No thyromegaly.   Pulmonary:      Effort: Pulmonary effort is normal. No respiratory distress.   Skin:     Findings: No rash (visible).   Neurological:      Mental Status: She is alert and oriented to person, place, and time.   Psychiatric:         Mood and Affect: Mood normal.         " Behavior: Behavior normal.     ,m

## 2024-11-19 ENCOUNTER — OFFICE VISIT (OUTPATIENT)
Dept: OBGYN CLINIC | Facility: HOSPITAL | Age: 64
End: 2024-11-19
Payer: COMMERCIAL

## 2024-11-19 ENCOUNTER — HOSPITAL ENCOUNTER (OUTPATIENT)
Dept: RADIOLOGY | Facility: HOSPITAL | Age: 64
Discharge: HOME | End: 2024-11-19
Attending: ORTHOPAEDIC SURGERY
Payer: COMMERCIAL

## 2024-11-19 VITALS
SYSTOLIC BLOOD PRESSURE: 156 MMHG | DIASTOLIC BLOOD PRESSURE: 111 MMHG | BODY MASS INDEX: 31.11 KG/M2 | HEIGHT: 67 IN | HEART RATE: 94 BPM

## 2024-11-19 DIAGNOSIS — M17.12 PRIMARY OSTEOARTHRITIS OF LEFT KNEE: ICD-10-CM

## 2024-11-19 DIAGNOSIS — M17.12 PRIMARY OSTEOARTHRITIS OF LEFT KNEE: Primary | ICD-10-CM

## 2024-11-19 PROCEDURE — 99214 OFFICE O/P EST MOD 30 MIN: CPT | Performed by: ORTHOPAEDIC SURGERY

## 2024-11-19 PROCEDURE — 73560 X-RAY EXAM OF KNEE 1 OR 2: CPT

## 2024-11-19 PROCEDURE — 20610 DRAIN/INJ JOINT/BURSA W/O US: CPT | Performed by: ORTHOPAEDIC SURGERY

## 2024-11-19 RX ORDER — BUPIVACAINE HYDROCHLORIDE 2.5 MG/ML
4 INJECTION, SOLUTION INFILTRATION; PERINEURAL
Status: COMPLETED | OUTPATIENT
Start: 2024-11-19 | End: 2024-11-19

## 2024-11-19 RX ORDER — TRIAMCINOLONE ACETONIDE 40 MG/ML
80 INJECTION, SUSPENSION INTRA-ARTICULAR; INTRAMUSCULAR
Status: COMPLETED | OUTPATIENT
Start: 2024-11-19 | End: 2024-11-19

## 2024-11-19 RX ADMIN — TRIAMCINOLONE ACETONIDE 80 MG: 40 INJECTION, SUSPENSION INTRA-ARTICULAR; INTRAMUSCULAR at 11:30

## 2024-11-19 RX ADMIN — BUPIVACAINE HYDROCHLORIDE 4 ML: 2.5 INJECTION, SOLUTION INFILTRATION; PERINEURAL at 11:30

## 2024-11-19 NOTE — PROGRESS NOTES
Assessment/Plan:   Diagnoses and all orders for this visit:    Primary osteoarthritis of left knee  -     XR knee 1 or 2 vw left; Future  -     Large joint arthrocentesis: L knee  -     Ambulatory referral to Orthopedic Surgery; Future    Discussed with patient at today's physical exam and x-ray findings are consistent with primary osteoarthritis of left knee. Reviewed left knee arthroscopy versus left knee arthroplasty. Patient was offered, and accepted, Depo-Medrol and lidocaine injection(s) to the left knee for relief of pain and inflammation.  Patient tolerated treatment(s) well. She is being provided a referral to Dr. Rosas to discuss potential left knee arthroscopy. If she decides to instead move forward with left knee arthroplasty, she has a referral to Dr. Akhtar in this regard. She will be seen moving forward on as-needed basis. Patient expresses understanding and is in agreement with this treatment plan.    Subjective:   Patient ID: Ibis Schmid  1960     HPI  Patient is a 64 y.o. female who presents for evaluation of acute exacerbation of primary osteoarthritis of left knee. Patient states that she was experiencing minimal pain with ADLs. On Saturday 11/16/2024 she reports wearing heels, and the following day she experienced significant increase in anterior medial knee pain and swelling. Since that time, she reports an inability to actively flex and extend without significant pain exacerbation. She reports that she walks with a limp. She has been taking over-the-counter Tylenol and ibuprofen without significant symptom relief.     The following portions of the patient's history were reviewed and updated as appropriate:  Past medical history, past surgical history, Family history, social history, current medications and allergies    History reviewed. No pertinent past medical history.    Past Surgical History:   Procedure Laterality Date    ABLATION COLPOCLESIS      TUBAL LIGATION          Family History   Problem Relation Age of Onset    No Known Problems Mother     Diabetes Father     Glaucoma Father     No Known Problems Sister     No Known Problems Daughter     Breast cancer Maternal Grandmother         unknown age    No Known Problems Maternal Grandfather     Breast cancer Paternal Grandmother         unknown age    No Known Problems Paternal Grandfather     No Known Problems Son     No Known Problems Sister     No Known Problems Paternal Aunt        Social History     Socioeconomic History    Marital status:      Spouse name: None    Number of children: None    Years of education: None    Highest education level: None   Occupational History    None   Tobacco Use    Smoking status: Never    Smokeless tobacco: Never   Vaping Use    Vaping status: Never Used   Substance and Sexual Activity    Alcohol use: Yes     Alcohol/week: 1.0 standard drink of alcohol     Types: 1 Cans of beer per week     Comment: social    Drug use: Never    Sexual activity: Yes   Other Topics Concern    None   Social History Narrative    None     Social Drivers of Health     Financial Resource Strain: Not on file   Food Insecurity: Not on file   Transportation Needs: Not on file   Physical Activity: Not on file   Stress: Not on file   Social Connections: Not on file   Intimate Partner Violence: Not on file   Housing Stability: Not on file         Current Outpatient Medications:     CALCIUM PO, Take by mouth, Disp: , Rfl:     Cholecalciferol (VITAMIN D-3 PO), Take by mouth, Disp: , Rfl:     ketoconazole (NIZORAL) 2 % cream, Apply topically daily, Disp: 60 g, Rfl: 5    LORazepam (ATIVAN) 1 mg tablet, Take 1 tablet (1 mg total) by mouth every 8 (eight) hours as needed for anxiety for up to 20 doses, Disp: 20 tablet, Rfl: 0    metFORMIN (GLUCOPHAGE-XR) 750 mg 24 hr tablet, Take 1 tablet (750 mg total) by mouth daily, Disp: 90 tablet, Rfl: 0    Multiple Vitamins-Minerals (MULTIVITAMIN WITH MINERALS) tablet, Take 1  "tablet by mouth daily, Disp: , Rfl:     omega-3-acid ethyl esters (LOVAZA) 1 g capsule, Take 1 g by mouth 2 (two) times a day, Disp: , Rfl:     tirzepatide (Zepbound) 10 mg/0.5 mL auto-injector, Inject 0.5 mL (10 mg total) under the skin once a week, Disp: 2 mL, Rfl: 3    traZODone (DESYREL) 100 mg tablet, Take 1.5 tablets (150 mg total) by mouth daily at bedtime, Disp: 135 tablet, Rfl: 3    albuterol (ProAir HFA) 90 mcg/act inhaler, Inhale 2 puffs every 6 (six) hours as needed for wheezing (Patient not taking: Reported on 8/15/2024), Disp: 18 g, Rfl: 0    Allergies   Allergen Reactions    Other Itching and Rash     Dust, bugs        Review of Systems   Constitutional:  Negative for chills, fever and unexpected weight change.   HENT:  Negative for hearing loss, nosebleeds and sore throat.    Eyes:  Negative for pain, redness and visual disturbance.   Respiratory:  Negative for cough, shortness of breath and wheezing.    Cardiovascular:  Negative for chest pain, palpitations and leg swelling.   Gastrointestinal:  Negative for abdominal pain, nausea and vomiting.   Endocrine: Negative for polydipsia and polyuria.   Genitourinary:  Negative for dysuria and hematuria.   Musculoskeletal:         As noted in HPI   Skin:  Negative for rash and wound.   Neurological:  Negative for dizziness, numbness and headaches.   Psychiatric/Behavioral:  Negative for decreased concentration and suicidal ideas. The patient is not nervous/anxious.         Objective:  BP (!) 156/111   Pulse 94   Ht 5' 7\" (1.702 m)   LMP  (LMP Unknown)   BMI 31.11 kg/m²     Ortho Exam  Left knee -   Patient ambulates with antalgic gait pattern  Uses no assistive device  No anatomical deformity  Skin is warm and dry to touch with no signs of erythema, ecchymosis, infection  Mild soft tissue swelling, minimal effusion noted  ROM active 20-80, following CS injection 5-100  TTP over medial and lateral joint line, TTP in popliteal fossa  Flexor and extensor " "mechanisms intact  Knee is stable to varus and valgus stress  - Lachman's  - Anterior Drawer, - Posterior Drawer  Painful medial Eulalia's, painful lateral Eulalia's  Patella tracks centrally with significant palpable crepitus  Calf compartments are soft and supple  2+ TP and DP pulses with brisk capillary refill to the toes  Sural, saphenous, tibial, superficial and deep peroneal motor and sensory distributions intact  Sensation light touch intact distally    Physical Exam  Vitals and nursing note reviewed.   Constitutional:       General: She is not in acute distress.     Appearance: She is well-developed.   HENT:      Head: Normocephalic and atraumatic.   Eyes:      Conjunctiva/sclera: Conjunctivae normal.   Cardiovascular:      Rate and Rhythm: Normal rate.   Pulmonary:      Effort: Pulmonary effort is normal.   Musculoskeletal:      Cervical back: Neck supple.   Skin:     General: Skin is warm and dry.      Capillary Refill: Capillary refill takes less than 2 seconds.   Neurological:      Mental Status: She is alert and oriented to person, place, and time.   Psychiatric:         Mood and Affect: Mood normal.         Behavior: Behavior normal.          Diagnostic Test Review:  Attending Physician has personally reviewed pertinent imaging in PACS, impression is as follows:    Review of radiographic series taken 11/19/2024 of the left knee shows advanced tricompartmental osteoarthritis with significant joint space narrowing of the medial compartment, significant subchondral sclerosis, and osteophyte formation.    Large joint arthrocentesis: L knee  Universal Protocol:  Consent: Verbal consent obtained.  Risks and benefits: risks, benefits and alternatives were discussed  Consent given by: patient  Time out: Immediately prior to procedure a \"time out\" was called to verify the correct patient, procedure, equipment, support staff and site/side marked as required.  Timeout called at: 11/19/2024 12:41 PM.  Patient " understanding: patient states understanding of the procedure being performed  Site marked: the operative site was marked  Patient identity confirmed: verbally with patient  Supporting Documentation  Indications: pain and joint swelling   Procedure Details  Location: knee - L knee  Preparation: Patient was prepped and draped in the usual sterile fashion  Needle gauge: 21G.  Ultrasound guidance: no  Approach: anterolateral  Medications administered: 4 mL bupivacaine 0.25 %; 80 mg triamcinolone acetonide 40 mg/mL    Patient tolerance: patient tolerated the procedure well with no immediate complications  Dressing:  Sterile dressing applied           Scribe Attestation      I,:  Tyrese Ortiz am acting as a scribe while in the presence of the attending physician.:       I,:  Pancho Sales MD personally performed the services described in this documentation    as scribed in my presence.:

## 2025-02-04 ENCOUNTER — APPOINTMENT (OUTPATIENT)
Age: 65
End: 2025-02-04
Payer: COMMERCIAL

## 2025-02-04 ENCOUNTER — OFFICE VISIT (OUTPATIENT)
Age: 65
End: 2025-02-04
Payer: COMMERCIAL

## 2025-02-04 DIAGNOSIS — M17.12 PRIMARY OSTEOARTHRITIS OF LEFT KNEE: ICD-10-CM

## 2025-02-04 DIAGNOSIS — Z01.89 ENCOUNTER FOR LOWER EXTREMITY COMPARISON IMAGING STUDY: ICD-10-CM

## 2025-02-04 DIAGNOSIS — M17.12 PRIMARY OSTEOARTHRITIS OF LEFT KNEE: Primary | ICD-10-CM

## 2025-02-04 PROCEDURE — 20610 DRAIN/INJ JOINT/BURSA W/O US: CPT | Performed by: ORTHOPAEDIC SURGERY

## 2025-02-04 PROCEDURE — 73564 X-RAY EXAM KNEE 4 OR MORE: CPT

## 2025-02-04 PROCEDURE — 99204 OFFICE O/P NEW MOD 45 MIN: CPT | Performed by: ORTHOPAEDIC SURGERY

## 2025-02-04 PROCEDURE — 73560 X-RAY EXAM OF KNEE 1 OR 2: CPT

## 2025-02-04 RX ORDER — MELOXICAM 7.5 MG/1
7.5 TABLET ORAL DAILY
COMMUNITY
Start: 2025-01-10

## 2025-02-04 RX ORDER — TRIAMCINOLONE ACETONIDE 40 MG/ML
40 INJECTION, SUSPENSION INTRA-ARTICULAR; INTRAMUSCULAR
Status: COMPLETED | OUTPATIENT
Start: 2025-02-04 | End: 2025-02-04

## 2025-02-04 RX ORDER — LIDOCAINE HYDROCHLORIDE 10 MG/ML
4 INJECTION, SOLUTION EPIDURAL; INFILTRATION; INTRACAUDAL; PERINEURAL
Status: COMPLETED | OUTPATIENT
Start: 2025-02-04 | End: 2025-02-04

## 2025-02-04 RX ADMIN — TRIAMCINOLONE ACETONIDE 40 MG: 40 INJECTION, SUSPENSION INTRA-ARTICULAR; INTRAMUSCULAR at 08:00

## 2025-02-04 RX ADMIN — LIDOCAINE HYDROCHLORIDE 4 ML: 10 INJECTION, SOLUTION EPIDURAL; INFILTRATION; INTRACAUDAL; PERINEURAL at 08:00

## 2025-02-04 NOTE — PROGRESS NOTES
ASSESSMENT:  LEFT knee pain, severe medial compartment osteoarthritis     PLAN:  Discussed treatment options  Discussed limitations of arthroscopy with bone on bone apposition in her medial compartment on new xrays obtained today  Can continue with injections for now, she was interested in this today, LEFT knee steroid injection today   Medial  brace provided, to wear with activity  New referral for ortho made, patient would like to discuss possibility of joint replacement, possible UKA vs TKA  Patient requesting to be seen in this office, referral made to Dr. Weaver   Can follow up with me as needed for future injections or other concerns      REASON FOR VISIT:  Chief Complaint   Patient presents with    Left Knee - Pain       HISTORY OF PRESENT ILLNESS:  LEFT knee pain    Worsening pain in the LEFT knee  Pain primarily in the medial portion of the knee  Having nighttime symptoms   Denies numbness or tingling     Received many joint injections, most recently was on 11/19/24  Also completed gel injections, minimal relief   Has been following with Dr. Sales for the past several years       History reviewed. No pertinent past medical history.     Past Surgical History:   Procedure Laterality Date    ABLATION COLPOCLESIS      TUBAL LIGATION         Social History     Socioeconomic History    Marital status:      Spouse name: Not on file    Number of children: Not on file    Years of education: Not on file    Highest education level: Not on file   Occupational History    Not on file   Tobacco Use    Smoking status: Never    Smokeless tobacco: Never   Vaping Use    Vaping status: Never Used   Substance and Sexual Activity    Alcohol use: Yes     Alcohol/week: 1.0 standard drink of alcohol     Types: 1 Cans of beer per week     Comment: social    Drug use: Never    Sexual activity: Yes   Other Topics Concern    Not on file   Social History Narrative    Not on file     Social Drivers of Health     Financial  Resource Strain: Not on file   Food Insecurity: Not on file   Transportation Needs: Not on file   Physical Activity: Not on file   Stress: Not on file   Social Connections: Not on file   Intimate Partner Violence: Not on file   Housing Stability: Not on file       MEDICATIONS:    Current Outpatient Medications:     CALCIUM PO, Take by mouth, Disp: , Rfl:     Cholecalciferol (VITAMIN D-3 PO), Take by mouth, Disp: , Rfl:     ketoconazole (NIZORAL) 2 % cream, Apply topically daily, Disp: 60 g, Rfl: 5    LORazepam (ATIVAN) 1 mg tablet, Take 1 tablet (1 mg total) by mouth every 8 (eight) hours as needed for anxiety for up to 20 doses, Disp: 20 tablet, Rfl: 0    meloxicam (MOBIC) 7.5 mg tablet, 7.5 mg daily, Disp: , Rfl:     metFORMIN (GLUCOPHAGE-XR) 750 mg 24 hr tablet, Take 1 tablet (750 mg total) by mouth daily, Disp: 90 tablet, Rfl: 0    Multiple Vitamins-Minerals (MULTIVITAMIN WITH MINERALS) tablet, Take 1 tablet by mouth daily, Disp: , Rfl:     omega-3-acid ethyl esters (LOVAZA) 1 g capsule, Take 1 g by mouth 2 (two) times a day, Disp: , Rfl:     tirzepatide (Zepbound) 10 mg/0.5 mL auto-injector, Inject 0.5 mL (10 mg total) under the skin once a week, Disp: 2 mL, Rfl: 3    traZODone (DESYREL) 100 mg tablet, Take 1.5 tablets (150 mg total) by mouth daily at bedtime, Disp: 135 tablet, Rfl: 3    albuterol (ProAir HFA) 90 mcg/act inhaler, Inhale 2 puffs every 6 (six) hours as needed for wheezing (Patient not taking: Reported on 8/15/2024), Disp: 18 g, Rfl: 0    ALLERGIES:  Allergies   Allergen Reactions    Other Itching and Rash     Dust, bugs        MAJOR FINDINGS:  Ibis Schmid is pleasant, healthy appearing, and in no acute distress.     LEFT knee  Skin intact, ROM 0-0-120   Trace effusion  Normal patella tracking   No patella apprehension  Joint line tenderness: medial, Eulalia test: deferred due to advanced arthritis   Anterior drawer: negative, Lachman: stable, Posterior drawer: negative   Stable to  varus  Pseudolaxity to valgus strain   Sensation intact sp/dp/t  Strength intact 5/5 dorsiflexion/plantarflexion/SLR   Extremity wwp  No calf pain      RIGHT knee  Skin intact, ROM 5-0-130   No effusion  Normal patella tracking   No patella apprehension  Joint line tenderness: none, Eulalia test: negative  Anterior drawer: negative, Lachman: stable, Posterior drawer: negative   Stable to varus/valgus  Sensation intact sp/dp/t  Strength intact 5/5 dorsiflexion/plantarflexion/SLR   Extremity wwp  No calf pain    Large joint arthrocentesis: L knee  Universal Protocol:  Consent: Verbal consent obtained.  Risks and benefits: risks, benefits and alternatives were discussed  Consent given by: patient  Site marked: the operative site was marked  Supporting Documentation  Indications: pain   Procedure Details  Location: knee - L knee  Needle size: 22 G  Approach: anterolateral  Medications administered: 4 mL lidocaine (PF) 1 %; 40 mg triamcinolone acetonide 40 mg/mL    Patient tolerance: patient tolerated the procedure well with no immediate complications  Dressing:  Sterile dressing applied          IMAGING  I personally reviewed and interpreted the imaging studies  X-rays performed on the LEFT knee show advanced medial compartment narrowing with bone on bone apposition on the LEFT knee      CC:  Bharti Baez PA-C No ref. provider found

## 2025-02-11 ENCOUNTER — OFFICE VISIT (OUTPATIENT)
Age: 65
End: 2025-02-11
Payer: COMMERCIAL

## 2025-02-11 ENCOUNTER — NURSE TRIAGE (OUTPATIENT)
Age: 65
End: 2025-02-11

## 2025-02-11 VITALS
OXYGEN SATURATION: 99 % | TEMPERATURE: 97.3 F | DIASTOLIC BLOOD PRESSURE: 74 MMHG | HEART RATE: 79 BPM | HEIGHT: 67 IN | BODY MASS INDEX: 29.51 KG/M2 | WEIGHT: 188 LBS | SYSTOLIC BLOOD PRESSURE: 110 MMHG

## 2025-02-11 DIAGNOSIS — E66.09 CLASS 1 OBESITY DUE TO EXCESS CALORIES WITHOUT SERIOUS COMORBIDITY WITH BODY MASS INDEX (BMI) OF 30.0 TO 30.9 IN ADULT: ICD-10-CM

## 2025-02-11 DIAGNOSIS — J20.8 ACUTE BRONCHITIS, VIRAL: ICD-10-CM

## 2025-02-11 DIAGNOSIS — E66.811 CLASS 1 OBESITY DUE TO EXCESS CALORIES WITHOUT SERIOUS COMORBIDITY WITH BODY MASS INDEX (BMI) OF 30.0 TO 30.9 IN ADULT: ICD-10-CM

## 2025-02-11 LAB
SARS-COV-2 AG UPPER RESP QL IA: NEGATIVE
SL AMB POCT RAPID FLU A: NEGATIVE
SL AMB POCT RAPID FLU B: NEGATIVE
VALID CONTROL: NORMAL

## 2025-02-11 PROCEDURE — 87811 SARS-COV-2 COVID19 W/OPTIC: CPT | Performed by: NURSE PRACTITIONER

## 2025-02-11 PROCEDURE — 99213 OFFICE O/P EST LOW 20 MIN: CPT | Performed by: NURSE PRACTITIONER

## 2025-02-11 PROCEDURE — 87804 INFLUENZA ASSAY W/OPTIC: CPT | Performed by: NURSE PRACTITIONER

## 2025-02-11 RX ORDER — ALBUTEROL SULFATE 90 UG/1
2 INHALANT RESPIRATORY (INHALATION) EVERY 6 HOURS PRN
Qty: 18 G | Refills: 0 | Status: SHIPPED | OUTPATIENT
Start: 2025-02-11

## 2025-02-11 RX ORDER — TIRZEPATIDE 10 MG/.5ML
10 INJECTION, SOLUTION SUBCUTANEOUS WEEKLY
Qty: 2 ML | Refills: 3 | Status: SHIPPED | OUTPATIENT
Start: 2025-02-11 | End: 2025-06-03

## 2025-02-11 RX ORDER — AZITHROMYCIN 250 MG/1
TABLET, FILM COATED ORAL
Qty: 6 TABLET | Refills: 0 | Status: SHIPPED | OUTPATIENT
Start: 2025-02-11 | End: 2025-02-16

## 2025-02-11 RX ORDER — FLUTICASONE PROPIONATE 50 MCG
2 SPRAY, SUSPENSION (ML) NASAL DAILY
Qty: 18 ML | Refills: 1 | Status: SHIPPED | OUTPATIENT
Start: 2025-02-11

## 2025-02-11 RX ORDER — MELOXICAM 7.5 MG/1
7.5 TABLET ORAL DAILY
Qty: 30 TABLET | Status: CANCELLED | OUTPATIENT
Start: 2025-02-11

## 2025-02-11 NOTE — TELEPHONE ENCOUNTER
Pt calling in to request zepbound 12.5mg rx. States she has been doing well on the 10mg, no concerns, did note she will sometimes have mild nausea that resolves on its own. Due to  next dose this week, due for shot on Friday and requests call when rx ordered. Routing for review.

## 2025-02-11 NOTE — PROGRESS NOTES
Name: Ibis Schmid      : 1960      MRN: [id number]  Encounter Provider: BENJI Esteban  Encounter Date: 2025   Encounter department: [facility]  :  Assessment & Plan  Acute bronchitis, viral  Illness appears to be viral in nature.   Will start z-pac  Rest and fluids advised.   Educated that the course of this illness could be 2-4 weeks.   Discussed symptomatic relief, such as warm steam inhalations, tylenol/ibuprofen for fevers and body aches, rest, and drink plenty of fluids.  Warm salt gargles for sore throat.   Discussed red flag signs to go to the ER, such as chest pain or shortness of breath.   Return to the office for reevaluation if symptoms worsen or do not improve in 1-2 weeks     Orders:    albuterol (ProAir HFA) 90 mcg/act inhaler; Inhale 2 puffs every 6 (six) hours as needed for wheezing    Poct Covid 19 Rapid Antigen Test    POCT rapid flu A and B    azithromycin (Zithromax) 250 mg tablet; Take 2 tablets (500 mg total) by mouth daily for 1 day, THEN 1 tablet (250 mg total) daily for 4 days.    fluticasone (FLONASE) 50 mcg/act nasal spray; 2 sprays into each nostril daily           History of Present Illness   Patient presents today with cold like symptoms.     Dry cough, with burning  in the chest     Rapid COVID and FLU negative           Cough  This is a new problem. Episode onset: saturday. Associated symptoms include nasal congestion, postnasal drip, a sore throat and shortness of breath. Pertinent negatives include no chest pain, chills, ear pain, fever, headaches, rash, rhinorrhea or wheezing. Treatments tried: Dayquil,nyquil. The treatment provided no relief.     Review of Systems   Constitutional:  Negative for activity change, appetite change, chills, diaphoresis and fever.   HENT:  Positive for postnasal drip and sore throat. Negative for congestion, ear discharge, ear pain, rhinorrhea, sinus pressure and sinus pain.    Eyes:  " Negative for pain, discharge, itching and visual disturbance.   Respiratory:  Positive for cough and shortness of breath. Negative for chest tightness and wheezing.    Cardiovascular:  Negative for chest pain, palpitations and leg swelling.   Gastrointestinal:  Negative for abdominal pain, constipation, diarrhea, nausea and vomiting.   Endocrine: Negative for polydipsia, polyphagia and polyuria.   Genitourinary:  Negative for difficulty urinating, dysuria and urgency.   Musculoskeletal:  Negative for arthralgias, back pain and neck pain.   Skin:  Negative for rash and wound.   Neurological:  Negative for dizziness, weakness, numbness and headaches.       Objective   /74 (BP Location: Left arm, Patient Position: Sitting, Cuff Size: Standard)   Pulse 79   Temp (!) 97.3 °F (36.3 °C) (Temporal)   Ht 5' 7\" (1.702 m)   Wt 85.3 kg (188 lb)   LMP  (LMP Unknown)   SpO2 99%   BMI 29.44 kg/m²      Physical Exam  Constitutional:       General: She is not in acute distress.     Appearance: She is well-developed. She is not diaphoretic.   HENT:      Head: Normocephalic and atraumatic.      Right Ear: External ear normal.      Left Ear: External ear normal.      Nose: Nose normal.      Mouth/Throat:      Mouth: Mucous membranes are moist.      Pharynx: No oropharyngeal exudate or posterior oropharyngeal erythema.   Eyes:      General:         Right eye: No discharge.         Left eye: No discharge.      Conjunctiva/sclera: Conjunctivae normal.      Pupils: Pupils are equal, round, and reactive to light.   Neck:      Thyroid: No thyromegaly.   Cardiovascular:      Rate and Rhythm: Normal rate and regular rhythm.      Heart sounds: Normal heart sounds. No murmur heard.     No friction rub. No gallop.   Pulmonary:      Effort: Pulmonary effort is normal. No respiratory distress.      Breath sounds: Normal breath sounds. No stridor. No wheezing or rales.   Abdominal:      General: Bowel sounds are normal. There is no " distension.      Palpations: Abdomen is soft.      Tenderness: There is no abdominal tenderness.   Musculoskeletal:      Cervical back: Normal range of motion and neck supple.   Lymphadenopathy:      Cervical: No cervical adenopathy.   Skin:     General: Skin is warm and dry.      Findings: No erythema or rash.   Neurological:      Mental Status: She is alert and oriented to person, place, and time.   Psychiatric:         Behavior: Behavior normal.         Thought Content: Thought content normal.         Judgment: Judgment normal.          Normal rate, regular rhythm.  Heart sounds S1, S2.  No murmurs, rubs or gallops.

## 2025-02-11 NOTE — TELEPHONE ENCOUNTER
Lm- per provider Recommend maintaining dose until follow up. [facility]      1.89

## 2025-02-11 NOTE — ASSESSMENT & PLAN NOTE
Illness appears to be viral in nature.   Will start z-pac  Rest and fluids advised.   Educated that the course of this illness could be 2-4 weeks.   Discussed symptomatic relief, such as warm steam inhalations, tylenol/ibuprofen for fevers and body aches, rest, and drink plenty of fluids.  Warm salt gargles for sore throat.   Discussed red flag signs to go to the ER, such as chest pain or shortness of breath.   Return to the office for reevaluation if symptoms worsen or do not improve in 1-2 weeks     Orders:    albuterol (ProAir HFA) 90 mcg/act inhaler; Inhale 2 puffs every 6 (six) hours as needed for wheezing    Poct Covid 19 Rapid Antigen Test    POCT rapid flu A and B    azithromycin (Zithromax) 250 mg tablet; Take 2 tablets (500 mg total) by mouth daily for 1 day, THEN 1 tablet (250 mg total) daily for 4 days.    fluticasone (FLONASE) 50 mcg/act nasal spray; 2 sprays into each nostril daily

## 2025-02-17 ENCOUNTER — TELEPHONE (OUTPATIENT)
Age: 65
End: 2025-02-17

## 2025-02-17 DIAGNOSIS — F51.01 PRIMARY INSOMNIA: ICD-10-CM

## 2025-02-17 RX ORDER — TRAZODONE HYDROCHLORIDE 100 MG/1
200 TABLET ORAL
Qty: 60 TABLET | Refills: 5 | Status: SHIPPED | OUTPATIENT
Start: 2025-02-17

## 2025-02-17 NOTE — TELEPHONE ENCOUNTER
Bharti is not in the office till tomorrow.  Patient would need to wait till she comes in and see the message and see what she would like to do with this medication.  There is no documentation in her chart to 200.  Will need to get the approval when Bharti is in the [place] office tomorrow

## 2025-02-17 NOTE — TELEPHONE ENCOUNTER
Called patient and left a message that Bharti Baez sent a new prescription for Trazodone to [facility] today.

## 2025-02-17 NOTE — TELEPHONE ENCOUNTER
Pt called stating the traZODone 100 mg does not work. She has been taking 200 mgs and is able to sleep. She is out of meds now and wants to know if Bharti can give her a new script for 200 mgs.    Please advise.    [facility] [place] [facility] [place] - [address] [phone]

## 2025-03-13 PROBLEM — J20.8 ACUTE BRONCHITIS, VIRAL: Status: RESOLVED | Noted: 2025-02-11 | Resolved: 2025-03-13

## 2025-03-21 ENCOUNTER — TELEPHONE (OUTPATIENT)
Dept: BARIATRICS | Facility: CLINIC | Age: 65
End: 2025-03-21

## 2025-03-21 NOTE — TELEPHONE ENCOUNTER
Medication: Metformin    Dose/Frequency: 750 mg daily    Quantity: 90    Pharmacy: [facility] [place] - [place] - [address] [place] [address]  Phone: [phone]  Fax: [phone]     Office:   [] PCP/Provider -   [x] Speciality/Provider - Adrian Isabel PA-C    Does the patient have enough for 3 days?   [] Yes   [x] No - Send as HP to POD-Patient is out of her medication

## 2025-03-24 DIAGNOSIS — R73.03 PREDIABETES: ICD-10-CM

## 2025-03-24 RX ORDER — METFORMIN HYDROCHLORIDE 750 MG/1
750 TABLET, EXTENDED RELEASE ORAL DAILY
Qty: 90 TABLET | Refills: 1 | Status: SHIPPED | OUTPATIENT
Start: 2025-03-24

## 2025-04-11 ENCOUNTER — OFFICE VISIT (OUTPATIENT)
Age: 65
End: 2025-04-11
Payer: COMMERCIAL

## 2025-04-11 VITALS
TEMPERATURE: 96.6 F | BODY MASS INDEX: 29.98 KG/M2 | HEART RATE: 76 BPM | SYSTOLIC BLOOD PRESSURE: 124 MMHG | WEIGHT: 191 LBS | HEIGHT: 67 IN | DIASTOLIC BLOOD PRESSURE: 88 MMHG

## 2025-04-11 DIAGNOSIS — R73.03 PRE-DIABETES: ICD-10-CM

## 2025-04-11 DIAGNOSIS — E66.811 OBESITY, CLASS I, BMI 30-34.9: Primary | ICD-10-CM

## 2025-04-11 PROCEDURE — 99213 OFFICE O/P EST LOW 20 MIN: CPT | Performed by: PHYSICIAN ASSISTANT

## 2025-04-11 RX ORDER — TIRZEPATIDE 12.5 MG/.5ML
12.5 INJECTION, SOLUTION SUBCUTANEOUS WEEKLY
Qty: 2 ML | Refills: 1 | Status: SHIPPED | OUTPATIENT
Start: 2025-04-11 | End: 2025-06-06

## 2025-04-11 NOTE — PROGRESS NOTES
Assessment/Plan:    1. Obesity, Class I, BMI 30-34.9  tirzepatide (Zepbound) 12.5 mg/0.5 mL auto-injector      2. Pre-diabetes  Hemoglobin A1C            Initial: 217.2 lbs  Last visit: 198 lbs (11/8/24)  Current:  191 lbs BMI 29.91 (4/11/25)   Change: -26 lbs (-11.98%)   Goal: 160 lbs    - Weight not at goal  - Patient is interested in Conservative Program  - Labs reviewed: As below.    General Recommendations:  Nutrition:  Eat breakfast daily.  Do not skip meals.     Food log (ie.) www.[employer]com, sparkpeople.com, loseit.com, calorieking.com, etc.    Practice mindful eating.  Be sure to set aside time to eat, eat slowly, and savor your food.    Hydration:    At least 64oz of water daily.  No sugar sweetened beverages.  No juice (eat the fruit instead).    Exercise:  Studies have shown that the ideal exercise goal is somewhere between 150 to 300 minutes of moderate intensity exercise a week.  Start with exercising 10 minutes every other day and gradually increase physical activity with a goal of at least 150 minutes of moderate intensity exercise a week, divided over at least 3 days a week.  An example of this would be exercising 30 minutes a day, 5 days a week.  Resistance training can increase muscle mass and increase our resting metabolic rate.   FULL BODY resistance training is recommended 2-3 times a week.  Do not do this on consecutive days to allow for muscle recovery.    Aim for a bare minimum 5000 steps, even on days you do not exercise.    Monitoring:   Weigh yourself daily.  If this causes undue stress, then just weigh yourself once a week.  Weigh yourself the same time of the day with the same amount of clothing on.  Preferably this should be done after waking up, before you eat, and with no clothing or minimal clothing on.    Calorie goal:  1649-7887 veena/day    Return visit:    Calorie tracking/deficit  Physical activity goals  AOM  Continue zepbound - increase to 12.5mg then 15mg  stop  metformin  RTC:  4 months        Subjective:   Chief Complaint   Patient presents with    Follow-up     Mwm 4mth f/u        Patient ID: Ibis Schmid  is a 64 y.o. female with excess weight/obesity here to pursue weight managment.  Patient is pursuing Conservative Program.     HPI    Previously took saxenda  Plateaued on wegovy 2.4  Recently transitioned to zepbound - currently on 10 mg. No adverse side effects.    No nausea or vomiting  BM - daily  No abdominal pain      Food recall 11/8/24  B: hard boiled eggs + turkey dela cruz  S: jello or cheese/cracker  L salad + chicken OR soup  S: skip or jello or cheese  D:  salad or meatloaf  S: skip     Beverages - water + seltzer     Breast augmentation 6/5     Wt Readings from Last 10 Encounters:   04/11/25 86.6 kg (191 lb)   02/11/25 85.3 kg (188 lb)   11/08/24 90.1 kg (198 lb 9.6 oz)   08/15/24 89.8 kg (198 lb)   05/28/24 88.5 kg (195 lb)   05/21/24 88.5 kg (195 lb)   05/14/24 88.5 kg (195 lb)   05/10/24 88.5 kg (195 lb)   02/09/24 86.4 kg (190 lb 6.4 oz)   01/30/24 88.2 kg (194 lb 6.4 oz)       Food logging:not currently  Increased appetite/cravings:controlled  Exercise:walking regularly, has peloton but not using currently  Hydration:at least 64 oz water, has cut back on alcohol intake        The following portions of the patient's history were reviewed and updated as appropriate: She  has no past medical history on file.  She   Patient Active Problem List    Diagnosis Date Noted    Primary osteoarthritis of left knee 04/02/2024    Class 1 obesity due to excess calories without serious comorbidity with body mass index (BMI) of 30.0 to 30.9 in adult 12/30/2022    Prediabetes 12/30/2022    Bursitis of left knee 07/23/2021    Acute pain of left knee 07/23/2021    COVID-19 12/01/2020    Benign essential hypertension 12/04/2014    Anxiety state 02/20/2014    Leiomyoma of uterus 11/21/2013     She  has a past surgical history that includes Tubal ligation and Ablation  colpoclesis.  Her family history includes Breast cancer in her maternal grandmother and paternal grandmother; Diabetes in her father; Glaucoma in her father; No Known Problems in her daughter, maternal grandfather, mother, paternal aunt, paternal grandfather, sister, sister, and son.  She  reports that she has never smoked. She has never used smokeless tobacco. She reports current alcohol use of about 1.0 standard drink of alcohol per week. She reports that she does not use drugs.  Current Outpatient Medications   Medication Sig Dispense Refill    albuterol (ProAir HFA) 90 mcg/act inhaler Inhale 2 puffs every 6 (six) hours as needed for wheezing 18 g 0    CALCIUM PO Take by mouth      Cholecalciferol (VITAMIN D-3 PO) Take by mouth      fluticasone (FLONASE) 50 mcg/act nasal spray 2 sprays into each nostril daily 18 mL 1    ketoconazole (NIZORAL) 2 % cream Apply topically daily 60 g 5    LORazepam (ATIVAN) 1 mg tablet Take 1 tablet (1 mg total) by mouth every 8 (eight) hours as needed for anxiety for up to 20 doses 20 tablet 0    meloxicam (MOBIC) 7.5 mg tablet 7.5 mg daily      Multiple Vitamins-Minerals (MULTIVITAMIN WITH MINERALS) tablet Take 1 tablet by mouth daily      omega-3-acid ethyl esters (LOVAZA) 1 g capsule Take 1 g by mouth 2 (two) times a day      tirzepatide (Zepbound) 12.5 mg/0.5 mL auto-injector Inject 0.5 mL (12.5 mg total) under the skin once a week 2 mL 1    traZODone (DESYREL) 100 mg tablet Take 2 tablets (200 mg total) by mouth daily at bedtime 60 tablet 5     No current facility-administered medications for this visit.     Current Outpatient Medications on File Prior to Visit   Medication Sig    albuterol (ProAir HFA) 90 mcg/act inhaler Inhale 2 puffs every 6 (six) hours as needed for wheezing    CALCIUM PO Take by mouth    Cholecalciferol (VITAMIN D-3 PO) Take by mouth    fluticasone (FLONASE) 50 mcg/act nasal spray 2 sprays into each nostril daily    ketoconazole (NIZORAL) 2 % cream Apply  "topically daily    LORazepam (ATIVAN) 1 mg tablet Take 1 tablet (1 mg total) by mouth every 8 (eight) hours as needed for anxiety for up to 20 doses    meloxicam (MOBIC) 7.5 mg tablet 7.5 mg daily    Multiple Vitamins-Minerals (MULTIVITAMIN WITH MINERALS) tablet Take 1 tablet by mouth daily    omega-3-acid ethyl esters (LOVAZA) 1 g capsule Take 1 g by mouth 2 (two) times a day    traZODone (DESYREL) 100 mg tablet Take 2 tablets (200 mg total) by mouth daily at bedtime    [DISCONTINUED] metFORMIN (GLUCOPHAGE-XR) 750 mg 24 hr tablet Take 1 tablet (750 mg total) by mouth in the morning    [DISCONTINUED] tirzepatide (Zepbound) 10 mg/0.5 mL auto-injector Inject 0.5 mL (10 mg total) under the skin once a week     No current facility-administered medications on file prior to visit.     She is allergic to other..    Review of Systems   Constitutional:  Negative for fever.   Respiratory:  Negative for shortness of breath.    Cardiovascular:  Negative for chest pain and palpitations.   Gastrointestinal:  Negative for abdominal pain, constipation, diarrhea and vomiting.   Genitourinary:  Negative for difficulty urinating.   Musculoskeletal:  Positive for arthralgias.   Skin:  Negative for rash.   Neurological:  Negative for headaches.   Psychiatric/Behavioral:  Negative for dysphoric mood. The patient is not nervous/anxious.        Objective:    /88 (Patient Position: Sitting, Cuff Size: Standard)   Pulse 76   Temp (!) 96.6 °F (35.9 °C) (Tympanic)   Ht 5' 7\" (1.702 m)   Wt 86.6 kg (191 lb)   LMP  (LMP Unknown)   BMI 29.91 kg/m²      Physical Exam  Vitals and nursing note reviewed.   Constitutional:       General: She is not in acute distress.     Appearance: She is well-developed. She is obese.   HENT:      Head: Normocephalic and atraumatic.   Eyes:      Conjunctiva/sclera: Conjunctivae normal.   Neck:      Thyroid: No thyromegaly.   Pulmonary:      Effort: Pulmonary effort is normal. No respiratory distress. "   Skin:     Findings: No rash (visible).   Neurological:      Mental Status: She is alert and oriented to person, place, and time.   Psychiatric:         Mood and Affect: Mood normal.         Behavior: Behavior normal.     francisco

## 2025-05-01 ENCOUNTER — APPOINTMENT (OUTPATIENT)
Dept: LAB | Facility: CLINIC | Age: 65
End: 2025-05-01
Attending: PHYSICIAN ASSISTANT

## 2025-05-01 ENCOUNTER — OFFICE VISIT (OUTPATIENT)
Dept: LAB | Facility: CLINIC | Age: 65
End: 2025-05-01
Attending: SURGERY
Payer: COMMERCIAL

## 2025-05-01 DIAGNOSIS — Z01.818 OTHER SPECIFIED PRE-OPERATIVE EXAMINATION: ICD-10-CM

## 2025-05-01 DIAGNOSIS — Z01.812 PRE-OPERATIVE LABORATORY EXAMINATION: ICD-10-CM

## 2025-05-01 DIAGNOSIS — F51.01 PRIMARY INSOMNIA: ICD-10-CM

## 2025-05-01 DIAGNOSIS — D50.8 IRON DEFICIENCY ANEMIA SECONDARY TO INADEQUATE DIETARY IRON INTAKE: ICD-10-CM

## 2025-05-01 DIAGNOSIS — R73.03 PRE-DIABETES: ICD-10-CM

## 2025-05-01 DIAGNOSIS — R53.83 FATIGUE, UNSPECIFIED TYPE: ICD-10-CM

## 2025-05-01 DIAGNOSIS — N64.82 CONGENITAL HYPOPLASIA OF BREAST: ICD-10-CM

## 2025-05-01 LAB
ANION GAP SERPL CALCULATED.3IONS-SCNC: 10 MMOL/L (ref 4–13)
ATRIAL RATE: 71 BPM
BUN SERPL-MCNC: 20 MG/DL (ref 5–25)
CALCIUM SERPL-MCNC: 9.4 MG/DL (ref 8.4–10.2)
CHLORIDE SERPL-SCNC: 106 MMOL/L (ref 96–108)
CO2 SERPL-SCNC: 24 MMOL/L (ref 21–32)
CREAT SERPL-MCNC: 1.17 MG/DL (ref 0.6–1.3)
ERYTHROCYTE [DISTWIDTH] IN BLOOD BY AUTOMATED COUNT: 13.4 % (ref 11.6–15.1)
EST. AVERAGE GLUCOSE BLD GHB EST-MCNC: 114 MG/DL
GFR SERPL CREATININE-BSD FRML MDRD: 50 ML/MIN/1.73SQ M
GLUCOSE P FAST SERPL-MCNC: 75 MG/DL (ref 65–99)
HBA1C MFR BLD: 5.6 %
HCT VFR BLD AUTO: 41 % (ref 34.8–46.1)
HGB BLD-MCNC: 12.8 G/DL (ref 11.5–15.4)
MCH RBC QN AUTO: 28.1 PG (ref 26.8–34.3)
MCHC RBC AUTO-ENTMCNC: 31.2 G/DL (ref 31.4–37.4)
MCV RBC AUTO: 90 FL (ref 82–98)
P AXIS: 43 DEGREES
PLATELET # BLD AUTO: 319 THOUSANDS/UL (ref 149–390)
PMV BLD AUTO: 9.6 FL (ref 8.9–12.7)
POTASSIUM SERPL-SCNC: 3.6 MMOL/L (ref 3.5–5.3)
PR INTERVAL: 142 MS
QRS AXIS: -7 DEGREES
QRSD INTERVAL: 94 MS
QT INTERVAL: 374 MS
QTC INTERVAL: 406 MS
RBC # BLD AUTO: 4.55 MILLION/UL (ref 3.81–5.12)
SODIUM SERPL-SCNC: 140 MMOL/L (ref 135–147)
T WAVE AXIS: 26 DEGREES
VENTRICULAR RATE: 71 BPM
WBC # BLD AUTO: 5.68 THOUSAND/UL (ref 4.31–10.16)

## 2025-05-01 PROCEDURE — 83036 HEMOGLOBIN GLYCOSYLATED A1C: CPT

## 2025-05-01 PROCEDURE — 36415 COLL VENOUS BLD VENIPUNCTURE: CPT

## 2025-05-01 PROCEDURE — 85027 COMPLETE CBC AUTOMATED: CPT

## 2025-05-01 PROCEDURE — 93005 ELECTROCARDIOGRAM TRACING: CPT

## 2025-05-01 PROCEDURE — 80048 BASIC METABOLIC PNL TOTAL CA: CPT

## 2025-05-01 PROCEDURE — 93010 ELECTROCARDIOGRAM REPORT: CPT | Performed by: INTERNAL MEDICINE

## 2025-06-09 ENCOUNTER — NURSE TRIAGE (OUTPATIENT)
Dept: SURGERY | Facility: CLINIC | Age: 65
End: 2025-06-09

## 2025-06-09 ENCOUNTER — TELEPHONE (OUTPATIENT)
Dept: SURGERY | Facility: CLINIC | Age: 65
End: 2025-06-09

## 2025-06-09 DIAGNOSIS — E66.811 OBESITY, CLASS I, BMI 30-34.9: Primary | ICD-10-CM

## 2025-06-09 RX ORDER — TIRZEPATIDE 15 MG/.5ML
15 INJECTION, SOLUTION SUBCUTANEOUS WEEKLY
Qty: 2 ML | Refills: 2 | Status: SHIPPED | OUTPATIENT
Start: 2025-06-09 | End: 2025-09-01

## 2025-06-09 NOTE — TELEPHONE ENCOUNTER
Medication: Zepbound-Patient is currently on 12.5 mg. Doing well. No symptoms. Requesting to go up to next dose.    Dose/Frequency: 15 mg weekly subcutaneous    Quantity: 2 ml    Pharmacy: [facility] [place] - [place] - [address] [facility] [address]  [address] [facility] [address], [place] [address]  Phone: [phone]  Fax: [phone]     Office:   [] PCP/Provider -   [x] Speciality/Provider - Genevieve Levine PA-C     Does the patient have enough for 3 days?   [] Yes   [x] No - Send as HP to POD-Has one injection left

## 2025-06-10 NOTE — TELEPHONE ENCOUNTER
Patient called to follow up on Zepbound order.    Advised that Zepbound 15mg was sent to her [facility] pharmacy with 2 refills.  She can contact pharmacy to have medication filled.  No further questions.

## 2025-06-19 ENCOUNTER — OFFICE VISIT (OUTPATIENT)
Dept: INTERNAL MEDICINE CLINIC | Age: 65
End: 2025-06-19
Payer: COMMERCIAL

## 2025-06-19 VITALS
DIASTOLIC BLOOD PRESSURE: 66 MMHG | TEMPERATURE: 97.6 F | BODY MASS INDEX: 30.92 KG/M2 | WEIGHT: 197 LBS | SYSTOLIC BLOOD PRESSURE: 116 MMHG | OXYGEN SATURATION: 97 % | HEIGHT: 67 IN | HEART RATE: 86 BPM

## 2025-06-19 DIAGNOSIS — Z12.11 SCREENING FOR MALIGNANT NEOPLASM OF COLON: ICD-10-CM

## 2025-06-19 DIAGNOSIS — E78.5 HYPERLIPIDEMIA, UNSPECIFIED HYPERLIPIDEMIA TYPE: ICD-10-CM

## 2025-06-19 DIAGNOSIS — Z12.31 ENCOUNTER FOR SCREENING MAMMOGRAM FOR MALIGNANT NEOPLASM OF BREAST: ICD-10-CM

## 2025-06-19 DIAGNOSIS — F41.1 GAD (GENERALIZED ANXIETY DISORDER): ICD-10-CM

## 2025-06-19 DIAGNOSIS — Z00.00 ANNUAL PHYSICAL EXAM: ICD-10-CM

## 2025-06-19 DIAGNOSIS — F32.0 CURRENT MILD EPISODE OF MAJOR DEPRESSIVE DISORDER WITHOUT PRIOR EPISODE (HCC): Primary | ICD-10-CM

## 2025-06-19 PROCEDURE — 99396 PREV VISIT EST AGE 40-64: CPT | Performed by: PHYSICIAN ASSISTANT

## 2025-06-19 PROCEDURE — 99214 OFFICE O/P EST MOD 30 MIN: CPT | Performed by: PHYSICIAN ASSISTANT

## 2025-06-19 RX ORDER — ALPRAZOLAM 0.5 MG
0.5 TABLET ORAL
Qty: 30 TABLET | Refills: 0 | Status: SHIPPED | OUTPATIENT
Start: 2025-06-19

## 2025-06-19 RX ORDER — BUPROPION HYDROCHLORIDE 150 MG/1
150 TABLET ORAL EVERY MORNING
Qty: 30 TABLET | Refills: 3 | Status: SHIPPED | OUTPATIENT
Start: 2025-06-19 | End: 2025-12-16

## 2025-06-19 NOTE — PROGRESS NOTES
"Name: Ibis Schmid      : 1960      MRN: [id number]  Encounter Provider: Bharti Baez PA-C  Encounter Date: 2025   Encounter department: [facility] [place]  :  Assessment & Plan  Encounter for screening mammogram for malignant neoplasm of breast         Screening for malignant neoplasm of colon    Orders:    Cologuard    Current mild episode of major depressive disorder without prior episode (HCC)      Orders:    buPROPion (WELLBUTRIN XL) 150 mg 24 hr tablet; Take 1 tablet (150 mg total) by mouth every morning    GERARD (generalized anxiety disorder)    Orders:    ALPRAZolam (XANAX) 0.5 mg tablet; Take 1 tablet (0.5 mg total) by mouth daily at bedtime as needed for anxiety           History of Present Illness   HPI  Review of Systems   Respiratory:  Negative for cough, shortness of breath and wheezing.    Cardiovascular:  Negative for chest pain and leg swelling.   Gastrointestinal:  Negative for abdominal pain, constipation, diarrhea and nausea.   Musculoskeletal:  Negative for arthralgias and back pain.   Neurological:  Negative for dizziness, light-headedness and headaches.   Psychiatric/Behavioral:  Positive for sleep disturbance. The patient is nervous/anxious.        Objective   /66 (BP Location: Left arm, Patient Position: Sitting, Cuff Size: Standard)   Pulse 86   Temp 97.6 °F (36.4 °C) (Temporal)   Ht 5' 7\" (1.702 m)   Wt 89.4 kg (197 lb)   LMP  (LMP Unknown)   SpO2 97%   BMI 30.85 kg/m²      Physical Exam    "

## 2025-06-19 NOTE — PROGRESS NOTES
Adult Annual Physical  Name: Ibis Schmid      : 1960      MRN: [id number]  Encounter Provider: Bharti Baez PA-C  Encounter Date: 2025   Encounter department: [facility]    :  Assessment & Plan  Encounter for screening mammogram for malignant neoplasm of breast         Screening for malignant neoplasm of colon    Orders:    Cologuard    Current mild episode of major depressive disorder without prior episode (HCC)  +wellbutrin daily  F/u by myTips message       Orders:    buPROPion (WELLBUTRIN XL) 150 mg 24 hr tablet; Take 1 tablet (150 mg total) by mouth every morning    GERARD (generalized anxiety disorder)  Pdmp queried, may use xanax prn only  May try 1-2 daily prn   Orders:    ALPRAZolam (XANAX) 0.5 mg tablet; Take 1 tablet (0.5 mg total) by mouth daily at bedtime as needed for anxiety    Hyperlipidemia, unspecified hyperlipidemia type    Orders:    Lipid panel; Future    Annual physical exam             Preventive Screenings:  - Diabetes Screening: screening up-to-date  - Cholesterol Screening: screening not indicated and has hyperlipidemia   - Hepatitis C screening: screening up-to-date   - HIV screening: patient declines   - Cervical cancer screening: risks/benefits discussed   - Breast cancer screening: screening up-to-date   - Colon cancer screening: orders placed   - Lung cancer screening: screening not indicated     Immunizations:  - Immunizations due: Prevnar 20, Tdap and Zoster (Shingrix)         History of Present Illness     Adult Annual Physical:  Patient presents for annual physical. 65 y/o female with hx of insomnia, GERARD, obesity presents for f/u   Pt under stress, mother recently passed and she is having difficulty sleeping more so lately, agitated  Also, preparing for wedding in 9 months   More depressed, stressed     Pt underwent b/l breast augmentation 2 weeks ago   Dr Wright ([facility])   Pt reports she does not need pain medication at all  Takes  "tylenol .     Diet and Physical Activity:  - Diet/Nutrition: well balanced diet.  - Exercise: walking.    General Health:  - Sleep: sleeps poorly and 4-6 hours of sleep on average.  - Hearing: normal hearing bilateral ears.  - Vision: no vision problems.  - Dental: regular dental visits.    /GYN Health:  - Follows with GYN: yes.   - Menopause: postmenopausal.   - History of STDs: no    Review of Systems   Constitutional:  Negative for appetite change, chills, diaphoresis and fever.   HENT:  Negative for congestion and sore throat.    Eyes:  Negative for pain and redness.   Respiratory:  Negative for cough, shortness of breath and wheezing.    Cardiovascular:  Negative for chest pain and leg swelling.   Gastrointestinal:  Negative for abdominal pain, constipation, diarrhea and nausea.   Genitourinary:  Negative for dysuria and frequency.   Musculoskeletal:  Negative for arthralgias and back pain.   Skin:  Negative for rash.   Neurological:  Negative for dizziness, light-headedness and headaches.   Psychiatric/Behavioral:  Positive for sleep disturbance. The patient is nervous/anxious.          Objective   /66 (BP Location: Left arm, Patient Position: Sitting, Cuff Size: Standard)   Pulse 86   Temp 97.6 °F (36.4 °C) (Temporal)   Ht 5' 7\" (1.702 m)   Wt 89.4 kg (197 lb)   LMP  (LMP Unknown)   SpO2 97%   BMI 30.85 kg/m²     Physical Exam  Vitals reviewed.   Constitutional:       General: She is not in acute distress.  HENT:      Head: Normocephalic and atraumatic.      Nose: Nose normal.     Eyes:      General:         Right eye: No discharge.         Left eye: No discharge.      Extraocular Movements: Extraocular movements intact.      Conjunctiva/sclera: Conjunctivae normal.      Pupils: Pupils are equal, round, and reactive to light.       Cardiovascular:      Rate and Rhythm: Normal rate and regular rhythm.   Pulmonary:      Effort: Pulmonary effort is normal. No respiratory distress.      Breath " sounds: Normal breath sounds. No wheezing or rales.     Musculoskeletal:         General: Normal range of motion.      Cervical back: Normal range of motion. No rigidity.      Right lower leg: No edema.      Left lower leg: No edema.     Neurological:      General: No focal deficit present.      Mental Status: She is alert.     Psychiatric:         Mood and Affect: Mood normal.

## 2025-06-27 ENCOUNTER — OFFICE VISIT (OUTPATIENT)
Age: 65
End: 2025-06-27
Payer: COMMERCIAL

## 2025-06-27 DIAGNOSIS — M17.12 PRIMARY OSTEOARTHRITIS OF LEFT KNEE: Primary | ICD-10-CM

## 2025-06-27 DIAGNOSIS — M25.562 CHRONIC PAIN OF LEFT KNEE: ICD-10-CM

## 2025-06-27 DIAGNOSIS — G89.29 CHRONIC PAIN OF LEFT KNEE: ICD-10-CM

## 2025-06-27 PROCEDURE — 20610 DRAIN/INJ JOINT/BURSA W/O US: CPT

## 2025-06-27 PROCEDURE — 99213 OFFICE O/P EST LOW 20 MIN: CPT | Performed by: ORTHOPAEDIC SURGERY

## 2025-06-27 RX ORDER — TRIAMCINOLONE ACETONIDE 40 MG/ML
40 INJECTION, SUSPENSION INTRA-ARTICULAR; INTRAMUSCULAR
Status: COMPLETED | OUTPATIENT
Start: 2025-06-27 | End: 2025-06-27

## 2025-06-27 RX ORDER — LIDOCAINE HYDROCHLORIDE 10 MG/ML
4 INJECTION, SOLUTION INFILTRATION; PERINEURAL
Status: COMPLETED | OUTPATIENT
Start: 2025-06-27 | End: 2025-06-27

## 2025-06-27 RX ADMIN — TRIAMCINOLONE ACETONIDE 40 MG: 40 INJECTION, SUSPENSION INTRA-ARTICULAR; INTRAMUSCULAR at 14:00

## 2025-06-27 RX ADMIN — LIDOCAINE HYDROCHLORIDE 4 ML: 10 INJECTION, SOLUTION INFILTRATION; PERINEURAL at 14:00

## 2025-07-11 ENCOUNTER — TELEPHONE (OUTPATIENT)
Age: 65
End: 2025-07-11

## 2025-07-11 NOTE — TELEPHONE ENCOUNTER
Patient went to her pharmacy to  her medication and was told she needs a a prior authorization. * Patient is out of medication*    Zepbound 15 mg weekly  Dispense: 2 ml    Reason for call:   [x] Prior Auth  [] Other:     Caller:  [x] Patient  [] Pharmacy- [facility] [place] - [place], [place] - [address] [address] [address] [place] [address]       Ordering Provider:   [] PCP/Provider -   [x] Speciality/Provider - Genevieve Levine PA-C    Has the patient tried other medications and failed? If failed, which medications did they fail?    [] No   [] Yes -     Is the patient's insurance updated in EPIC?   [] Yes   [] No     Is a copy of the patient's insurance scanned in EPIC?   [] Yes   [] No

## 2025-07-14 NOTE — TELEPHONE ENCOUNTER
PA for Zepbound was SUBMITTED to [employer]    via    [x]CMM-KEY: [id number]  []Surescripts-Case ID #   []Availity-Auth ID # NDC #   []Faxed to plan   []Other website   []Phone call Case ID #     []PA sent as URGENT    All office notes, labs and other pertaining documents and studies sent. Clinical questions answered. Awaiting determination from insurance company.     Turnaround time for your insurance to make a decision on your Prior Authorization can take 7-21 business days.

## 2025-07-16 NOTE — TELEPHONE ENCOUNTER
PA for Zepbound was APPROVED     Date(s) approved 7/14/25-7/14/26    Case # [id number]     Patient advised by          [x][employer]art Message  []Phone call   []LMOM  []L/M to call office as no active Communication consent on file  []Unable to leave detailed message as VM not approved on Communication consent       Pharmacy advised by    []Fax  [x]Phone call  []Secure Chat    Specialty Pharmacy    []     Approval letter scanned into Media Yes

## 2025-08-07 ENCOUNTER — TELEPHONE (OUTPATIENT)
Age: 65
End: 2025-08-07